# Patient Record
Sex: MALE | Race: WHITE | NOT HISPANIC OR LATINO | Employment: FULL TIME | URBAN - METROPOLITAN AREA
[De-identification: names, ages, dates, MRNs, and addresses within clinical notes are randomized per-mention and may not be internally consistent; named-entity substitution may affect disease eponyms.]

---

## 2017-03-01 ENCOUNTER — OFFICE VISIT (OUTPATIENT)
Dept: OTOLARYNGOLOGY | Facility: CLINIC | Age: 47
End: 2017-03-01

## 2017-03-01 VITALS — WEIGHT: 204 LBS | HEIGHT: 71 IN | BODY MASS INDEX: 28.56 KG/M2

## 2017-03-01 DIAGNOSIS — H65.92 OTITIS MEDIA WITH EFFUSION, LEFT: Primary | ICD-10-CM

## 2017-03-01 RX ORDER — OFLOXACIN 3 MG/ML
5 SOLUTION AURICULAR (OTIC) 2 TIMES DAILY
Qty: 3 ML | Refills: 0 | Status: SHIPPED | OUTPATIENT
Start: 2017-03-01 | End: 2017-03-06

## 2017-03-01 ASSESSMENT — PAIN SCALES - GENERAL: PAINLEVEL: MODERATE PAIN (5)

## 2017-03-01 NOTE — PROGRESS NOTES
Suhail Mustafa is seen for a left ear check.  He says he's been having left face/ear/side of head pain for the last 8+ days.  He's also noticed the tinnitus has worsened in that time.  No obvious changes in hearing.  He still gets good benefit from his hearing aid and does use the alcohol/vinegar drops occasionally when his ear gets smelly from the hearing aid.    Review of systems:  He has had nasal congestion for about a month.  His entire family has been sick.  No fever.    Physical examination:  male in no acute distress.  Alert and answering questions appropriately.  HB 1/6 bilaterally.  Left ear examined under the microscope.  TM intact with the cartilage backing in position and myringosclerosis over the anterior superior portion of the TM.  There likely is an effusion that I can visualize through the anterior inferior TM and the epitympanic retraction pocket appears deeper and I cannot fully see the limits.    Assessment and plan:  Worsened eustachian tube dysfunction likely due to his recent URI.  They are flying to [place] in 2 weeks for spring break.  I recommended a myringotomy and likely PE tube placement secondary to the otitis media with effusion and eustachian tube dysfunction.  The risks and benefits were discussed.  Risks include but are not limited to:  Drainage, tympanic membrane perforation requiring future repair and need for further tube placement.  Consent was obtained.    Procedure:  Time Out was performed with confirmation of the patient, site and procedure.  Left ear examined under the microscope.  Phenol placed on the inferior quadrant.  Myringotomy incision made.  There was noted to be a thick mucoid effusion which was suctioned from the middle ear.  He had immediate improvement in hearing.  There is not much middle ear space and the TM is fairly thin so a Paparella tube was placed.  He tolerated it well with no complications.    I'd like him to use Floxin for 3 days.  He is going to make an  appointment with Audiology for an audiogram and possible hearing aid adjustment depending on the audiogram results.

## 2017-03-01 NOTE — NURSING NOTE
PREPROCEDURE: left myringotomy and PE tube  Yes Patient ID verified with 2 identifiers (name and  or MRN)  Yes: Procedure and site verified with patient/designee  Yes: Accurate consent documentation in medical record  Yes: Marking not required. Reason: Provider is in continuous attendance with the patient from consent through completion of procedure.     TIME OUT:  Yes: Time-Out performed immediately prior to starting procedure, including verbal and active participation of all team members addressing:  * Correct patient identity  * Confirmed that the correct side and site are marked  * An accurate procedure consent form  * Agreement on the procedure to be done  * Correct patient position  * Relevant images and results are properly labeled and appropriately displayed  * The need to administer antibiotics or fluids for irrigation purposes during the procedure as applicable  * Safety precations based on patient history or medication use    DURING PROCEDURE: Verification of correct person, site, and procedure occurs any time the responsibility for care of the patient is transferred to another member of the care team.

## 2017-03-01 NOTE — MR AVS SNAPSHOT
After Visit Summary   3/1/2017    Suhail Mustafa    MRN: [id number]           Patient Information     Date Of Birth          1970        Visit Information        Provider Department      3/1/2017 7:00 AM Yaneth Rogel MD [facility] Ear Nose and Throat        Today's Diagnoses     Otitis media with effusion, left    -  1      Care Instructions    You will need  to schedule a follow up appointment in 2 months for a tube check  Please schedule a hearing test for 1 weeks.  Please  the prescription for ear drops at  Your pharmacy.   Please call our clinic for any questions,concerns,or worsening symptoms.      Clinic #[phone]       Option 3  for the triage nurse          Follow-ups after your visit        Follow-up notes from your care team     Return in about 2 months (around 5/1/2017).      Your next 10 appointments already scheduled     Mar 08, 2017 11:00 AM CST   (Arrive by 10:45 AM)   Hearing Evaluation with Denisse Wang   [facility] ([facility])    [address] [place] [address] [phone]           Please see your medical professional for ear cleaning prior to this appointment if you believe wax buildup may be an issue. All patients are required to have a physician's order stating the medical reason for the hearing test. Your doctor can send an electronic order, use their own form or we have provided a form (called Physician's Order for Audiology Services). It states that there is a medical reason for your exam. Without an order you may need to be rescheduled until the order can be obtained.            May 03, 2017  7:00 AM CDT   (Arrive by 6:45 AM)   Return Visit with Yaneth Rogel MD   [facility] Ear Nose and Throat ([facility])    [address] [place] [address] [phone]              Who to contact     Please call your clinic at [phone]  "to:    Ask questions about your health    Make or cancel appointments    Discuss your medicines    Learn about your test results    Speak to your doctor   If you have compliments or concerns about an experience at your clinic, or if you wish to file a complaint, please contact [facility] Patient Relations at [phone] or email us at [email or web address]         Additional Information About Your Visit        Handmarkhart Information     Get10t gives you secure access to your electronic health record. If you see a primary care provider, you can also send messages to your care team and make appointments. If you have questions, please call your primary care clinic.  If you do not have a primary care provider, please call [phone] and they will assist you.      Fleep is an electronic gateway that provides easy, online access to your medical records. With Fleep, you can request a clinic appointment, read your test results, renew a prescription or communicate with your care team.     To access your existing account, please contact your [facility] or call [phone] for assistance.        Care EveryWhere ID     This is your Care EveryWhere ID. This could be used by other organizations to access your [facility] medical records  [id number]        Your Vitals Were     Height BMI (Body Mass Index)                1.803 m (5' 11\") 28.45 kg/m2           Blood Pressure from Last 3 Encounters:   11/17/16 103/69   11/09/16 116/79   10/31/16 (!) 128/92    Weight from Last 3 Encounters:   03/01/17 92.5 kg (204 lb)   11/17/16 92.5 kg (204 lb)   11/09/16 95.3 kg (210 lb)              We Performed the Following     BINOCULAR MICROSCOPY     TYMPANOSTOMY W LOCAL/TOPICAL ANESTH          Today's Medication Changes          These changes are accurate as of: 3/1/17  9:57 AM.  If you have any questions, ask your nurse or doctor.               Start taking these " medicines.        Dose/Directions    ofloxacin 0.3 % otic solution   Commonly known as:  FLOXIN   Used for:  Otitis media with effusion, left   Started by:  Yaneth Rogel MD        Dose:  5 drop   Place 5 drops Into the left ear 2 times daily for 5 days   Quantity:  3 mL   Refills:  0         These medicines have changed or have updated prescriptions.        Dose/Directions    triamcinolone 0.1 % ointment   Commonly known as:  KENALOG   This may have changed:    - when to take this  - reasons to take this  - additional instructions   Used for:  Other eczema        Apply sparingly to affected area three times a day   Quantity:  80 g   Refills:  2            Where to get your medicines      These medications were sent to [place] [id number] [facility] - [facility] [address] [facility]  [address] [facility] [address]     Phone:  [phone]     ofloxacin 0.3 % otic solution                Primary Care Provider Office Phone # Fax #    Bryan Purvis [phone]       [address] [address]        Thank you!     Thank you for choosing [facility] EAR NOSE AND THROAT  for your care. Our goal is always to provide you with excellent care. Hearing back from our patients is one way we can continue to improve our services. Please take a few minutes to complete the written survey that you may receive in the mail after your visit with us. Thank you!             Your Updated Medication List - Protect others around you: Learn how to safely use, store and throw away your medicines at www.disposemymeds.org.          This list is accurate as of: 3/1/17  9:57 AM.  Always use your most recent med list.                   Brand Name Dispense Instructions for use    acetaminophen 325 MG tablet    TYLENOL    100 tablet    Take 2 tablets (650 mg) by mouth every 6 hours as needed for mild pain       IRON SUPPLEMENT PO      Take 325 mg by mouth daily       ofloxacin 0.3 %  otic solution    FLOXIN    3 mL    Place 5 drops Into the left ear 2 times daily for 5 days       senna-docusate 8.6-50 MG per tablet    SENOKOT-S;PERICOLACE    50 tablet    Take 2 tablets by mouth 2 times daily as needed for constipation       triamcinolone 0.1 % ointment    KENALOG    80 g    Apply sparingly to affected area three times a day       UNABLE TO FIND      MEDICATION NAME: chamois creme       VITAMIN D (CHOLECALCIFEROL) PO      Take 1,000 Units by mouth daily

## 2017-03-01 NOTE — PATIENT INSTRUCTIONS
You will need  to schedule a follow up appointment in 2 months for a tube check  Please schedule a hearing test for 1 weeks.  Please  the prescription for ear drops at  Your pharmacy.   Please call our clinic for any questions,concerns,or worsening symptoms.      Clinic #[phone]       Option 3  for the triage nurse

## 2017-03-01 NOTE — NURSING NOTE
Chief Complaint   Patient presents with     RECHECK     6 month f/u     Nenita Barnes Medical Assistant

## 2017-03-01 NOTE — LETTER
3/1/2017      RE: Suhail Mustafa  [address]   [place] [place] [facility] [place] [address]       Suhail Mustafa is seen for a left ear check.  He says he's been having left face/ear/side of head pain for the last 8+ days.  He's also noticed the tinnitus has worsened in that time.  No obvious changes in hearing.  He still gets good benefit from his hearing aid and does use the alcohol/vinegar drops occasionally when his ear gets smelly from the hearing aid.    Review of systems:  He has had nasal congestion for about a month.  His entire family has been sick.  No fever.    Physical examination:  male in no acute distress.  Alert and answering questions appropriately.  HB 1/6 bilaterally.  Left ear examined under the microscope.  TM intact with the cartilage backing in position and myringosclerosis over the anterior superior portion of the TM.  There likely is an effusion that I can visualize through the anterior inferior TM and the epitympanic retraction pocket appears deeper and I cannot fully see the limits.    Assessment and plan:  Worsened eustachian tube dysfunction likely due to his recent URI.  They are flying to [place] in 2 weeks for spring break.  I recommended a myringotomy and likely PE tube placement secondary to the otitis media with effusion and eustachian tube dysfunction.  The risks and benefits were discussed.  Risks include but are not limited to:  Drainage, tympanic membrane perforation requiring future repair and need for further tube placement.  Consent was obtained.    Procedure:  Time Out was performed with confirmation of the patient, site and procedure.  Left ear examined under the microscope.  Phenol placed on the inferior quadrant.  Myringotomy incision made.  There was noted to be a thick mucoid effusion which was suctioned from the middle ear.  He had immediate improvement in hearing.  There is not much middle ear space and the TM is fairly thin so a Paparella tube was placed.  He tolerated it well with  no complications.    I'd like him to use Floxin for 3 days.  He is going to make an appointment with Audiology for an audiogram and possible hearing aid adjustment depending on the audiogram results.  Yaneth Rogel MD

## 2017-03-08 ENCOUNTER — OFFICE VISIT (OUTPATIENT)
Dept: AUDIOLOGY | Facility: CLINIC | Age: 47
End: 2017-03-08

## 2017-03-08 DIAGNOSIS — H93.12 TINNITUS, LEFT: ICD-10-CM

## 2017-03-08 DIAGNOSIS — H69.92 EUSTACHIAN TUBE DYSFUNCTION, LEFT: ICD-10-CM

## 2017-03-08 DIAGNOSIS — H90.72 MIXED HEARING LOSS OF LEFT EAR: Primary | ICD-10-CM

## 2017-03-08 NOTE — Clinical Note
Kamila Rogel,  This was a patient for which you recently placed a left PE tube.    He reported that things were going well, the left tymp ear canal volume seemed to be smaller than expected (only 0.3 larger than the right ear).  The ear has been draining and there was minor drainage noted in the ear canal.  We would usually refer back to ENT for the potential blocked tube but given that he just saw you, the ear is draining and the previous pain and aural fullness has gone away, I let him know I would send you a message and that your clinic would contact him if he needed to do anything.  Thanks! Amanda Connolly

## 2017-03-08 NOTE — MR AVS SNAPSHOT
After Visit Summary   3/8/2017    Suhail Mustafa    MRN: [id number]           Patient Information     Date Of Birth          1970        Visit Information        Provider Department      3/8/2017 11:00 AM Amanda Connolly AuD [facility] Audiology        Today's Diagnoses     Mixed hearing loss of left ear    -  1    Eustachian tube dysfunction, left        Tinnitus, left           Follow-ups after your visit        Your next 10 appointments already scheduled     May 03, 2017  7:00 AM CDT   (Arrive by 6:45 AM)   Return Visit with Yaneth Rogel MD   [facility] Ear Nose and Throat ([facility] [place] [facility])    [address]  [address] [facility] [address] [phone]              Who to contact     Please call your clinic at [phone] to:    Ask questions about your health    Make or cancel appointments    Discuss your medicines    Learn about your test results    Speak to your doctor   If you have compliments or concerns about an experience at your clinic, or if you wish to file a complaint, please contact [facility] Patient Relations at [phone] or email us at [email or web address]         Additional Information About Your Visit        MyChart Information     Twoodot gives you secure access to your electronic health record. If you see a primary care provider, you can also send messages to your care team and make appointments. If you have questions, please call your primary care clinic.  If you do not have a primary care provider, please call [phone] and they will assist you.      Happlink is an electronic gateway that provides easy, online access to your medical records. With Happlink, you can request a clinic appointment, read your test results, renew a prescription or communicate with your care team.     To access your existing account, please contact your [facility] or call [phone]  for assistance.        Care EveryWhere ID     This is your Care EveryWhere ID. This could be used by other organizations to access your [place] medical records  [id number]         Blood Pressure from Last 3 Encounters:   11/17/16 103/69   11/09/16 116/79   10/31/16 (!) 128/92    Weight from Last 3 Encounters:   03/01/17 92.5 kg (204 lb)   11/17/16 92.5 kg (204 lb)   11/09/16 95.3 kg (210 lb)              We Performed the Following     AUDIOGRAM/TYMPANOGRAM - INTERFACE     [facility] Audiometry Thrshld Eval & Speech Recog ([address])     No Charge, Hearing Aid Clinic Visit     Tympanometry (impedance - testing) ([address])          Today's Medication Changes          These changes are accurate as of: 3/8/17  4:12 PM.  If you have any questions, ask your nurse or doctor.               These medicines have changed or have updated prescriptions.        Dose/Directions    triamcinolone 0.1 % ointment   Commonly known as:  KENALOG   This may have changed:    - when to take this  - reasons to take this  - additional instructions   Used for:  Other eczema        Apply sparingly to affected area three times a day   Quantity:  80 g   Refills:  2                Primary Care Provider Office Phone # Fax #    Bryan Purvis [phone]       [address] [address]        Thank you!     Thank you for choosing [facility] AUDIOLOGY  for your care. Our goal is always to provide you with excellent care. Hearing back from our patients is one way we can continue to improve our services. Please take a few minutes to complete the written survey that you may receive in the mail after your visit with us. Thank you!             Your Updated Medication List - Protect others around you: Learn how to safely use, store and throw away your medicines at www.disposemymeds.org.          This list is accurate as of: 3/8/17  4:12 PM.  Always use your most recent med list.                   Brand  Name Dispense Instructions for use    acetaminophen 325 MG tablet    TYLENOL    100 tablet    Take 2 tablets (650 mg) by mouth every 6 hours as needed for mild pain       IRON SUPPLEMENT PO      Take 325 mg by mouth daily       senna-docusate 8.6-50 MG per tablet    SENOKOT-S;PERICOLACE    50 tablet    Take 2 tablets by mouth 2 times daily as needed for constipation       triamcinolone 0.1 % ointment    KENALOG    80 g    Apply sparingly to affected area three times a day       UNABLE TO FIND      MEDICATION NAME: chamois creme       VITAMIN D (CHOLECALCIFEROL) PO      Take 1,000 Units by mouth daily

## 2017-03-08 NOTE — PROGRESS NOTES
AUDIOLOGY REPORT    SUBJECTIVE:  Suhail Mustafa is a 46 year old male who was seen in the Audiology Clinic at the [facility] [place]  for audiologic evaluation, referred by Yaneth Rogel.  The patient has been seen previously in this clinic on 6/2/2016 for assessment and results indicated normal hearing in the right ear and a severe rising to an essentially moderate mixed hearing loss in the left ear.  He has a history of left ear surgeries including a tympanoplasty and PORP with reconstruction in Oct 2015.  He wears a left Phonak Bolero hearing aid which he finds beneficial. The patient reports that he was recently seen by Dr. Rogel for the placement of a left PE tube and that hearing improved following PE tube placement.  He is here today to see if hearing changed and to have his hearing aid adjusted as he reports it is too loud since PE tube placement.  He reports left ear drainage and tinnitus. The patient denies dizziness, bilateral otalgia, and bilateral aural fullness.      OBJECTIVE:  Otoscopic exam indicates clear ear canal right and PE tube visualized with minor drainage left ear.     Pure Tone Thresholds assessed using conventional audiometry with good reliability from 250-8000 Hz bilaterally using insert earphones and circumaural headphones     RIGHT:  Essentially normal hearing with a mild sensorineural hearing loss at 6 kHz (stable re: 6/2/16)    LEFT:    Moderately-severe rising to essentially moderate mixed hearing loss (improvements noted at 250 Hz and 1 kHz re: 6/2/16).    Tympanogram:    RIGHT: Hypermobile    LEFT:   Flat,  Ear canal volume is larger than right ear but smaller than expected for patent PE tube    Reflexes (reported by stimulus ear):  Did not test due to right hypermobility and left PE tube    Speech Reception Threshold:    RIGHT: 15 dB HL    LEFT:   45 dB HL    Word Recognition Score:     RIGHT: 100% at 60 dB HL using NU-6 recorded word list.    LEFT:   100% at 85  dB HL using NU-6 recorded word list.      The left hearing aid was connected to the computer and real-ear measurements were made off of the new hearing evaluation. Gain for mid-range frequencies was significantly decreased and high frequency gain was slightly increased to better match new targets. Suhail was happy with the sound quality and overall volume of the hearing aid following adjustments.  Cerumen was noted in the tube and tone hook so a new tube and tone-hook were placed. A listening check revealed that the hearing aid was sounding crisp and clear with no distortion or weakness noted.     ASSESSMENT:   Compared to patient's previous audiogram dated 6/2/2016, hearing has remained stable in the right ear and improved at 250 and 1000 Hz in the left ear. Today s results were discussed with the patient in detail.     Adjustments were made to the hearing aid as noted above.    PLAN:  It is recommended that the patient have his hearing evaluated as medically indicated or sooner if concerns arise.  He will return for hearing aid checks and programming as needed, it was discussed that hearing aids should be checked 1-2 times per year to ensure they are working appropriately.  Please call this clinic with questions regarding these results or recommendations.        Amanda Connolly, Denisse  Audiologist  [place] License  #[id number]        CC: Dr. Yaneth Rogel

## 2017-03-09 ENCOUNTER — CARE COORDINATION (OUTPATIENT)
Dept: OTOLARYNGOLOGY | Facility: CLINIC | Age: 47
End: 2017-03-09

## 2017-03-09 DIAGNOSIS — H92.10 OTORRHEA: Primary | ICD-10-CM

## 2017-03-09 RX ORDER — HYDROCORTISONE AND ACETIC ACID 20.75; 10.375 MG/ML; MG/ML
SOLUTION AURICULAR (OTIC)
Qty: 4 ML | Refills: 1 | Status: SHIPPED | OUTPATIENT
Start: 2017-03-09 | End: 2017-04-06

## 2017-03-09 NOTE — PROGRESS NOTES
Rosalia Crowell.  Would you call Suhail and ask him how much his ear seems to be draining?  If it's quite a bit, I should see him back and get a culture.  If it's a little, then start VosolHC for a week.  Thanks.     Yaneth     3-9-17 spoke with pt regarding above message- pt states had a lot of drainage 10 days ago now better.  Rx sent to pharmacy- pt notified and understood.----Rosalia Carlson RN  ENT Care Coordinator   Otology  [phone]

## 2017-03-31 ENCOUNTER — TELEPHONE (OUTPATIENT)
Dept: FAMILY MEDICINE | Facility: CLINIC | Age: 47
End: 2017-03-31

## 2017-03-31 NOTE — TELEPHONE ENCOUNTER
Patient is scheduled for 4/6/17 through [employer] for blood in stool. Please triage.  Gaby Segura CMA

## 2017-03-31 NOTE — TELEPHONE ENCOUNTER
Attempted to call patient at home number, left message on answering service requesting call back to clinic to discuss.  I also tried the mobile number listed as well, no answer, left message requesting call back to RN line on this line as well.  Leila David, RN  [facility]

## 2017-03-31 NOTE — TELEPHONE ENCOUNTER
Suhail Mustafa (Self) [phone] (W)         Patient called back.  Please call at the above number.    Melanie Pena RN [facility] Triage.

## 2017-03-31 NOTE — TELEPHONE ENCOUNTER
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  It seemed to resolve but is noticing blood in his stool when he has BM, no large clots, denies abdominal pain, dizziness, fainting.   Does not fill toilet or bleed without stooling.   States it began happening again a month ago and he wonders what else he can do.  Advised him to keep appointment, seek eval in ER if having larger amounts of blood, abdominal pain, or dizziness.  Patient verbalized understanding of and agreement with plan.  Source:  Telephone Triage Protocols for Nurses, Steven, 5th ed, stools, abnormal  Leila David, CAROLYNN  [facility]

## 2017-04-06 ENCOUNTER — OFFICE VISIT (OUTPATIENT)
Dept: FAMILY MEDICINE | Facility: CLINIC | Age: 47
End: 2017-04-06
Payer: COMMERCIAL

## 2017-04-06 VITALS
HEART RATE: 80 BPM | DIASTOLIC BLOOD PRESSURE: 74 MMHG | SYSTOLIC BLOOD PRESSURE: 120 MMHG | TEMPERATURE: 97.5 F | OXYGEN SATURATION: 95 % | WEIGHT: 210 LBS | BODY MASS INDEX: 29.29 KG/M2

## 2017-04-06 DIAGNOSIS — H90.12 CONDUCTIVE HEARING LOSS IN LEFT EAR: ICD-10-CM

## 2017-04-06 DIAGNOSIS — K62.5 RECTAL BLEEDING: Primary | ICD-10-CM

## 2017-04-06 DIAGNOSIS — K64.8 INTERNAL HEMORRHOIDS: ICD-10-CM

## 2017-04-06 DIAGNOSIS — Z52.4 DONOR OF KIDNEY FOR TRANSPLANT: ICD-10-CM

## 2017-04-06 PROCEDURE — 99213 OFFICE O/P EST LOW 20 MIN: CPT | Mod: 25 | Performed by: FAMILY MEDICINE

## 2017-04-06 PROCEDURE — 46600 DIAGNOSTIC ANOSCOPY SPX: CPT | Performed by: FAMILY MEDICINE

## 2017-04-06 NOTE — MR AVS SNAPSHOT
After Visit Summary   4/6/2017    Suhail Mustafa    MRN: [id number]           Patient Information     Date Of Birth          1970        Visit Information        Provider Department      4/6/2017 3:40 PM To Whitt MD [facility]        Today's Diagnoses     Rectal bleeding    -  1    Internal hemorrhoids        Donor of kidney for transplant        Conductive hearing loss in left ear           Follow-ups after your visit        Follow-up notes from your care team     Return if symptoms worsen or fail to improve.      Your next 10 appointments already scheduled     May 03, 2017  7:00 AM CDT   (Arrive by 6:45 AM)   Return Visit with Yaneth Rogel MD   [facility] ([facility])    [address]  [address] [facility] [address] [phone]              Who to contact     If you have questions or need follow up information about today's clinic visit or your schedule please contact [facility] directly at [phone].  Normal or non-critical lab and imaging results will be communicated to you by Medivancehart, letter or phone within 4 business days after the clinic has received the results. If you do not hear from us within 7 days, please contact the clinic through Medivancehart or phone. If you have a critical or abnormal lab result, we will notify you by phone as soon as possible.  Submit refill requests through Yolto or call your pharmacy and they will forward the refill request to us. Please allow 3 business days for your refill to be completed.          Additional Information About Your Visit        MyChart Information     Yolto gives you secure access to your electronic health record. If you see a primary care provider, you can also send messages to your care team and make appointments. If you have questions, please call your primary care clinic.  If you do not have a primary care provider, please  call [phone] and they will assist you.        Care EveryWhere ID     This is your Care EveryWhere ID. This could be used by other organizations to access your [place] medical records  [id number]        Your Vitals Were     Pulse Temperature Pulse Oximetry BMI (Body Mass Index)          80 97.5  F (36.4  C) (Oral) 95% 29.29 kg/m2         Blood Pressure from Last 3 Encounters:   04/06/17 120/74   11/17/16 103/69   11/09/16 116/79    Weight from Last 3 Encounters:   04/06/17 210 lb (95.3 kg)   03/01/17 204 lb (92.5 kg)   11/17/16 204 lb (92.5 kg)              Today, you had the following     No orders found for display         Today's Medication Changes          These changes are accurate as of: 4/6/17  4:09 PM.  If you have any questions, ask your nurse or doctor.               These medicines have changed or have updated prescriptions.        Dose/Directions    triamcinolone 0.1 % ointment   Commonly known as:  KENALOG   This may have changed:    - when to take this  - reasons to take this  - additional instructions   Used for:  Other eczema        Apply sparingly to affected area three times a day   Quantity:  80 g   Refills:  2                Primary Care Provider Office Phone # Fax #    Bryan Purvis [phone]       [address] [address]        Thank you!     Thank you for choosing [facility]  for your care. Our goal is always to provide you with excellent care. Hearing back from our patients is one way we can continue to improve our services. Please take a few minutes to complete the written survey that you may receive in the mail after your visit with us. Thank you!             Your Updated Medication List - Protect others around you: Learn how to safely use, store and throw away your medicines at www.disposemymeds.org.          This list is accurate as of: 4/6/17  4:09 PM.  Always use your most recent med  list.                   Brand Name Dispense Instructions for use    acetaminophen 325 MG tablet    TYLENOL    100 tablet    Take 2 tablets (650 mg) by mouth every 6 hours as needed for mild pain       IRON SUPPLEMENT PO      Take 325 mg by mouth daily       triamcinolone 0.1 % ointment    KENALOG    80 g    Apply sparingly to affected area three times a day       UNABLE TO FIND      MEDICATION NAME: harriett creme       VITAMIN D (CHOLECALCIFEROL) PO      Take 1,000 Units by mouth daily

## 2017-04-06 NOTE — PROGRESS NOTES
SUBJECTIVE:                                                    Suhail Mustafa is a 46 year old male who presents to clinic today for the following health issues:      New Patient/Transfer of Care. He had been seeing Dr. Purvis, but Dr. Purvis is retired now.    Concern - Blood in the stool     Onset: 4-5 months    Description:   Blood in the stool, has been using cream for hemorrhoids but none on the outside. Possibly still some on the inside.    Intensity: moderate, severe    Progression of Symptoms:  improving and worsening    Accompanying Signs & Symptoms:  Blood in stool, discomfort       Previous history of similar problem:   A year ago    Precipitating factors:   Worsened by: None    Alleviating factors:  Improved by: better diet, cream on the outside       Therapies Tried and outcome: None      He previously has had external hemorrhoids, but he hasn't noticed any lumps or growths on the outside of his rectum lately. The bleeding is on the outside of his stool and on the tissue. No significant pain with the passage of bowel movements.  He does have a tendency towards constipation. He sits around a lot during his workday. He also tends to sit for a prolonged period when having a bowel movement.  No family history of colon cancer.    He did donate a kidney about 6 months ago to his brother. He's had some follow-up lab work related to that and then dissipates having lab work done soon for that. His creatinine was 1.55 when checked this last fall. His brother's creatinine came down to about 2.1 after the transplant.    Problem list and histories reviewed & adjusted, as indicated.  Additional history: as documented    Patient Active Problem List   Diagnosis     CARDIOVASCULAR SCREENING; LDL GOAL LESS THAN 160     FH: kidney cancer     Conductive hearing loss in left ear     Donor of kidney for transplant     Lichen simplex chronicus     Other eczema     ACP (advance care planning)     Past Surgical History:    Procedure Laterality Date     ENT SURGERY  multiple times    pe tubes     ENT SURGERY      tonsils     ENT SURGERY  11/2011    Tubes 5 times since last time 10/21/14     GENITOURINARY SURGERY  Oct. 2013    Vasectomy     LAPAROSCOPIC DONOR HAND ASSISTED KIDNEY LIVING RELATED N/A 10/13/2016    Procedure: LAPAROSCOPIC DONOR HAND ASSISTED KIDNEY LIVING RELATED;  Surgeon: Tigre Quarles MD;  Location: [facility] OR     LASER DIODE TYMPANOMASTOIDECTOMY Left 3/31/2015    Procedure: LASER DIODE TYMPANOMASTOIDECTOMY;  Surgeon: Yaneth Rogel MD;  Location: [facility] OR     ORTHOPEDIC SURGERY  child    dislocated hip     OSSICULAR CHAIN RECONSTRUCTION N/A 10/9/2015    Procedure: OSSICULAR CHAIN RECONSTRUCTION;  Surgeon: Yaneth Rogel MD;  Location: [facility] OR     TRANSPLANT       TYMPANOPLASTY Left 10/9/2015    Procedure: TYMPANOPLASTY;  Surgeon: Yaneth Rogel MD;  Location:  OR       Social History   Substance Use Topics     Smoking status: Never Smoker     Smokeless tobacco: Never Used     Alcohol use 0.6 oz/week     1 Standard drinks or equivalent per week      Comment: once per week with a meal     Family History   Problem Relation Age of Onset     GASTROINTESTINAL DISEASE Mother      colostomy for diverticulitis     CEREBROVASCULAR DISEASE Mother      after giving birth     KIDNEY DISEASE Brother      dysplasia     Genitourinary Problems Brother      Cardiovascular Brother      ablation     HEART DISEASE Father      Genitourinary Problems Father      Type 2 Diabetes Father      obese     CANCER Paternal Grandfather      bone cancer     Aneurysm Paternal Uncle      brain aneurysm on coumadin     Hypertension Brother          Current Outpatient Prescriptions   Medication Sig Dispense Refill     UNABLE TO FIND MEDICATION NAME: chamois creme       acetaminophen (TYLENOL) 325 MG tablet Take 2 tablets (650 mg) by mouth every 6 hours as needed for mild pain 100 tablet 0     triamcinolone (KENALOG) 0.1 % ointment Apply sparingly to  affected area three times a day (Patient taking differently: 3 times daily as needed Apply sparingly to affected area three times a day) 80 g 2     VITAMIN D, CHOLECALCIFEROL, PO Take 1,000 Units by mouth daily        Ferrous Sulfate (IRON SUPPLEMENT PO) Take 325 mg by mouth daily       No Known Allergies    Reviewed and updated as needed this visit by clinical staff  Tobacco  Allergies  Meds  Med Hx  Surg Hx  Fam Hx  Soc Hx      Reviewed and updated as needed this visit by Provider         ROS:  He's had some left shoulder discomfort, but he is doing exercises for that now and it seems to be getting better.  He also describes a history of ongoing left ear problems. He has a tube in his left eardrum now and does use a hearing aid.    OBJECTIVE:                                                    /74 (BP Location: Right arm, Patient Position: Chair, Cuff Size: Adult Regular)  Pulse 80  Temp 97.5  F (36.4  C) (Oral)  Wt 210 lb (95.3 kg)  SpO2 95%  BMI 29.29 kg/m2  Body mass index is 29.29 kg/(m^2).  GENERAL: healthy, alert and no distress  RECTAL (male): No external hemorrhoids or fissures are seen. Anoscopy was performed and he does have internal hemorrhoids present, especially at the 9:00 position. No active bleeding seen.    Diagnostic Test Results:  Previous lab results and office notes were reviewed in his chart      ASSESSMENT/PLAN:                                                        ICD-10-CM    1. Rectal bleeding K62.5 ANOSCOPY W/WO BRUSH/WASH   2. Internal hemorrhoids K64.8 ANOSCOPY W/WO BRUSH/WASH   3. Donor of kidney for transplant Z52.4    4. Conductive hearing loss in left ear H90.12      His rectal bleeding appears secondary to internal hemorrhoids  I reviewed conservative treatment for that including increased fiber and fluids to keep the bowel movements soft and regular and easily passed  I advised him to avoid prolonged sitting, especially when having bowel movements  We will follow  this expectantly  If symptoms persist over the next month or 2, then I discussed possibly proceeding with a colonoscopy as he will be due for one next year in August after turning 48 anyway  He'll continue with routine follow-up for his history of kidney donor status and ear problems    If new, worsening or persistent symptoms, the patient is to call or return for a recheck.      To Whitt MD  [facility]

## 2017-04-06 NOTE — NURSING NOTE
"Chief Complaint   Patient presents with     Establish Care     Rectal Problem     blood in stool       Initial /74 (BP Location: Right arm, Patient Position: Chair, Cuff Size: Adult Regular)  Pulse 80  Temp 97.5  F (36.4  C) (Oral)  Wt 210 lb (95.3 kg)  SpO2 95%  BMI 29.29 kg/m2 Estimated body mass index is 29.29 kg/(m^2) as calculated from the following:    Height as of 3/1/17: 5' 11\" (1.803 m).    Weight as of this encounter: 210 lb (95.3 kg).  Medication Reconciliation: complete   Shira See DENICE Vazquez      "

## 2017-04-19 ENCOUNTER — OFFICE VISIT (OUTPATIENT)
Dept: OTOLARYNGOLOGY | Facility: CLINIC | Age: 47
End: 2017-04-19

## 2017-04-19 VITALS — HEIGHT: 71 IN | WEIGHT: 210 LBS | BODY MASS INDEX: 29.4 KG/M2

## 2017-04-19 DIAGNOSIS — H69.92 EUSTACHIAN TUBE DISORDER, LEFT: Primary | ICD-10-CM

## 2017-04-19 RX ORDER — ACETIC ACID 20.65 MG/ML
SOLUTION AURICULAR (OTIC)
Qty: 10 ML | Refills: 11 | Status: SHIPPED | OUTPATIENT
Start: 2017-04-19 | End: 2023-07-17

## 2017-04-19 ASSESSMENT — PAIN SCALES - GENERAL: PAINLEVEL: NO PAIN (0)

## 2017-04-19 NOTE — MR AVS SNAPSHOT
After Visit Summary   4/19/2017    Suhail Mustafa    MRN: [id number]           Patient Information     Date Of Birth          1970        Visit Information        Provider Department      4/19/2017 7:00 AM Yaneth Rogel MD [facility]        Today's Diagnoses     Eustachian tube disorder, left    -  1      Care Instructions    You will need  to schedule a follow up appointment in 6 months.  Please  the prescription for Vosol ear drops, use every day to every other day.   Please call our clinic for any questions,concerns,or worsening symptoms.      Clinic #[phone]       Option 3  for the triage nurse.        Follow-ups after your visit        Follow-up notes from your care team     Return in about 6 months (around 10/19/2017).      Your next 10 appointments already scheduled     Oct 18, 2017  7:00 AM CDT   (Arrive by 6:45 AM)   Return Visit with MD DHIRAJ Larry [facility] ([facility])    [address] [place] [address] [phone]              Who to contact     Please call your clinic at [phone] to:    Ask questions about your health    Make or cancel appointments    Discuss your medicines    Learn about your test results    Speak to your doctor   If you have compliments or concerns about an experience at your clinic, or if you wish to file a complaint, please contact [facility] Patient Relations at [phone] or email us at [email or web address]         Additional Information About Your Visit        MyChart Information     OndaViat gives you secure access to your electronic health record. If you see a primary care provider, you can also send messages to your care team and make appointments. If you have questions, please call your primary care clinic.  If you do not have a primary care provider, please call [phone] and they will assist you.       "ATG Access is an electronic gateway that provides easy, online access to your medical records. With ATG Access, you can request a clinic appointment, read your test results, renew a prescription or communicate with your care team.     To access your existing account, please contact your [facility] or call [phone] for assistance.        Care EveryWhere ID     This is your Care EveryWhere ID. This could be used by other organizations to access your [place] medical records  [id number]        Your Vitals Were     Height BMI (Body Mass Index)                1.803 m (5' 10.98\") 29.3 kg/m2           Blood Pressure from Last 3 Encounters:   04/06/17 120/74   11/17/16 103/69   11/09/16 116/79    Weight from Last 3 Encounters:   04/19/17 95.3 kg (210 lb)   04/06/17 95.3 kg (210 lb)   03/01/17 92.5 kg (204 lb)              We Performed the Following     BINOCULAR MICROSCOPY          Today's Medication Changes          These changes are accurate as of: 4/19/17  7:22 AM.  If you have any questions, ask your nurse or doctor.               Start taking these medicines.        Dose/Directions    acetic acid 2 % otic solution   Commonly known as:  VOSOL   Used for:  Eustachian tube disorder, left   Started by:  Yaneth Rogel MD        4 drops to the left ear- daily or every other day   Quantity:  10 mL   Refills:  11         These medicines have changed or have updated prescriptions.        Dose/Directions    triamcinolone 0.1 % ointment   Commonly known as:  KENALOG   This may have changed:    - when to take this  - reasons to take this  - additional instructions   Used for:  Other eczema        Apply sparingly to affected area three times a day   Quantity:  80 g   Refills:  2            Where to get your medicines      These medications were sent to [place] [address] [facility] - [place], [place] - [address] [facility]  [address] [address]     Phone:  [phone]     acetic acid 2 % otic " solution                Primary Care Provider Office Phone # Fax #    To Whitt -[phone]       [facility] [address] [facility] [place] [address]        Thank you!     Thank you for choosing [facility]  for your care. Our goal is always to provide you with excellent care. Hearing back from our patients is one way we can continue to improve our services. Please take a few minutes to complete the written survey that you may receive in the mail after your visit with us. Thank you!             Your Updated Medication List - Protect others around you: Learn how to safely use, store and throw away your medicines at www.disposemymeds.org.          This list is accurate as of: 4/19/17  7:22 AM.  Always use your most recent med list.                   Brand Name Dispense Instructions for use    acetaminophen 325 MG tablet    TYLENOL    100 tablet    Take 2 tablets (650 mg) by mouth every 6 hours as needed for mild pain       acetic acid 2 % otic solution    VOSOL    10 mL    4 drops to the left ear- daily or every other day       IRON SUPPLEMENT PO      Take 325 mg by mouth daily       triamcinolone 0.1 % ointment    KENALOG    80 g    Apply sparingly to affected area three times a day       UNABLE TO FIND      MEDICATION NAME: chamois creme       VITAMIN D (CHOLECALCIFEROL) PO      Take 1,000 Units by mouth daily

## 2017-04-19 NOTE — PATIENT INSTRUCTIONS
You will need  to schedule a follow up appointment in 6 months.  Please  the prescription for Vosol ear drops, use every day to every other day.   Please call our clinic for any questions,concerns,or worsening symptoms.      Clinic #[phone]       Option 3  for the triage nurse.

## 2017-04-19 NOTE — LETTER
4/19/2017      RE: Suhail Hookerstrom  [address] [place] [address]       Suhail Mustafa is seen for a left PE tube check.  He says his ear is better and the PE tube made flying easy.  The ear was dry for a while but he thinks it was blocked as his ear was full again.  He started the Floxin again and now the ear is moist but not actively draining.  The tube is definitely open as he can taste the drops.  No pain.  Wearing his hearing aid with no difficulty.  He says that he actually had a little trouble equalizing the right ear on the plane.  No hearing changes or fullness on the right.    Physical examination:  male in no acute distress.  Alert and answering questions appropriately.  HB 1/6 bilaterally.  Both ears examined under the microscope.  Right ear canal clear, TM intact and somewhat atrophic and slightly retracted throughout but no pockets noted, no effusion.  Left ear canal moist without otorrhea, PE tube in position in the inferior quadrant and open, TM thick which is unchanged.  The ear was suctioned and no fluid came out of the tube.    Assessment and plan:  Open PE tube.  We discussed that his ear may be slightly moist at baseline as he does wear a hearing aid on the left and he may be draining a little bit of effusion out of the PE tube.  I'd like him to change to Vosol drops once or even every other day to try to prevent infections and to help keep the tube open.  We'll see him again in 6 months.      Yaneth Rogel MD

## 2017-04-19 NOTE — NURSING NOTE
Chief Complaint   Patient presents with     RECHECK     ear check     Nenita Barnes Medical Assistant

## 2017-04-19 NOTE — PROGRESS NOTES
Suhail VIVIANA HookerRamsey is seen for a left PE tube check.  He says his ear is better and the PE tube made flying easy.  The ear was dry for a while but he thinks it was blocked as his ear was full again.  He started the Floxin again and now the ear is moist but not actively draining.  The tube is definitely open as he can taste the drops.  No pain.  Wearing his hearing aid with no difficulty.  He says that he actually had a little trouble equalizing the right ear on the plane.  No hearing changes or fullness on the right.    Physical examination:  male in no acute distress.  Alert and answering questions appropriately.  HB 1/6 bilaterally.  Both ears examined under the microscope.  Right ear canal clear, TM intact and somewhat atrophic and slightly retracted throughout but no pockets noted, no effusion.  Left ear canal moist without otorrhea, PE tube in position in the inferior quadrant and open, TM thick which is unchanged.  The ear was suctioned and no fluid came out of the tube.    Assessment and plan:  Open PE tube.  We discussed that his ear may be slightly moist at baseline as he does wear a hearing aid on the left and he may be draining a little bit of effusion out of the PE tube.  I'd like him to change to Vosol drops once or even every other day to try to prevent infections and to help keep the tube open.  We'll see him again in 6 months.

## 2017-05-11 ENCOUNTER — OFFICE VISIT (OUTPATIENT)
Dept: AUDIOLOGY | Facility: CLINIC | Age: 47
End: 2017-05-11

## 2017-05-11 DIAGNOSIS — Z52.4 KIDNEY DONOR: ICD-10-CM

## 2017-05-11 DIAGNOSIS — H90.12 CONDUCTIVE HEARING LOSS IN LEFT EAR: Primary | ICD-10-CM

## 2017-05-11 DIAGNOSIS — Z52.4 KIDNEY DONOR: Primary | ICD-10-CM

## 2017-05-11 LAB
CREAT SERPL-MCNC: 1.4 MG/DL (ref 0.66–1.25)
CREAT UR-MCNC: 49 MG/DL
GFR SERPL CREATININE-BSD FRML MDRD: 54 ML/MIN/1.7M2
MICROALBUMIN UR-MCNC: <5 MG/L
MICROALBUMIN/CREAT UR: NORMAL MG/G CR (ref 0–17)
PROT UR-MCNC: <0.05 G/L
PROT/CREAT 24H UR: NORMAL G/G CR (ref 0–0.2)

## 2017-05-11 NOTE — PROGRESS NOTES
AUDIOLOGY REPORT    BACKGROUND INFORMATION: Suhail Mustafa was seen in Audiology at the [facility] on 5/11/2017 for follow-up.  The patient has been seen previously in this clinic on 01/7/2016 for fitting of Phonak Bolero V50-SP behind-the-ear hearing aid. Previous results have revealed normal hearing sensitivity at the right ear and a severe rising to mild mixed hearing loss at the left ear.The patient reports that following his left PE tube his hearing has improved and the hearing aid therefore is too loud.    TEST RESULTS AND PROCEDURES: Overall gain was decreased by 4 dB. I also extended the volume control range so he is able to decrease more if needed.    SUMMARY AND RECOMMENDATIONS: A hearing aid check was performed today and the patient reports he will try the new settings.  Adjustments were made as noted above and the patient will return for follow-up in4-6 months, sooner if needed.  Call this clinic with questions regarding today s visit.      Osiel De Jesus  Licensed Audiologist  [place] License #[id number]

## 2017-05-11 NOTE — MR AVS SNAPSHOT
After Visit Summary   5/11/2017    Suhail Mustafa    MRN: [id number]           Patient Information     Date Of Birth          1970        Visit Information        Provider Department      5/11/2017 8:00 AM Amanda Wright AuD M [facility]        Today's Diagnoses     Conductive hearing loss in left ear    -  1       Follow-ups after your visit        Your next 10 appointments already scheduled     Oct 18, 2017  7:00 AM CDT   (Arrive by 6:45 AM)   Return Visit with MD DHIRAJ Larry [facility] Nose and Throat [facility])    [address] [place] [address] [phone]              Who to contact     Please call your clinic at [phone] to:    Ask questions about your health    Make or cancel appointments    Discuss your medicines    Learn about your test results    Speak to your doctor   If you have compliments or concerns about an experience at your clinic, or if you wish to file a complaint, please contact [facility] Patient Relations at [phone] or email us at [email or web address]         Additional Information About Your Visit        MyChart Information     SaltStackt gives you secure access to your electronic health record. If you see a primary care provider, you can also send messages to your care team and make appointments. If you have questions, please call your primary care clinic.  If you do not have a primary care provider, please call [phone] and they will assist you.      SaltStackt is an electronic gateway that provides easy, online access to your medical records. With Flythegap, you can request a clinic appointment, read your test results, renew a prescription or communicate with your care team.     To access your existing account, please contact your [facility] or call [phone] for assistance.        Care EveryWhere ID     This is your Care  EveryWhere ID. This could be used by other organizations to access your [place] medical records  [id number]         Blood Pressure from Last 3 Encounters:   04/06/17 120/74   11/17/16 103/69   11/09/16 116/79    Weight from Last 3 Encounters:   04/19/17 95.3 kg (210 lb)   04/06/17 95.3 kg (210 lb)   03/01/17 92.5 kg (204 lb)              We Performed the Following     No Charge, Hearing Aid Clinic Visit          Today's Medication Changes          These changes are accurate as of: 5/11/17 10:38 AM.  If you have any questions, ask your nurse or doctor.               These medicines have changed or have updated prescriptions.        Dose/Directions    triamcinolone 0.1 % ointment   Commonly known as:  KENALOG   This may have changed:    - when to take this  - reasons to take this  - additional instructions   Used for:  Other eczema        Apply sparingly to affected area three times a day   Quantity:  80 g   Refills:  2                Primary Care Provider Office Phone # Fax #    To Whitt [phone]       [address] [address]        Thank you!     Thank you for choosing [facility] AUDIOLOGY  for your care. Our goal is always to provide you with excellent care. Hearing back from our patients is one way we can continue to improve our services. Please take a few minutes to complete the written survey that you may receive in the mail after your visit with us. Thank you!             Your Updated Medication List - Protect others around you: Learn how to safely use, store and throw away your medicines at www.disposemymeds.org.          This list is accurate as of: 5/11/17 10:38 AM.  Always use your most recent med list.                   Brand Name Dispense Instructions for use    acetaminophen 325 MG tablet    TYLENOL    100 tablet    Take 2 tablets (650 mg) by mouth every 6 hours as needed for mild pain       IRON SUPPLEMENT PO      Take 325 mg  by mouth daily       triamcinolone 0.1 % ointment    KENALOG    80 g    Apply sparingly to affected area three times a day       UNABLE TO FIND      MEDICATION NAME: chamois creme       VITAMIN D (CHOLECALCIFEROL) PO      Take 1,000 Units by mouth daily

## 2017-05-12 DIAGNOSIS — Z52.4 KIDNEY DONOR: ICD-10-CM

## 2017-05-12 LAB
ALBUMIN UR-MCNC: NEGATIVE MG/DL
APPEARANCE UR: CLEAR
BILIRUB UR QL STRIP: NEGATIVE
COLOR UR AUTO: ABNORMAL
GLUCOSE UR STRIP-MCNC: NEGATIVE MG/DL
HGB UR QL STRIP: NEGATIVE
KETONES UR STRIP-MCNC: NEGATIVE MG/DL
LEUKOCYTE ESTERASE UR QL STRIP: NEGATIVE
MUCOUS THREADS #/AREA URNS LPF: PRESENT /LPF
NITRATE UR QL: NEGATIVE
PH UR STRIP: 6 PH (ref 5–7)
RBC #/AREA URNS AUTO: 0 /HPF (ref 0–2)
SP GR UR STRIP: 1.01 (ref 1–1.03)
SQUAMOUS #/AREA URNS AUTO: <1 /HPF (ref 0–1)
URN SPEC COLLECT METH UR: ABNORMAL
UROBILINOGEN UR STRIP-MCNC: 0 MG/DL (ref 0–2)
WBC #/AREA URNS AUTO: 0 /HPF (ref 0–2)

## 2017-05-15 ENCOUNTER — MYC MEDICAL ADVICE (OUTPATIENT)
Dept: FAMILY MEDICINE | Facility: CLINIC | Age: 47
End: 2017-05-15

## 2017-05-15 NOTE — TELEPHONE ENCOUNTER
Please see MyChart message below.  Cr and Albumin both show too low to calculate.    Routed to PCP to advise.    Suzette Rice RN  [facility]

## 2017-06-07 ENCOUNTER — OFFICE VISIT (OUTPATIENT)
Dept: FAMILY MEDICINE | Facility: CLINIC | Age: 47
End: 2017-06-07
Payer: COMMERCIAL

## 2017-06-07 VITALS
HEART RATE: 74 BPM | BODY MASS INDEX: 28.6 KG/M2 | TEMPERATURE: 99 F | DIASTOLIC BLOOD PRESSURE: 70 MMHG | SYSTOLIC BLOOD PRESSURE: 107 MMHG | OXYGEN SATURATION: 96 % | WEIGHT: 205 LBS

## 2017-06-07 DIAGNOSIS — M75.42 IMPINGEMENT SYNDROME OF LEFT SHOULDER: Primary | ICD-10-CM

## 2017-06-07 PROCEDURE — 99213 OFFICE O/P EST LOW 20 MIN: CPT | Performed by: FAMILY MEDICINE

## 2017-06-07 ASSESSMENT — PAIN SCALES - GENERAL: PAINLEVEL: MODERATE PAIN (5)

## 2017-06-07 NOTE — MR AVS SNAPSHOT
After Visit Summary   6/7/2017    Suhail Mustafa    MRN: [id number]           Patient Information     Date Of Birth          1970        Visit Information        Provider Department      6/7/2017 6:00 PM To Whitt MD [facility]        Today's Diagnoses     Impingement syndrome of left shoulder    -  1       Follow-ups after your visit        Additional Services     JENNIFER PT, HAND, AND CHIROPRACTIC REFERRAL       **This order will print in the [facility] Scheduling Office**    Physical Therapy, Hand Therapy and Chiropractic Care are available through:    *[facility]  *[facility]  *[facility]    Call one number to schedule at any of the above locations: [phone].    Your provider has referred you to: Physical Therapy at [facility] or [facility]    Indication/Reason for Referral: Shoulder Pain  Onset of Illness: 2 months ago  Therapy Orders: Evaluate and Treat  Special Programs: None  Special Request: None    Veronika Banda      Additional Comments for the Therapist or Chiropractor:     Please be aware that coverage of these services is subject to the terms and limitations of your health insurance plan.  Call member services at your health plan with any benefit or coverage questions.      Please bring the following to your appointment:    *Your personal calendar for scheduling future appointments  *Comfortable clothing                  Follow-up notes from your care team     Return if symptoms worsen or fail to improve.      Your next 10 appointments already scheduled     Oct 18, 2017  7:00 AM CDT   (Arrive by 6:45 AM)   Return Visit with Yaneth Rogel MD   [facility] ([facility])    [address] [place] [address] [phone]              Who to contact     If you have questions or need follow up information about today's clinic visit or your schedule please  contact [facility] directly at [phone].  Normal or non-critical lab and imaging results will be communicated to you by MyChart, letter or phone within 4 business days after the clinic has received the results. If you do not hear from us within 7 days, please contact the clinic through SpongeFishhart or phone. If you have a critical or abnormal lab result, we will notify you by phone as soon as possible.  Submit refill requests through [email or web address] or call your pharmacy and they will forward the refill request to us. Please allow 3 business days for your refill to be completed.          Additional Information About Your Visit        SpongeFishharPhysioSonics Information     [email or web address] gives you secure access to your electronic health record. If you see a primary care provider, you can also send messages to your care team and make appointments. If you have questions, please call your primary care clinic.  If you do not have a primary care provider, please call [phone] and they will assist you.        Care EveryWhere ID     This is your Care EveryWhere ID. This could be used by other organizations to access your [place] medical records  [id number]        Your Vitals Were     Pulse Temperature Pulse Oximetry BMI (Body Mass Index)          74 99  F (37.2  C) (Oral) 96% 28.6 kg/m2         Blood Pressure from Last 3 Encounters:   06/07/17 107/70   04/06/17 120/74   11/17/16 103/69    Weight from Last 3 Encounters:   06/07/17 205 lb (93 kg)   04/19/17 210 lb (95.3 kg)   04/06/17 210 lb (95.3 kg)              We Performed the Following     EJNNIFER PT, HAND, AND CHIROPRACTIC REFERRAL          Today's Medication Changes          These changes are accurate as of: 6/7/17  6:21 PM.  If you have any questions, ask your nurse or doctor.               These medicines have changed or have updated prescriptions.        Dose/Directions    triamcinolone 0.1 % ointment   Commonly known as:  KENALOG   This may have changed:    - when to  take this  - reasons to take this  - additional instructions   Used for:  Other eczema        Apply sparingly to affected area three times a day   Quantity:  80 g   Refills:  2                Primary Care Provider Office Phone # Fax #    To Whitt [phone]       [facility] [address]  [place]. \[facility].\"" [address]        Thank you!     Thank you for choosing [facility]  for your care. Our goal is always to provide you with excellent care. Hearing back from our patients is one way we can continue to improve our services. Please take a few minutes to complete the written survey that you may receive in the mail after your visit with us. Thank you!             Your Updated Medication List - Protect others around you: Learn how to safely use, store and throw away your medicines at www.disposemymeds.org.          This list is accurate as of: 6/7/17  6:21 PM.  Always use your most recent med list.                   Brand Name Dispense Instructions for use    acetaminophen 325 MG tablet    TYLENOL    100 tablet    Take 2 tablets (650 mg) by mouth every 6 hours as needed for mild pain       IRON SUPPLEMENT PO      Take 325 mg by mouth daily       triamcinolone 0.1 % ointment    KENALOG    80 g    Apply sparingly to affected area three times a day       VITAMIN D (CHOLECALCIFEROL) PO      Take 1,000 Units by mouth daily

## 2017-06-07 NOTE — NURSING NOTE
"Chief Complaint   Patient presents with     Shoulder Pain     Left shoulder, Pain has not decreased in the past month. Working out is aggravating the shoulder       Initial /70 (BP Location: Right arm, Patient Position: Chair, Cuff Size: Adult Large)  Pulse 74  Temp 99  F (37.2  C) (Oral)  Wt 205 lb (93 kg)  SpO2 96%  BMI 28.6 kg/m2 Estimated body mass index is 28.6 kg/(m^2) as calculated from the following:    Height as of 4/19/17: 5' 10.98\" (1.803 m).    Weight as of this encounter: 205 lb (93 kg).  Medication Reconciliation: complete   Gaby Segura CMA       "

## 2017-06-07 NOTE — PROGRESS NOTES
SUBJECTIVE:                                                    Suhail Mustafa is a 46 year old male who presents to clinic today for the following health issues:      Joint Pain     Onset: 2 months    Description:   Location: left shoulder  Character: Sharp    Intensity: 5/10    Progression of Symptoms: worse    Accompanying Signs & Symptoms:  Other symptoms: none   History:     Previous similar pain: YES  2 years ago    Precipitating factors:   Trauma or overuse: YES    Alleviating factors:  Improved by: heat and acetaminophen       Therapies Tried and outcome: Tylenol, Heat      He's had no specific injury to the left shoulder. He does a lot of general weight lifting and other exercising, some of which seems to bother his left shoulder. He has pain when raising the left arm up overhead.  He had something similar to this a couple of years ago when he was kickboxing and doing other activities. He rested it for a while and it got better.    Problem list and histories reviewed & adjusted, as indicated.  Additional history: as documented    Patient Active Problem List   Diagnosis     CARDIOVASCULAR SCREENING; LDL GOAL LESS THAN 160     FH: kidney cancer     Conductive hearing loss in left ear     Donor of kidney for transplant     Lichen simplex chronicus     Other eczema     ACP (advance care planning)     Past Surgical History:   Procedure Laterality Date     ENT SURGERY  multiple times    pe tubes     ENT SURGERY      tonsils     ENT SURGERY  11/2011    Tubes 5 times since last time 10/21/14     GENITOURINARY SURGERY  Oct. 2013    Vasectomy     LAPAROSCOPIC DONOR HAND ASSISTED KIDNEY LIVING RELATED N/A 10/13/2016    Procedure: LAPAROSCOPIC DONOR HAND ASSISTED KIDNEY LIVING RELATED;  Surgeon: Tigre Quarles MD;  [facility]     LASER DIODE TYMPANOMASTOIDECTOMY Left 3/31/2015    Procedure: LASER DIODE TYMPANOMASTOIDECTOMY;  Surgeon: Yaneth Rogel MD;  [facility]     ORTHOPEDIC SURGERY  child     dislocated hip     OSSICULAR CHAIN RECONSTRUCTION N/A 10/9/2015    Procedure: OSSICULAR CHAIN RECONSTRUCTION;  Surgeon: Yaneth Rogel MD;  [facility]     TRANSPLANT       TYMPANOPLASTY Left 10/9/2015    Procedure: TYMPANOPLASTY;  Surgeon: Yaneth Rogel MD;  [facility]       Social History   Substance Use Topics     Smoking status: Never Smoker     Smokeless tobacco: Never Used     Alcohol use 0.6 oz/week     1 Standard drinks or equivalent per week      Comment: once per week with a meal     Family History   Problem Relation Age of Onset     GASTROINTESTINAL DISEASE Mother      colostomy for diverticulitis     CEREBROVASCULAR DISEASE Mother      after giving birth     KIDNEY DISEASE Brother      dysplasia     Genitourinary Problems Brother      Cardiovascular Brother      ablation     HEART DISEASE Father      Genitourinary Problems Father      Type 2 Diabetes Father      obese     CANCER Paternal Grandfather      bone cancer     Aneurysm Paternal Uncle      brain aneurysm on coumadin     Hypertension Brother          Current Outpatient Prescriptions   Medication Sig Dispense Refill     acetaminophen (TYLENOL) 325 MG tablet Take 2 tablets (650 mg) by mouth every 6 hours as needed for mild pain 100 tablet 0     triamcinolone (KENALOG) 0.1 % ointment Apply sparingly to affected area three times a day (Patient taking differently: 3 times daily as needed Apply sparingly to affected area three times a day) 80 g 2     VITAMIN D, CHOLECALCIFEROL, PO Take 1,000 Units by mouth daily        Ferrous Sulfate (IRON SUPPLEMENT PO) Take 325 mg by mouth daily       No Known Allergies    Reviewed and updated as needed this visit by clinical staff  Tobacco  Allergies  Med Hx  Surg Hx  Fam Hx  Soc Hx      Reviewed and updated as needed this visit by Provider         ROS:  Otherwise noncontributory    OBJECTIVE:                                                    /70 (BP Location: Right arm, Patient Position:  Chair, Cuff Size: Adult Large)  Pulse 74  Temp 99  F (37.2  C) (Oral)  Wt 205 lb (93 kg)  SpO2 96%  BMI 28.6 kg/m2  Body mass index is 28.6 kg/(m^2).  GENERAL: healthy, alert and no distress  MS: He has discomfort in the left shoulder when he abducts his left arm to 90  or more. He has some discomfort with decreased range of motion when reaching his left arm behind his back. He has a positive supraspinatus empty can test. He has some discomfort with resistance to external rotation, but not internal rotation.      Diagnostic Test Results:  None      ASSESSMENT/PLAN:                                                        ICD-10-CM    1. Impingement syndrome of left shoulder M75.42 [facility] PT, HAND, AND CHIROPRACTIC REFERRAL     I recommended relative rest, ice, and acetaminophen  We will avoid oral NSAIDs because of his single kidney   I will refer him to physical therapy for instruction in home exercise program  If symptoms persist, then return for a cortisone injection    To Whitt MD  [facility]

## 2017-06-16 ENCOUNTER — THERAPY VISIT (OUTPATIENT)
Dept: PHYSICAL THERAPY | Facility: CLINIC | Age: 47
End: 2017-06-16
Payer: COMMERCIAL

## 2017-06-16 DIAGNOSIS — M25.512 ACUTE PAIN OF LEFT SHOULDER: Primary | ICD-10-CM

## 2017-06-16 PROCEDURE — 97161 PT EVAL LOW COMPLEX 20 MIN: CPT | Mod: GP | Performed by: PHYSICAL THERAPIST

## 2017-06-16 PROCEDURE — 97110 THERAPEUTIC EXERCISES: CPT | Mod: GP | Performed by: PHYSICAL THERAPIST

## 2017-06-16 PROCEDURE — 97530 THERAPEUTIC ACTIVITIES: CPT | Mod: GP | Performed by: PHYSICAL THERAPIST

## 2017-06-16 NOTE — MR AVS SNAPSHOT
After Visit Summary   6/16/2017    Suhail Mustafa    MRN: [id number]           Patient Information     Date Of Birth          1970        Visit Information        Provider Department      6/16/2017 4:40 PM Gaby Mcintyre PT [facility]        Today's Diagnoses     Acute pain of left shoulder    -  1       Follow-ups after your visit        Your next 10 appointments already scheduled     Jun 23, 2017  4:40 PM CDT   JENNIFER Extremity with Gaby Mcintyre PT   [facility] Physical Therapy ([facility]  )    [address] [place] [address]   [phone]            Jun 30, 2017  4:40 PM CDT   JENNIFER Extremity with Gaby Mcintyre PT   [facility] ([facility]  )    [address] [place] [id number] [phone]            Oct 18, 2017  7:00 AM CDT   (Arrive by 6:45 AM)   Return Visit with Yaneth Rogel MD   [facility] Nose and Throat ([facility])    [address] [place] [address] [phone]              Who to contact     If you have questions or need follow up information about today's clinic visit or your schedule please contact [facility] PHYSICAL THERAPY directly at [phone].  Normal or non-critical lab and imaging results will be communicated to you by MyChart, letter or phone within 4 business days after the clinic has received the results. If you do not hear from us within 7 days, please contact the clinic through MyChart or phone. If you have a critical or abnormal lab result, we will notify you by phone as soon as possible.  Submit refill requests through Backchat or call your pharmacy and they will forward the refill request to us. Please allow 3 business days for your refill to be completed.          Additional Information About  Your Visit        EMBIhart Information     [employer] gives you secure access to your electronic health record. If you see a primary care provider, you can also send messages to your care team and make appointments. If you have questions, please call your primary care clinic.  If you do not have a primary care provider, please call [phone] and they will assist you.        Care EveryWhere ID     This is your Care EveryWhere ID. This could be used by other organizations to access your [place] medical records  [id number]         Blood Pressure from Last 3 Encounters:   06/07/17 107/70   04/06/17 120/74   11/17/16 103/69    Weight from Last 3 Encounters:   06/07/17 93 kg (205 lb)   04/19/17 95.3 kg (210 lb)   04/06/17 95.3 kg (210 lb)              We Performed the Following     JENNIFER Inital Eval Report     PT Eval, Low Complexity (80326)     Therapeutic Activities     Therapeutic Exercises          Today's Medication Changes          These changes are accurate as of: 6/16/17 11:59 PM.  If you have any questions, ask your nurse or doctor.               These medicines have changed or have updated prescriptions.        Dose/Directions    triamcinolone 0.1 % ointment   Commonly known as:  KENALOG   This may have changed:    - when to take this  - reasons to take this  - additional instructions   Used for:  Other eczema        Apply sparingly to affected area three times a day   Quantity:  80 g   Refills:  2                Primary Care Provider Office Phone # Fax #    To Whitt [phone]       [address] [address]        Thank you!     Thank you for choosing [facility] PHYSICAL THERAP[facility]  for your care. Our goal is always to provide you with excellent care. Hearing back from our patients is one way we can continue to improve our services. Please take a few minutes to complete the written survey that you may receive  in the mail after your visit with us. Thank you!             Your Updated Medication List - Protect others around you: Learn how to safely use, store and throw away your medicines at www.disposemymeds.org.          This list is accurate as of: 6/16/17 11:59 PM.  Always use your most recent med list.                   Brand Name Dispense Instructions for use    acetaminophen 325 MG tablet    TYLENOL    100 tablet    Take 2 tablets (650 mg) by mouth every 6 hours as needed for mild pain       IRON SUPPLEMENT PO      Take 325 mg by mouth daily       triamcinolone 0.1 % ointment    KENALOG    80 g    Apply sparingly to affected area three times a day       VITAMIN D (CHOLECALCIFEROL) PO      Take 1,000 Units by mouth daily

## 2017-06-17 PROBLEM — M25.512 ACUTE PAIN OF LEFT SHOULDER: Status: ACTIVE | Noted: 2017-06-17

## 2017-06-17 NOTE — PROGRESS NOTES
[facility] Initial Evaluation          Subjective:    Patient is a 46 year old male presenting with rehab left shoulder hpi.   Suhail Mustafa is a 46 year old male with a left shoulder condition.  Condition occurred with:  Repetition/overuse (Pt. has had a gradual onset of L sh pain with returning to gym workouts after a year off. Shortly after resuming the weight training the pain started. Pt. has had to cut way back on his workouts. He has a hx of L sh dislocation 20 (+) years ago. ).  Condition occurred: during recreation/sport.  This is a new condition  4-16-17.    Patient reports pain:  Anterior and in the joint.  Radiates to:  Upper arm.  Pain is described as sharp and is intermittent and reported as 6/10.  Associated symptoms:  Loss of motion/stiffness and loss of strength. Pain is worse in the P.M..  Symptoms are exacerbated by using arm overhead, using arm behind back, lifting and lying on extremity and relieved by rest.  Since onset symptoms are unchanged.  Special testing: none.  Previous treatment: none.    General health as reported by patient is good.                                              Objective:    Standing Alignment:      Shoulder/UE:  Elevated scapula L                  Flexibility/Screens:   Positive screens:  Shoulder                             Shoulder Evaluation:  ROM:  AROM:    Flexion:  Left:  135    Right:  170    Abduction:  Left: 133   Right:  165    Internal Rotation:  Left:  51    Right:  80  External Rotation:  Left:  66    Right:  90                      Strength:    Flexion: Left:4/5   Pain:      Extension:  Left: 4+/5    Pain:      Abduction:  Left: 4-/5  Pain:        Internal Rotation:  Left:4+/5     Pain:      External Rotation:   Left:4-/5     Pain:     Horizontal Abduction:  Left:4-/5     Pain:            Stability Testing:  normal      Special Tests:  Special tests assessed shoulder: (+) Hawkin's; (+) Page's.  Left shoulder positive for the  following special tests:  Labral and Impingement  Left shoulder negative for the following special tests:  Rotator cuff tear    Palpation:  normal      Mobility Tests:      Glenohumeral posterior left:  Hypomobile                                               General     ROS    Assessment/Plan:      Patient is a 46 year old male with left side shoulder complaints.    Patient has the following significant findings with corresponding treatment plan.                Diagnosis 1:  L shoulder pain  Pain -  self management and education  Decreased ROM/flexibility - manual therapy and therapeutic exercise  Decreased strength - therapeutic exercise and therapeutic activities  Impaired muscle performance - neuro re-education  Decreased function - therapeutic activities    Therapy Evaluation Codes:   1) History comprised of:   Personal factors that impact the plan of care:      None.    Comorbidity factors that impact the plan of care are:      None.     Medications impacting care: None.  2) Examination of Body Systems comprised of:   Body structures and functions that impact the plan of care:      Shoulder.   Activity limitations that impact the plan of care are:      Lifting.  3) Clinical presentation characteristics are:   Stable/Uncomplicated.  4) Decision-Making    Low complexity using standardized patient assessment instrument and/or measureable assessment of functional outcome.  Cumulative Therapy Evaluation is: Low complexity.    Previous and current functional limitations:  (See Goal Flow Sheet for this information)    Short term and Long term goals: (See Goal Flow Sheet for this information)     Communication ability:  Patient appears to be able to clearly communicate and understand verbal and written communication and follow directions correctly.  Treatment Explanation - The following has been discussed with the patient:   RX ordered/plan of care  Anticipated outcomes  Possible risks and side effects  This patient  would benefit from PT intervention to resume normal activities.   Rehab potential is good.    Frequency:  1 X week, once daily  Duration:  for 2 weeks tapering to 2 X a month over 6 weeks  Discharge Plan:  Achieve all LTG.  Independent in home treatment program.  Reach maximal therapeutic benefit.    Please refer to the daily flowsheet for treatment today, total treatment time and time spent performing 1:1 timed codes.

## 2017-06-19 NOTE — PROGRESS NOTES
Subjective:    Patient is a 46 year old male presenting with rehab left ankle/foot hpi.                                      Pertinent medical history includes:  None.  Medical allergies: no.  Other surgeries include:  Other (Donated a Kidney).  Current medications:  None as reported by patient.  Current occupation is Developer.    Primary job tasks include:  Other (Computer Work).                                Objective:    System    Physical Exam    General     ROS    Assessment/Plan:

## 2017-06-23 ENCOUNTER — THERAPY VISIT (OUTPATIENT)
Dept: PHYSICAL THERAPY | Facility: CLINIC | Age: 47
End: 2017-06-23
Payer: COMMERCIAL

## 2017-06-23 DIAGNOSIS — M25.512 ACUTE PAIN OF LEFT SHOULDER: ICD-10-CM

## 2017-06-23 PROCEDURE — 97110 THERAPEUTIC EXERCISES: CPT | Mod: GP | Performed by: PHYSICAL THERAPIST

## 2017-06-23 PROCEDURE — 97530 THERAPEUTIC ACTIVITIES: CPT | Mod: GP | Performed by: PHYSICAL THERAPIST

## 2017-06-23 NOTE — MR AVS SNAPSHOT
After Visit Summary   6/23/2017    Suhail Mustafa    MRN: [id number]           Patient Information     Date Of Birth          1970        Visit Information        Provider Department      6/23/2017 4:40 PM Gaby Mcintyre, PT [facility] Physical Therapy        Today's Diagnoses     Acute pain of left shoulder           Follow-ups after your visit        Your next 10 appointments already scheduled     Jul 07, 2017  8:10 AM CDT   JENNIFER Extremity with Gaby Mcintyre PT   [facility]    [address] [place] [id number] [phone]            Oct 18, 2017  7:00 AM CDT   (Arrive by 6:45 AM)   Return Visit with Yaneth Rogel MD   [facility] Ear Nose and Throat ([facility] [place])    [address] [place] [id number] [phone]              Who to contact     If you have questions or need follow up information about today's clinic visit or your schedule please contact [facility] PHYSICAL THERAPY directly at [phone].  Normal or non-critical lab and imaging results will be communicated to you by MyChart, letter or phone within 4 business days after the clinic has received the results. If you do not hear from us within 7 days, please contact the clinic through [employer] or phone. If you have a critical or abnormal lab result, we will notify you by phone as soon as possible.  Submit refill requests through [employer] or call your pharmacy and they will forward the refill request to us. Please allow 3 business days for your refill to be completed.          Additional Information About Your Visit        MyChart Information     [employer] gives you secure access to your electronic health record. If you see a primary care provider, you can also send messages to your care team and make appointments. If you have  questions, please call your primary care clinic.  If you do not have a primary care provider, please call [phone] and they will assist you.        Care EveryWhere ID     This is your Care EveryWhere ID. This could be used by other organizations to access your [place] medical records  [id number]         Blood Pressure from Last 3 Encounters:   06/07/17 107/70   04/06/17 120/74   11/17/16 103/69    Weight from Last 3 Encounters:   06/07/17 93 kg (205 lb)   04/19/17 95.3 kg (210 lb)   04/06/17 95.3 kg (210 lb)              We Performed the Following     Therapeutic Activities     Therapeutic Exercises          Today's Medication Changes          These changes are accurate as of: 6/23/17  5:40 PM.  If you have any questions, ask your nurse or doctor.               These medicines have changed or have updated prescriptions.        Dose/Directions    triamcinolone 0.1 % ointment   Commonly known as:  KENALOG   This may have changed:    - when to take this  - reasons to take this  - additional instructions   Used for:  Other eczema        Apply sparingly to affected area three times a day   Quantity:  80 g   Refills:  2                Primary Care Provider Office Phone # Fax #    To Whitt [phone]       [address] [facility] [place] [address]        Equal Access to Services     ALEXANDER RDZ AH: Hadii aad ku hadasho Soomaali, waaxda luqadaha, qaybta kaalmada adeegyada, dixie cobb hayananda pimentel. So [facility] [phone].    ATENCIÓN: Si habla español, tiene a martin disposición servicios gratuitos de asistencia lingüística. Llame al [phone].    We comply with applicable federal civil rights laws and [place] laws. We do not discriminate on the basis of race, color, national origin, age, disability sex, sexual orientation or gender identity.            Thank you!     Thank you for choosing [facility]  THERAPY  for your care. Our goal is always to provide you with excellent care. Hearing back from our patients is one way we can continue to improve our services. Please take a few minutes to complete the written survey that you may receive in the mail after your visit with us. Thank you!             Your Updated Medication List - Protect others around you: Learn how to safely use, store and throw away your medicines at www.disposemymeds.org.          This list is accurate as of: 6/23/17  5:40 PM.  Always use your most recent med list.                   Brand Name Dispense Instructions for use Diagnosis    acetaminophen 325 MG tablet    TYLENOL    100 tablet    Take 2 tablets (650 mg) by mouth every 6 hours as needed for mild pain    Donor of kidney for transplant       IRON SUPPLEMENT PO      Take 325 mg by mouth daily        triamcinolone 0.1 % ointment    KENALOG    80 g    Apply sparingly to affected area three times a day    Other eczema       VITAMIN D (CHOLECALCIFEROL) PO      Take 1,000 Units by mouth daily

## 2017-06-23 NOTE — PROGRESS NOTES
Subjective:    HPI                    Objective:    System    Physical Exam    General     ROS    Assessment/Plan:      SUBJECTIVE  Subjective changes as noted by pt:   Pt. reports some aching soreness with the ex's but not bad.   Current pain level:  5/10   Changes in function:  Yes (See Goal flowsheet attached for changes in current functional level)     Adverse reaction to treatment or activity:  None    OBJECTIVE  Changes in objective findings:   AROM L sh flex 160; abd 145; IR 59; ER 69; post cap 40. Strength L sh flex 4/5; abd 4-/5; ER 4-/5; hor abd 4-/5; ext 4/5     ASSESSMENT  Suhail continues to require intervention to meet STG and LTG's: PT  Patient is progressing as expected.  Response to therapy has shown an improvement in  pain level and ROM   Progress made towards STG/LTG?  Yes (See Goal flowsheet attached for updates on achievement of STG and LTG)    PLAN  Current treatment program is being advanced to more complex exercises.    PTA/ATC plan:  N/A    Please refer to the daily flowsheet for treatment today, total treatment time and time spent performing 1:1 timed codes.

## 2017-07-07 ENCOUNTER — THERAPY VISIT (OUTPATIENT)
Dept: PHYSICAL THERAPY | Facility: CLINIC | Age: 47
End: 2017-07-07
Payer: COMMERCIAL

## 2017-07-07 DIAGNOSIS — M25.512 ACUTE PAIN OF LEFT SHOULDER: ICD-10-CM

## 2017-07-07 PROCEDURE — 97112 NEUROMUSCULAR REEDUCATION: CPT | Mod: GP | Performed by: PHYSICAL THERAPIST

## 2017-07-07 PROCEDURE — 97110 THERAPEUTIC EXERCISES: CPT | Mod: GP | Performed by: PHYSICAL THERAPIST

## 2017-07-07 NOTE — PROGRESS NOTES
Subjective:    HPI                    Objective:    System    Physical Exam    General     ROS    Assessment/Plan:      SUBJECTIVE  Subjective changes as noted by pt:   Pt. cont to note slow gradual progress with his L shoulder. He has a low grade aching all the time and sharper pain with certain overhead lifting.   Current pain level:  4/10   Changes in function:  Yes (See Goal flowsheet attached for changes in current functional level)     Adverse reaction to treatment or activity:  None    OBJECTIVE  Changes in objective findings:   AROM L sh flex 162; abd 155; IR 62; ER 75; psot cap 57. Strength L sh flex  4+/5; abd 4/5; ER 4/5; ext 4/5; hor abd 4-/5     ASSESSMENT  Suhail continues to require intervention to meet STG and LTG's: PT  Patient's symptoms are resolving.  Response to therapy has shown an improvement in  ROM  and strength  Progress made towards STG/LTG?  Yes (See Goal flowsheet attached for updates on achievement of STG and LTG)    PLAN  Current treatment program is being advanced to more complex exercises.    PTA/ATC plan:  N/A    Please refer to the daily flowsheet for treatment today, total treatment time and time spent performing 1:1 timed codes.

## 2017-07-07 NOTE — MR AVS SNAPSHOT
After Visit Summary   7/7/2017    Suhail Mustafa    MRN: [id number]           Patient Information     Date Of Birth          1970        Visit Information        Provider Department      7/7/2017 8:10 AM Gaby Mcinytre, PT [facility] Physical Therapy        Today's Diagnoses     Acute pain of left shoulder           Follow-ups after your visit        Your next 10 appointments already scheduled     Aug 14, 2017  5:20 PM CDT   JENNIFER Extremity with Gaby Mcintyre PT   [facility]    [address] [place] [id number] [phone]            Oct 18, 2017  7:00 AM CDT   (Arrive by 6:45 AM)   Return Visit with Yaneth Rogel MD   [facility] [place] [facility] Ear Nose and Throat ([facility] [place])    [address] [place] [id number] [phone]              Who to contact     If you have questions or need follow up information about today's clinic visit or your schedule please contact [facility] PHYSICAL THERAPY directly at [phone].  Normal or non-critical lab and imaging results will be communicated to you by MyChart, letter or phone within 4 business days after the clinic has received the results. If you do not hear from us within 7 days, please contact the clinic through My [employer] or phone. If you have a critical or abnormal lab result, we will notify you by phone as soon as possible.  Submit refill requests through "[employer] or call your pharmacy and they will forward the refill request to us. Please allow 3 business days for your refill to be completed.          Additional Information About Your Visit        MyChart Information     "[employer] gives you secure access to your electronic health record. If you see a primary care provider, you can also send messages to your care team and make appointments. If you have questions,  please call your primary care clinic.  If you do not have a primary care provider, please call [phone] and they will assist you.        Care EveryWhere ID     This is your Care EveryWhere ID. This could be used by other organizations to access your [place] medical records  [id number]         Blood Pressure from Last 3 Encounters:   06/07/17 107/70   04/06/17 120/74   11/17/16 103/69    Weight from Last 3 Encounters:   06/07/17 93 kg (205 lb)   04/19/17 95.3 kg (210 lb)   04/06/17 95.3 kg (210 lb)              We Performed the Following     Neuromuscular Re-Education     Therapeutic Exercises          Today's Medication Changes          These changes are accurate as of: 7/7/17  1:36 PM.  If you have any questions, ask your nurse or doctor.               These medicines have changed or have updated prescriptions.        Dose/Directions    triamcinolone 0.1 % ointment   Commonly known as:  KENALOG   This may have changed:    - when to take this  - reasons to take this  - additional instructions   Used for:  Other eczema        Apply sparingly to affected area three times a day   Quantity:  80 g   Refills:  2                Primary Care Provider Office Phone # Fax #    To Whitt [phone]       [address] [facility] [address]        Equal Access to Services     ALEXANDER RDZ AH: Hadii aad ku hadasho Soomaali, waaxda luqadaha, qaybta kaalmada adeegyada, dixie pimentel. So [facility] [phone].    ATENCIÓN: Si habla español, tiene a martin disposición servicios gratuitos de asistencia lingüística. Llame al [phone].    We comply with applicable federal civil rights laws and [place] laws. We do not discriminate on the basis of race, color, national origin, age, disability sex, sexual orientation or gender identity.            Thank you!     Thank you for choosing [facility]ICAL THERAPY  for  your care. Our goal is always to provide you with excellent care. Hearing back from our patients is one way we can continue to improve our services. Please take a few minutes to complete the written survey that you may receive in the mail after your visit with us. Thank you!             Your Updated Medication List - Protect others around you: Learn how to safely use, store and throw away your medicines at www.disposemymeds.org.          This list is accurate as of: 7/7/17  1:36 PM.  Always use your most recent med list.                   Brand Name Dispense Instructions for use Diagnosis    acetaminophen 325 MG tablet    TYLENOL    100 tablet    Take 2 tablets (650 mg) by mouth every 6 hours as needed for mild pain    Donor of kidney for transplant       IRON SUPPLEMENT PO      Take 325 mg by mouth daily        triamcinolone 0.1 % ointment    KENALOG    80 g    Apply sparingly to affected area three times a day    Other eczema       VITAMIN D (CHOLECALCIFEROL) PO      Take 1,000 Units by mouth daily

## 2017-08-07 ENCOUNTER — TELEPHONE (OUTPATIENT)
Dept: TRANSPLANT | Facility: CLINIC | Age: 47
End: 2017-08-07

## 2017-08-07 NOTE — TELEPHONE ENCOUNTER
Suhail sent a My chart message asking if he could donate blood after kidney donation and also if he received blood during donation.  I left message and answered both questions.

## 2017-08-14 ENCOUNTER — THERAPY VISIT (OUTPATIENT)
Dept: PHYSICAL THERAPY | Facility: CLINIC | Age: 47
End: 2017-08-14
Payer: COMMERCIAL

## 2017-08-14 DIAGNOSIS — M25.512 ACUTE PAIN OF LEFT SHOULDER: ICD-10-CM

## 2017-08-14 PROCEDURE — 97110 THERAPEUTIC EXERCISES: CPT | Mod: GP | Performed by: PHYSICAL THERAPIST

## 2017-08-14 PROCEDURE — 97530 THERAPEUTIC ACTIVITIES: CPT | Mod: GP | Performed by: PHYSICAL THERAPIST

## 2017-08-14 NOTE — PROGRESS NOTES
Subjective:    HPI                    Objective:    System    Physical Exam    General     ROS    Assessment/Plan:      PROGRESS  REPORT    Progress reporting period is from 6-16-17 to 8-14-17.       SUBJECTIVE  Subjective changes noted by patient:   Pt. has made slow but steady progress in PT for L shoulder pain the past 2 months. Pt. displays a good increase in ROM and strength and he has less frequency of shoulder pain. he still has pain however with overhead lifting and reaching. He has not resumed any gym workouts or exercise as of yet but plans to try in the next few weeks. Pt. was educated today on how to safely resume and progress his gym workouts without aggravating his shoulder.      Current pain level is 4/10.     Previous pain level was  6/10.   Changes in function:  Yes (See Goal flowsheet attached for changes in current functional level)  Adverse reaction to treatment or activity: None    OBJECTIVE  Changes noted in objective findings:  AROM L sh flex 165; abd 160; IR 60; ER 80; post cap 60. Strength l sh flex 4+/5; abd 4+/5; ER 4+/5; ext 4+/5; hor abd 4/5     ASSESSMENT/PLAN  Updated problem list and treatment plan: Diagnosis 1:  L shoulder pain  Pain -  self management and education  Decreased ROM/flexibility - manual therapy and therapeutic exercise  Decreased strength - therapeutic exercise and therapeutic activities  Impaired muscle performance - neuro re-education  Decreased function - therapeutic activities  STG/LTGs have been met or progress has been made towards goals:  Yes (See Goal flow sheet completed today.)  Assessment of Progress: The patient's condition is improving.  Self Management Plans:  Patient has been instructed in a home treatment program.  Patient  has been instructed in self management of symptoms.  I have re-evaluated this patient and find that the nature, scope, duration and intensity of the therapy is appropriate for the medical condition of the patient.  Suhail continues to  require the following intervention to meet STG and LTG's:  PT    Recommendations:  This patient would benefit from continued therapy.     Frequency:  1 X a month, once daily  Duration:  for 1 month          Please refer to the daily flowsheet for treatment today, total treatment time and time spent performing 1:1 timed codes.

## 2017-08-14 NOTE — LETTER
[facility] ATHLETIC [facility] PHYSICAL THERAPY   [address] [address]  [phone]    August 15, 2017    Re: Suhail Mustafa   :   1970  MRN:  [id number]   REFERRING PHYSICIAN:   To Whitt    [facility] ATHLETIC [facility] PHYSICAL [facility]    Date of Initial Evaluation:  2017  Visits:  Rxs Used: 4  Reason for Referral:  Acute pain of left shoulder    EVALUATION SUMMARY      PROGRESS  REPORT    Progress reporting period is from 17 to 17.       SUBJECTIVE  Subjective changes noted by patient:   Pt. has made slow but steady progress in PT for L shoulder pain the past 2 months. Pt. displays a good increase in ROM and strength and he has less frequency of shoulder pain. he still has pain however with overhead lifting and reaching. He has not resumed any gym workouts or exercise as of yet but plans to try in the next few weeks. Pt. was educated today on how to safely resume and progress his gym workouts without aggravating his shoulder. Current pain level is 4/10.     Previous pain level was  6/10. Changes in function:  Yes (See Goal flowsheet attached for changes in current functional level) Adverse reaction to treatment or activity: None    OBJECTIVE  Changes noted in objective findings:  AROM L sh flex 165; abd 160; IR 60; ER 80; post cap 60. Strength l sh flex 4+/5; abd 4+/5; ER 4+/5; ext 4+/5; hor abd 4/5     ASSESSMENT/PLAN  Updated problem list and treatment plan: Diagnosis 1:  L shoulder pain  Pain -  self management and education  Decreased ROM/flexibility - manual therapy and therapeutic exercise  Decreased strength - therapeutic exercise and therapeutic activities  Impaired muscle performance - neuro re-education  Decreased function - therapeutic activities  STG/LTGs have been met or progress has been made towards goals:  Yes (See Goal flow sheet completed today.)  Assessment of Progress: The patient's condition is  improving.  Self Management Plans:  Patient has been instructed in a home treatment program.  Patient  has been instructed in self management of symptoms.  I have re-evaluated this patient and find that the nature, scope, duration and intensity of the therapy is appropriate for the medical condition of the patient.  Suhail continues to require the following intervention to meet STG and LTG's:  PT  Re: Suhail MICHELLE Ramsey   :   1970  Page:   2        Recommendations:  This patient would benefit from continued therapy.     Frequency:  1 X a month, once daily  Duration:  for 1 month          Thank you for your referral.    INQUIRIES  Therapist: Gaby Mcintyre, ELISA   [facility] PH[facility]ICAL TH[facility]AP[facility]  [address] [place] [id number]  Phone: [phone]  Fax: [phone]

## 2017-08-14 NOTE — MR AVS SNAPSHOT
After Visit Summary   8/14/2017    Suhail Mustafa    MRN: [id number]           Patient Information     Date Of Birth          1970        Visit Information        Provider Department      8/14/2017 5:20 PM Gaby Mcintyre, PT [facility] Physical Therapy        Today's Diagnoses     Acute pain of left shoulder           Follow-ups after your visit        Your next 10 appointments already scheduled     Sep 15, 2017  5:20 PM CDT   JENNIFER Extremity with Gaby Mcintyre PT   [facility] Physical Therapy ([place]  )    [address] [place] [id number] [phone]            Oct 18, 2017  7:00 AM CDT   (Arrive by 6:45 AM)   Return Visit with Yaneth Rogel MD   [facility] Ear Nose and Throat ([facility])    [address] [place] [address] [phone]              Who to contact     If you have questions or need follow up information about today's clinic visit or your schedule please contact [facility] PHYSICAL THERAPY directly at [phone].  Normal or non-critical lab and imaging results will be communicated to you by MyChart, letter or phone within 4 business days after the clinic has received the results. If you do not hear from us within 7 days, please contact the clinic through Postdeckhart or phone. If you have a critical or abnormal lab result, we will notify you by phone as soon as possible.  Submit refill requests through [employer] or call your pharmacy and they will forward the refill request to us. Please allow 3 business days for your refill to be completed.          Additional Information About Your Visit        MyChart Information     [employer] gives you secure access to your electronic health record. If you see a primary care provider, you can also send messages to your care team and make appointments. If you have  questions, please call your primary care clinic.  If you do not have a primary care provider, please call [phone] and they will assist you.        Care EveryWhere ID     This is your Care EveryWhere ID. This could be used by other organizations to access your [place] medical records  [id number]         Blood Pressure from Last 3 Encounters:   06/07/17 107/70   04/06/17 120/74   11/17/16 103/69    Weight from Last 3 Encounters:   06/07/17 93 kg (205 lb)   04/19/17 95.3 kg (210 lb)   04/06/17 95.3 kg (210 lb)              We Performed the Following     JENNIFER Progress Notes Report     Therapeutic Activities     Therapeutic Exercises          Today's Medication Changes          These changes are accurate as of: 8/14/17  6:15 PM.  If you have any questions, ask your nurse or doctor.               These medicines have changed or have updated prescriptions.        Dose/Directions    triamcinolone 0.1 % ointment   Commonly known as:  KENALOG   This may have changed:    - when to take this  - reasons to take this  - additional instructions   Used for:  Other eczema        Apply sparingly to affected area three times a day   Quantity:  80 g   Refills:  2                Primary Care Provider Office Phone # Fax #    To Whitt [phone]       [address] [address]        Equal Access to Services     ALEXANDER RDZ AH: Hadsharad mckeono Soomaali, waaxda luqadaha, qaybta kaalmada adeegyada, dixie pimentel. So [facility] [phone].    ATENCIÓN: Si habla español, tiene a martin disposición servicios gratuitos de asistencia lingüística. Damon al [phone].    We comply with applicable federal civil rights laws and [place] laws. We do not discriminate on the basis of race, color, national origin, age, disability sex, sexual orientation or gender identity.            Thank you!     Thank you for choosing [facility]  THERAPY  for your care. Our goal is always to provide you with excellent care. Hearing back from our patients is one way we can continue to improve our services. Please take a few minutes to complete the written survey that you may receive in the mail after your visit with us. Thank you!             Your Updated Medication List - Protect others around you: Learn how to safely use, store and throw away your medicines at www.disposemymeds.org.          This list is accurate as of: 8/14/17  6:15 PM.  Always use your most recent med list.                   Brand Name Dispense Instructions for use Diagnosis    acetaminophen 325 MG tablet    TYLENOL    100 tablet    Take 2 tablets (650 mg) by mouth every 6 hours as needed for mild pain    Donor of kidney for transplant       IRON SUPPLEMENT PO      Take 325 mg by mouth daily        triamcinolone 0.1 % ointment    KENALOG    80 g    Apply sparingly to affected area three times a day    Other eczema       VITAMIN D (CHOLECALCIFEROL) PO      Take 1,000 Units by mouth daily

## 2017-09-15 ENCOUNTER — THERAPY VISIT (OUTPATIENT)
Dept: PHYSICAL THERAPY | Facility: CLINIC | Age: 47
End: 2017-09-15
Payer: COMMERCIAL

## 2017-09-15 DIAGNOSIS — M25.512 ACUTE PAIN OF LEFT SHOULDER: ICD-10-CM

## 2017-09-15 PROCEDURE — 97530 THERAPEUTIC ACTIVITIES: CPT | Mod: GP | Performed by: PHYSICAL THERAPIST

## 2017-09-15 PROCEDURE — 97110 THERAPEUTIC EXERCISES: CPT | Mod: GP | Performed by: PHYSICAL THERAPIST

## 2017-09-15 PROCEDURE — 97112 NEUROMUSCULAR REEDUCATION: CPT | Mod: GP | Performed by: PHYSICAL THERAPIST

## 2017-09-15 NOTE — PROGRESS NOTES
Subjective:    HPI                    Objective:    System    Physical Exam    General     ROS    Assessment/Plan:      PROGRESS  REPORT    Progress reporting period is from 8-14-17 to 9-15-17.       SUBJECTIVE  Subjective changes noted by patient:   Pt. cont to make slow steady progress with his L shoulder. He continues to note decreasing pain and increasing strength. He was able to golf twice this past week but the first time was 18 holes and he had pain for a few days after. yesterday he golfed 9 holes and has no pain today. He has not yet resumed his gym workouts.      Current pain level is  3/10.     Previous pain level was  6/10.   Changes in function:  Yes (See Goal flowsheet attached for changes in current functional level)  Adverse reaction to treatment or activity: None    OBJECTIVE  Changes noted in objective findings:  AROM L sh IR 70; post capsule 62. Strength l sh flex 4+/5; abd 4+/5; ER 4+/5; hor abd 4/5; ext 5/5     ASSESSMENT/PLAN  Updated problem list and treatment plan: Diagnosis 1:  L shoulder pain  Pain -  self management and education  Decreased ROM/flexibility - manual therapy and therapeutic exercise  Decreased strength - therapeutic exercise and therapeutic activities  Impaired muscle performance - neuro re-education  Decreased function - therapeutic activities  STG/LTGs have been met or progress has been made towards goals:  Yes (See Goal flow sheet completed today.)  Assessment of Progress: The patient's condition is improving.  Self Management Plans:  Patient has been instructed in a home treatment program.  Patient  has been instructed in self management of symptoms.  I have re-evaluated this patient and find that the nature, scope, duration and intensity of the therapy is appropriate for the medical condition of the patient.  Suhail continues to require the following intervention to meet STG and LTG's:  PT    Recommendations:  This patient would benefit from continued therapy.      Frequency:  1 X a month, once daily  Duration:  for 1 month          Please refer to the daily flowsheet for treatment today, total treatment time and time spent performing 1:1 timed codes.

## 2017-09-15 NOTE — MR AVS SNAPSHOT
After Visit Summary   9/15/2017    Suhail Mustafa    MRN: [id number]           Patient Information     Date Of Birth          1970        Visit Information        Provider Department      9/15/2017 5:20 PM Gaby Mcintyre, PT [facility] Physical Therapy        Today's Diagnoses     Acute pain of left shoulder           Follow-ups after your visit        Your next 10 appointments already scheduled     Oct 18, 2017  7:00 AM CDT   (Arrive by 6:45 AM)   Return Visit with Yaneth Rogel MD   [facility] Ear Nose and Throat ([facility])    [address] [place] [id number]   [phone]            Oct 20, 2017  5:20 PM CDT   JENNIFER Extremity with Gaby Mcintyre PT   [facility] Physical Therapy ([facility]  )    [address] [place] [id number] [phone]              Who to contact     If you have questions or need follow up information about today's clinic visit or your schedule please contact [facility] PHYSICAL THERAPY directly at [phone].  Normal or non-critical lab and imaging results will be communicated to you by MyChart, letter or phone within 4 business days after the clinic has received the results. If you do not hear from us within 7 days, please contact the clinic through LendFriendhart or phone. If you have a critical or abnormal lab result, we will notify you by phone as soon as possible.  Submit refill requests through Influ[employer] or call your pharmacy and they will forward the refill request to us. Please allow 3 business days for your refill to be completed.          Additional Information About Your Visit        MyChart Information     [employer] gives you secure access to your electronic health record. If you see a primary care provider, you can also send messages to your care team and make appointments. If you have  questions, please call your primary care clinic.  If you do not have a primary care provider, please call [phone] and they will assist you.        Care EveryWhere ID     This is your Care EveryWhere ID. This could be used by other organizations to access your [place] medical records  [id number]         Blood Pressure from Last 3 Encounters:   06/07/17 107/70   04/06/17 120/74   11/17/16 103/69    Weight from Last 3 Encounters:   06/07/17 93 kg (205 lb)   04/19/17 95.3 kg (210 lb)   04/06/17 95.3 kg (210 lb)              We Performed the Following     JENNIFER Progress Notes Report     Neuromuscular Re-Education     Therapeutic Activities     Therapeutic Exercises          Today's Medication Changes          These changes are accurate as of: 9/15/17  6:34 PM.  If you have any questions, ask your nurse or doctor.               These medicines have changed or have updated prescriptions.        Dose/Directions    triamcinolone 0.1 % ointment   Commonly known as:  KENALOG   This may have changed:    - when to take this  - reasons to take this  - additional instructions   Used for:  Other eczema        Apply sparingly to affected area three times a day   Quantity:  80 g   Refills:  2                Primary Care Provider Office Phone # Fax #    To Whitt [phone]       [address] [facility] [address]        Equal Access to Services     CRUZ RDZ AH: Hadii aad ku hadasho Soacacia, waaxda luqadaha, qaybta kaalmada adeegyada, dixie hurtado . So [facility] [phone].    ATENCIÓN: Si habla español, tiene a martin disposición servicios gratuitos de asistencia lingüística. Llame al [phone].    We comply with applicable federal civil rights laws and [place] laws. We do not discriminate on the basis of race, color, national origin, age, disability sex, sexual orientation or gender identity.            Thank you!     Thank you for choosing [facility] PHYSICAL THER[facility]  for your care. Our goal is always to provide you with excellent care. Hearing back from our patients is one way we can continue to improve our services. Please take a few minutes to complete the written survey that you may receive in the mail after your visit with us. Thank you!             Your Updated Medication List - Protect others around you: Learn how to safely use, store and throw away your medicines at www.disposemymeds.org.          This list is accurate as of: 9/15/17  6:34 PM.  Always use your most recent med list.                   Brand Name Dispense Instructions for use Diagnosis    acetaminophen 325 MG tablet    TYLENOL    100 tablet    Take 2 tablets (650 mg) by mouth every 6 hours as needed for mild pain    Donor of kidney for transplant       IRON SUPPLEMENT PO      Take 325 mg by mouth daily        triamcinolone 0.1 % ointment    KENALOG    80 g    Apply sparingly to affected area three times a day    Other eczema       VITAMIN D (CHOLECALCIFEROL) PO      Take 1,000 Units by mouth daily

## 2017-10-12 DIAGNOSIS — Z52.4 KIDNEY DONOR: Primary | ICD-10-CM

## 2017-10-18 ENCOUNTER — OFFICE VISIT (OUTPATIENT)
Dept: OTOLARYNGOLOGY | Facility: CLINIC | Age: 47
End: 2017-10-18

## 2017-10-18 DIAGNOSIS — H90.2 CONDUCTIVE HEARING LOSS, UNILATERAL: ICD-10-CM

## 2017-10-18 DIAGNOSIS — Z52.4 KIDNEY DONOR: ICD-10-CM

## 2017-10-18 DIAGNOSIS — H69.92 EUSTACHIAN TUBE DISORDER, LEFT: Primary | ICD-10-CM

## 2017-10-18 LAB
ALBUMIN UR-MCNC: NEGATIVE MG/DL
APPEARANCE UR: CLEAR
BILIRUB UR QL STRIP: NEGATIVE
COLOR UR AUTO: ABNORMAL
CREAT SERPL-MCNC: 1.44 MG/DL (ref 0.66–1.25)
CREAT UR-MCNC: 43 MG/DL
GFR SERPL CREATININE-BSD FRML MDRD: 52 ML/MIN/1.7M2
GLUCOSE UR STRIP-MCNC: NEGATIVE MG/DL
HGB UR QL STRIP: NEGATIVE
KETONES UR STRIP-MCNC: NEGATIVE MG/DL
LEUKOCYTE ESTERASE UR QL STRIP: NEGATIVE
MICROALBUMIN UR-MCNC: <5 MG/L
MICROALBUMIN/CREAT UR: NORMAL MG/G CR (ref 0–17)
MUCOUS THREADS #/AREA URNS LPF: PRESENT /LPF
NITRATE UR QL: NEGATIVE
PH UR STRIP: 6 PH (ref 5–7)
PROT UR-MCNC: <0.05 G/L
PROT/CREAT 24H UR: NORMAL G/G CR (ref 0–0.2)
RBC #/AREA URNS AUTO: 0 /HPF (ref 0–2)
SOURCE: ABNORMAL
SP GR UR STRIP: 1 (ref 1–1.03)
UROBILINOGEN UR STRIP-MCNC: 0 MG/DL (ref 0–2)
WBC #/AREA URNS AUTO: 0 /HPF (ref 0–2)

## 2017-10-18 ASSESSMENT — PAIN SCALES - GENERAL: PAINLEVEL: MILD PAIN (3)

## 2017-10-18 NOTE — LETTER
10/18/2017      RE: Suhail Mustafa  [address] [place] [address]       Suhail Mustafa is seen for a left ear check.  He said he got a flu shot 2 weeks ago and after that his ear started to bother him a little bit so he's been using the Vosol drops every 3-4 days.  He says he can feel it go into his ear but can't taste it.  He uses the drops periodically when his hearing drops a little too as he suspects that his tube might have plugged up during those times--he knows when he has to adjust his hearing aid up.    Physical examination:  male in no acute distress.  Alert and answering questions appropriately.  HB 1/6 bilaterally.  Left ear examined under the microscope.  Ear canal moist but no otorrhea, cartilage backing in position, slight retraction posterior superior between the ear canal and cartilage with no debris, PE tube in the inferior quadrant wedged against the cartilage, some crusting around the tube which was suctioned.    Assessment and plan:  Stable appearance of the ear.  He was having a little trouble with his ear the last time he was seen as well but the exam looked stable as it does today.  He was pleased that nothing abnormal was going on and we'll continue seeing him on a 6 month basis.    Yaneth Rogel MD

## 2017-10-18 NOTE — PROGRESS NOTES
Suhail Mustafa is seen for a left ear check.  He said he got a flu shot 2 weeks ago and after that his ear started to bother him a little bit so he's been using the Vosol drops every 3-4 days.  He says he can feel it go into his ear but can't taste it.  He uses the drops periodically when his hearing drops a little too as he suspects that his tube might have plugged up during those times--he knows when he has to adjust his hearing aid up.    Physical examination:  male in no acute distress.  Alert and answering questions appropriately.  HB 1/6 bilaterally.  Left ear examined under the microscope.  Ear canal moist but no otorrhea, cartilage backing in position, slight retraction posterior superior between the ear canal and cartilage with no debris, PE tube in the inferior quadrant wedged against the cartilage, some crusting around the tube which was suctioned.    Assessment and plan:  Stable appearance of the ear.  He was having a little trouble with his ear the last time he was seen as well but the exam looked stable as it does today.  He was pleased that nothing abnormal was going on and we'll continue seeing him on a 6 month basis.

## 2017-10-18 NOTE — PATIENT INSTRUCTIONS
You will need  to schedule a follow up appointment in 6 months for an ear check.   Please call our clinic for any questions,concerns,or worsening symptoms.      Clinic #[phone]       Option 3  for the triage nurse.

## 2017-10-18 NOTE — MR AVS SNAPSHOT
After Visit Summary   10/18/2017    Suhail Mustafa    MRN: [id number]           Patient Information     Date Of Birth          1970        Visit Information        Provider Department      10/18/2017 7:00 AM Yaneth Rogel MD [facility] Ear Nose and Throat        Today's Diagnoses     Eustachian tube disorder, left    -  1    Conductive hearing loss, unilateral          Care Instructions    You will need  to schedule a follow up appointment in 6 months for an ear check.   Please call our clinic for any questions,concerns,or worsening symptoms.      Clinic #[phone]       Option 3  for the triage nurse.          Follow-ups after your visit        Follow-up notes from your care team     Return in about 6 months (around 4/18/2018).      Your next 10 appointments already scheduled     Oct 18, 2017  7:30 AM CDT   LAB with [facility])    [address] [place] [address] [phone]           Patient must bring picture ID. Patient should be prepared to give a urine specimen  Please do not eat 10-12 hours before your appointment if you are coming in fasting for labs on lipids, cholesterol, or glucose (sugar). Pregnant women should follow their Care Team instructions. Water with medications is okay. Do not drink coffee or other fluids. If you have concerns about taking  your medications, please ask at office or if scheduling via MediaPasshart, send a message by clicking on Secure Messaging, Message Your Care Team.            Oct 20, 2017  4:40 PM CDT   JENNIFER Extremity with Gaby Mcintyre, PT   [facility] Physical Therapy ([facility]  )    [address] [place] [address] [phone]            Apr 18, 2018  7:00 AM CDT   (Arrive by 6:45 AM)   Return Visit with MD DHIRAJ [facility] Ear Nose and Throat ([facility])    [address] [place] [address]  [facility] [address] [phone]              Who to contact     Please call your clinic at [phone] to:    Ask questions about your health    Make or cancel appointments    Discuss your medicines    Learn about your test results    Speak to your doctor   If you have compliments or concerns about an experience at your clinic, or if you wish to file a complaint, please contact [facility] Patient Relations at [phone] or email us at [email or web address]         Additional Information About Your Visit        Compass-EOShart Information     [employer] gives you secure access to your electronic health record. If you see a primary care provider, you can also send messages to your care team and make appointments. If you have questions, please call your primary care clinic.  If you do not have a primary care provider, please call [phone] and they will assist you.      [employer] is an electronic gateway that provides easy, online access to your medical records. With [employer], you can request a clinic appointment, read your test results, renew a prescription or communicate with your care team.     To access your existing account, please contact your [facility] Clinic or call [phone] for assistance.        Care EveryWhere ID     This is your Care EveryWhere ID. This could be used by other organizations to access your [place] medical records  [id number]         Blood Pressure from Last 3 Encounters:   06/07/17 107/70   04/06/17 120/74   11/17/16 103/69    Weight from Last 3 Encounters:   06/07/17 93 kg (205 lb)   04/19/17 95.3 kg (210 lb)   04/06/17 95.3 kg (210 lb)              We Performed the Following     BINOCULAR MICROSCOPY        Primary Care Provider Office Phone # Fax #    To Whitt [phone]       [address] [facility] [address]        Equal Access to Services     ALEXANDER MENSAH: Ofe hayes  bisi Ford, rina farfankyaraha, qaguanakitota kajalil juarez, dixie leonardin hayaan claudiaelisa abramjazz lasunshinegissel margarito. So [facility] [phone].    ATENCIÓN: Si habla yuliana, tiene a martin disposición servicios gratuitos de asistencia lingüística. Damon al [phone].    We comply with applicable federal civil rights laws and [place] laws. We do not discriminate on the basis of race, color, national origin, age, disability, sex, sexual orientation, or gender identity.            Thank you!     Thank you for choosing [facility] EAR NOSE AND THROAT  for your care. Our goal is always to provide you with excellent care. Hearing back from our patients is one way we can continue to improve our services. Please take a few minutes to complete the written survey that you may receive in the mail after your visit with us. Thank you!             Your Updated Medication List - Protect others around you: Learn how to safely use, store and throw away your medicines at www.disposemymeds.org.          This list is accurate as of: 10/18/17  7:20 AM.  Always use your most recent med list.                   Brand Name Dispense Instructions for use Diagnosis    acetaminophen 325 MG tablet    TYLENOL    100 tablet    Take 2 tablets (650 mg) by mouth every 6 hours as needed for mild pain    Donor of kidney for transplant       IRON SUPPLEMENT PO      Take 325 mg by mouth daily        triamcinolone 0.1 % ointment    KENALOG    80 g    Apply sparingly to affected area three times a day    Other eczema       VITAMIN D (CHOLECALCIFEROL) PO      Take 1,000 Units by mouth daily

## 2017-10-20 ENCOUNTER — THERAPY VISIT (OUTPATIENT)
Dept: PHYSICAL THERAPY | Facility: CLINIC | Age: 47
End: 2017-10-20
Payer: COMMERCIAL

## 2017-10-20 DIAGNOSIS — M25.512 ACUTE PAIN OF LEFT SHOULDER: ICD-10-CM

## 2017-10-20 PROCEDURE — 97112 NEUROMUSCULAR REEDUCATION: CPT | Mod: GP | Performed by: PHYSICAL THERAPIST

## 2017-10-20 PROCEDURE — 97110 THERAPEUTIC EXERCISES: CPT | Mod: GP | Performed by: PHYSICAL THERAPIST

## 2017-10-20 NOTE — MR AVS SNAPSHOT
After Visit Summary   10/20/2017    Suhail Mustafa    MRN: [id number]           Patient Information     Date Of Birth          1970        Visit Information        Provider Department      10/20/2017 4:40 PM Gaby Mcintyre, PT [facility] Physical [facility]        Today's Diagnoses     Acute pain of left shoulder           Follow-ups after your visit        Your next 10 appointments already scheduled     Dec 15, 2017  5:20 PM CST   JENNIFER Extremity with Gaby Mcintyre PT   [facility] ([facility]  )    [address] [place] [address] [id number] [phone]            Apr 18, 2018  7:00 AM CDT   (Arrive by 6:45 AM)   Return Visit with Yaneth Rogel MD   [facility] ([facility])    [address] [place] [id number] [phone]              Who to contact     If you have questions or need follow up information about today's clinic visit or your schedule please contact [facility] PHYSICAL THERAPY directly at [phone].  Normal or non-critical lab and imaging results will be communicated to you by MyChart, letter or phone within 4 business days after the clinic has received the results. If you do not hear from us within 7 days, please contact the clinic through Epic Scienceshart or phone. If you have a critical or abnormal lab result, we will notify you by phone as soon as possible.  Submit refill requests through Reelation or call your pharmacy and they will forward the refill request to us. Please allow 3 business days for your refill to be completed.          Additional Information About Your Visit        MyChart Information     Reelation gives you secure access to your electronic health record. If you see a primary care provider, you can also send messages to your care team and make appointments. If you have  questions, please call your primary care clinic.  If you do not have a primary care provider, please call [phone] and they will assist you.        Care EveryWhere ID     This is your Care EveryWhere ID. This could be used by other organizations to access your [place] medical records  [id number]         Blood Pressure from Last 3 Encounters:   06/07/17 107/70   04/06/17 120/74   11/17/16 103/69    Weight from Last 3 Encounters:   06/07/17 93 kg (205 lb)   04/19/17 95.3 kg (210 lb)   04/06/17 95.3 kg (210 lb)              We Performed the Following     JENNIFER Progress Notes Report     Neuromuscular Re-Education     Therapeutic Exercises        Primary Care Provider Office Phone # Fax #    To Whitt [phone]       [address] [facility] [address]        Equal Access to Services     [facility]: Hadii aad ku hadasho Soomaali, waaxda luqadaha, qaybta kaalmada adeegyada, waxay leonardin hayaan adeelisa hurtado . So [facility] [phone].    ATENCIÓN: Si habla español, tiene a martin disposición servicios gratuitos de asistencia lingüística. Damon al [phone].    We comply with applicable federal civil rights laws and [place] laws. We do not discriminate on the basis of race, color, national origin, age, disability, sex, sexual orientation, or gender identity.            Thank you!     Thank you for choosing [facility] PH[facility] THER[facility]  for your care. Our goal is always to provide you with excellent care. Hearing back from our patients is one way we can continue to improve our services. Please take a few minutes to complete the written survey that you may receive in the mail after your visit with us. Thank you!             Your Updated Medication List - Protect others around you: Learn how to safely use, store and throw away your medicines at www.disposemymeds.org.          This list is accurate as of: 10/20/17  5:13 PM.  Always use your most  recent med list.                   Brand Name Dispense Instructions for use Diagnosis    acetaminophen 325 MG tablet    TYLENOL    100 tablet    Take 2 tablets (650 mg) by mouth every 6 hours as needed for mild pain    Donor of kidney for transplant       IRON SUPPLEMENT PO      Take 325 mg by mouth daily        triamcinolone 0.1 % ointment    KENALOG    80 g    Apply sparingly to affected area three times a day    Other eczema       VITAMIN D (CHOLECALCIFEROL) PO      Take 1,000 Units by mouth daily

## 2017-10-20 NOTE — PROGRESS NOTES
Subjective:    HPI                    Objective:    System    Physical Exam    General     ROS    Assessment/Plan:      PROGRESS  REPORT    Progress reporting period is from 9-15-17 to 10-20-17.       SUBJECTIVE  Subjective changes noted by patient:   Pt. has returned to the gym this past week without difficulty. He had no pain with the exercises and weights he did. He still is holding off on pushing exercises such as the bench press. Pt. has not golfed since the last visit but had golfed twice prior to last visit without much problem. Pt. has made alot of progress since onset but the progress has slowed in the past 2 months. he will continue his HEP and gym workouts for 2 months then recheck in PT.     Current pain level is  2/10.     Previous pain level was  6/10.   Changes in function:  Yes (See Goal flowsheet attached for changes in current functional level)  Adverse reaction to treatment or activity: None    OBJECTIVE  Changes noted in objective findings:  AROM L sh IR 70; post capsule 64; ER 79. Strength L sh flex 4+/5; abd 4+/5; ER 5/5; hor abd 4/5; ext 5/5. Tests - (+) Labrum     ASSESSMENT/PLAN  Updated problem list and treatment plan: Diagnosis 1:  L shoulder pain  Pain -  self management and education  Decreased ROM/flexibility - manual therapy and therapeutic exercise  Decreased strength - therapeutic exercise and therapeutic activities  Impaired muscle performance - neuro re-education  Decreased function - therapeutic activities  STG/LTGs have been met or progress has been made towards goals:  Yes (See Goal flow sheet completed today.)  Assessment of Progress: The patient's condition is improving.  Self Management Plans:  Patient has been instructed in a home treatment program.  Patient  has been instructed in self management of symptoms.  I have re-evaluated this patient and find that the nature, scope, duration and intensity of the therapy is appropriate for the medical condition of the patient.  Suhail  continues to require the following intervention to meet STG and LTG's:  PT    Recommendations:  This patient would benefit from continued therapy.     Frequency:  1 X a month, once daily  Duration:  for 1 month          Please refer to the daily flowsheet for treatment today, total treatment time and time spent performing 1:1 timed codes.

## 2018-04-24 ENCOUNTER — OFFICE VISIT (OUTPATIENT)
Dept: FAMILY MEDICINE | Facility: CLINIC | Age: 48
End: 2018-04-24
Payer: COMMERCIAL

## 2018-04-24 VITALS
OXYGEN SATURATION: 100 % | WEIGHT: 209 LBS | HEART RATE: 68 BPM | DIASTOLIC BLOOD PRESSURE: 71 MMHG | HEIGHT: 71 IN | BODY MASS INDEX: 29.26 KG/M2 | SYSTOLIC BLOOD PRESSURE: 109 MMHG | TEMPERATURE: 97.6 F

## 2018-04-24 DIAGNOSIS — Z00.00 ROUTINE GENERAL MEDICAL EXAMINATION AT A HEALTH CARE FACILITY: Primary | ICD-10-CM

## 2018-04-24 DIAGNOSIS — Z12.11 COLON CANCER SCREENING: ICD-10-CM

## 2018-04-24 DIAGNOSIS — Z52.4 DONOR OF KIDNEY FOR TRANSPLANT: ICD-10-CM

## 2018-04-24 DIAGNOSIS — H90.12 CONDUCTIVE HEARING LOSS OF LEFT EAR WITH UNRESTRICTED HEARING OF RIGHT EAR: ICD-10-CM

## 2018-04-24 DIAGNOSIS — E66.3 OVERWEIGHT (BMI 25.0-29.9): ICD-10-CM

## 2018-04-24 DIAGNOSIS — E78.5 HYPERLIPIDEMIA LDL GOAL <100: ICD-10-CM

## 2018-04-24 DIAGNOSIS — R79.89 ELEVATED SERUM CREATININE: ICD-10-CM

## 2018-04-24 LAB
ALT SERPL W P-5'-P-CCNC: 25 U/L (ref 0–70)
ANION GAP SERPL CALCULATED.3IONS-SCNC: 5 MMOL/L (ref 3–14)
AST SERPL W P-5'-P-CCNC: 24 U/L (ref 0–45)
BUN SERPL-MCNC: 16 MG/DL (ref 7–30)
CALCIUM SERPL-MCNC: 8.5 MG/DL (ref 8.5–10.1)
CHLORIDE SERPL-SCNC: 107 MMOL/L (ref 94–109)
CHOLEST SERPL-MCNC: 174 MG/DL
CO2 SERPL-SCNC: 28 MMOL/L (ref 20–32)
CREAT SERPL-MCNC: 1.51 MG/DL (ref 0.66–1.25)
ERYTHROCYTE [DISTWIDTH] IN BLOOD BY AUTOMATED COUNT: 11.9 % (ref 10–15)
GFR SERPL CREATININE-BSD FRML MDRD: 49 ML/MIN/1.7M2
GLUCOSE SERPL-MCNC: 99 MG/DL (ref 70–99)
HCT VFR BLD AUTO: 41.2 % (ref 40–53)
HDLC SERPL-MCNC: 39 MG/DL
HGB BLD-MCNC: 14.2 G/DL (ref 13.3–17.7)
LDLC SERPL CALC-MCNC: 110 MG/DL
MCH RBC QN AUTO: 31.8 PG (ref 26.5–33)
MCHC RBC AUTO-ENTMCNC: 34.5 G/DL (ref 31.5–36.5)
MCV RBC AUTO: 92 FL (ref 78–100)
NONHDLC SERPL-MCNC: 135 MG/DL
PLATELET # BLD AUTO: 191 10E9/L (ref 150–450)
POTASSIUM SERPL-SCNC: 4.1 MMOL/L (ref 3.4–5.3)
PSA SERPL-ACNC: 1.76 UG/L (ref 0–4)
RBC # BLD AUTO: 4.47 10E12/L (ref 4.4–5.9)
SODIUM SERPL-SCNC: 140 MMOL/L (ref 133–144)
TRIGL SERPL-MCNC: 123 MG/DL
WBC # BLD AUTO: 4.1 10E9/L (ref 4–11)

## 2018-04-24 PROCEDURE — 84450 TRANSFERASE (AST) (SGOT): CPT | Performed by: FAMILY MEDICINE

## 2018-04-24 PROCEDURE — 80061 LIPID PANEL: CPT | Performed by: FAMILY MEDICINE

## 2018-04-24 PROCEDURE — 85027 COMPLETE CBC AUTOMATED: CPT | Performed by: FAMILY MEDICINE

## 2018-04-24 PROCEDURE — G0103 PSA SCREENING: HCPCS | Performed by: FAMILY MEDICINE

## 2018-04-24 PROCEDURE — 80048 BASIC METABOLIC PNL TOTAL CA: CPT | Performed by: FAMILY MEDICINE

## 2018-04-24 PROCEDURE — 99396 PREV VISIT EST AGE 40-64: CPT | Performed by: FAMILY MEDICINE

## 2018-04-24 PROCEDURE — 84460 ALANINE AMINO (ALT) (SGPT): CPT | Performed by: FAMILY MEDICINE

## 2018-04-24 PROCEDURE — 36415 COLL VENOUS BLD VENIPUNCTURE: CPT | Performed by: FAMILY MEDICINE

## 2018-04-24 RX ORDER — ACETIC ACID 20.65 MG/ML
SOLUTION AURICULAR (OTIC)
Refills: 11 | COMMUNITY
Start: 2018-03-31 | End: 2021-01-21

## 2018-04-24 NOTE — PROGRESS NOTES
SUBJECTIVE:   CC: Suhail Mustafa is an 47 year old male who presents for a preventative health visit.     Physical   Annual:     Getting at least 3 servings of Calcium per day::  Yes    Bi-annual eye exam::  Yes    Dental care twice a year::  Yes    Sleep apnea or symptoms of sleep apnea::  None    Diet::  Gluten-free/reduced    Frequency of exercise::  2-3 days/week    Duration of exercise::  30-45 minutes    Taking medications regularly::  Yes    Medication side effects::  Not applicable    Additional concerns today::  YES            Other concerns:     Check left ear to see if the tube is still there.  Kidney function test.    He has had multiple surgeries on his left ear including PE tube placements multiple times.  He has hearing loss in the left ear and wears a hearing aid.    He donated his left kidney to his brother a year and a half ago.  His creatinine then was around 1.5.  He like to have that checked again.    Today's PHQ-2 Score:   PHQ-2 ( 1999 [employer]) 4/24/2018   Q1: Little interest or pleasure in doing things 0   Q2: Feeling down, depressed or hopeless 0   PHQ-2 Score 0   Q1: Little interest or pleasure in doing things Not at all   Q2: Feeling down, depressed or hopeless Not at all   PHQ-2 Score 0       Abuse: Current or Past(Physical, Sexual or Emotional)- No  Do you feel safe in your environment - Yes    Social History   Substance Use Topics     Smoking status: Never Smoker     Smokeless tobacco: Never Used     Alcohol use 0.6 oz/week     1 Standard drinks or equivalent per week      Comment: once per week with a meal     Alcohol Use 4/24/2018   If you drink alcohol do you typically have greater than 3 drinks per day OR greater than 7 drinks per week? No       Last PSA: No results found for: PSA    Reviewed orders with patient. Reviewed health maintenance and updated orders accordingly - Yes  Patient Active Problem List   Diagnosis     FH: kidney cancer     Conductive hearing loss in left ear      Donor of kidney for transplant     Lichen simplex chronicus     ACP (advance care planning)     Acute pain of left shoulder     Hyperlipidemia LDL goal <100     Overweight (BMI 25.0-29.9)     Past Surgical History:   Procedure Laterality Date     ENT SURGERY  multiple times    pe tubes     ENT SURGERY      tonsils     ENT SURGERY  11/2011    Tubes 5 times since last time 10/21/14     GENITOURINARY SURGERY  Oct. 2013    Vasectomy     LAPAROSCOPIC DONOR HAND ASSISTED KIDNEY LIVING RELATED N/A 10/13/2016    Procedure: LAPAROSCOPIC DONOR HAND ASSISTED KIDNEY LIVING RELATED;  Surgeon: Tigre Quarles MD;  Location: [facility] OR     LASER DIODE TYMPANOMASTOIDECTOMY Left 3/31/2015    Procedure: LASER DIODE TYMPANOMASTOIDECTOMY;  Surgeon: Yaneth Rogel MD;  Location: [facility] OR     ORTHOPEDIC SURGERY  child    dislocated hip     OSSICULAR CHAIN RECONSTRUCTION N/A 10/9/2015    Procedure: OSSICULAR CHAIN RECONSTRUCTION;  Surgeon: Yaneth Rogel MD;  Location: [facility] OR     TYMPANOPLASTY Left 10/9/2015    Procedure: TYMPANOPLASTY;  Surgeon: Yaneth Rogel MD;  Location: [facility] OR       Social History   Substance Use Topics     Smoking status: Never Smoker     Smokeless tobacco: Never Used     Alcohol use 0.6 oz/week     1 Standard drinks or equivalent per week      Comment: once per week with a meal     Family History   Problem Relation Age of Onset     GASTROINTESTINAL DISEASE Mother      colostomy for diverticulitis     CEREBROVASCULAR DISEASE Mother      after giving birth     KIDNEY DISEASE Brother      dysplasia     Genitourinary Problems Brother      Cardiovascular Brother      ablation     Hypertension Brother      HEART DISEASE Father      Genitourinary Problems Father      Type 2 Diabetes Father      obese     CANCER Paternal Grandfather      bone cancer     Aneurysm Paternal Uncle      brain aneurysm on coumadin     Hypertension Brother          Current Outpatient Prescriptions   Medication Sig Dispense Refill     acetaminophen  "(TYLENOL) 325 MG tablet Take 2 tablets (650 mg) by mouth every 6 hours as needed for mild pain 100 tablet 0     acetic acid (VOSOL) 2 % otic solution INSTILL 4 DROPS TO THE LEFT EAR- DAILY OR EVERY OTHER DAY  11     triamcinolone (KENALOG) 0.1 % ointment Apply sparingly to affected area three times a day 80 g 2     VITAMIN D, CHOLECALCIFEROL, PO Take 1,000 Units by mouth daily        No Known Allergies    Reviewed and updated as needed this visit by clinical staff  Tobacco  Allergies  Meds  Med Hx  Surg Hx  Fam Hx  Soc Hx        Reviewed and updated as needed this visit by Provider            Review of Systems  CONSTITUTIONAL: Some mild weight gain in the last year or 2  I: NEGATIVE for worrisome rashes, moles or lesions  E: NEGATIVE for vision changes or irritation  ENT: See above  R: NEGATIVE for significant cough or SOB  CV: NEGATIVE for chest pain, palpitations or peripheral edema  GI: NEGATIVE for nausea, abdominal pain, heartburn, or change in bowel habits   male: negative for dysuria, hematuria, decreased urinary stream, erectile dysfunction, urethral discharge  M: NEGATIVE for significant arthralgias or myalgia; he was having some left shoulder pain last year, but that is better now  N: NEGATIVE for weakness, dizziness or paresthesias  P: NEGATIVE for changes in mood or affect    OBJECTIVE:   /71 (BP Location: Right arm, Patient Position: Chair, Cuff Size: Adult Regular)  Pulse 68  Temp 97.6  F (36.4  C) (Oral)  Ht 5' 10.75\" (1.797 m)  Wt 209 lb (94.8 kg)  SpO2 100%  BMI 29.36 kg/m2    Physical Exam  GENERAL: alert, no distress and over weight  EYES: Eyes grossly normal to inspection, PERRL and conjunctivae and sclerae normal  HENT: right ear canal and TM normal, left ear canal is clear with a PE tube in the inferior portion of the TM, nose and mouth without ulcers or lesions  NECK: no adenopathy, no asymmetry, masses, or scars and thyroid normal to palpation  RESP: lungs clear to " "auscultation - no rales, rhonchi or wheezes  CV: regular rate and rhythm, normal S1 S2, no S3 or S4, no murmur, click or rub, no peripheral edema and peripheral pulses strong  ABDOMEN: soft, nontender, no hepatosplenomegaly, no masses; he has a well-healed vertical scar in the upper abdomen from his kidney donation procedure a year and a half ago  MS: no gross musculoskeletal defects noted, no edema  SKIN: no suspicious lesions or rashes  NEURO: Normal strength and tone, mentation intact and speech normal  PSYCH: mentation appears normal, affect normal/bright    ASSESSMENT/PLAN:       ICD-10-CM    1. Routine general medical examination at a health care facility Z00.00 Basic metabolic panel     Lipid panel reflex to direct LDL Fasting     ALT     AST     CBC with platelets     Prostate spec antigen screen   2. Conductive hearing loss of left ear with unrestricted hearing of right ear H90.12    3. Donor of kidney for transplant Z52.4    4. Elevated serum creatinine R79.89    5. Hyperlipidemia LDL goal <100 E78.5    6. Overweight (BMI 25.0-29.9) E66.3    7. Colon cancer screening Z12.11 GASTROENTEROLOGY ADULT REF PROCEDURE ONLY Other; [facility] [phone]     Blood pressure and other vital signs look good  We will check fasting labs today as above  If his creatinine is still elevated and lipids are abnormal, then we may want to start him on a statin  He will be turning 48 years of age in a few months we will refer him for a screening colonoscopy  He has a biometric health screening form that we will complete for him  Plan a tentative recheck in 1 year, or sooner prn    COUNSELING:   Reviewed preventive health counseling, as reflected in patient instructions       Regular exercise       Healthy diet/nutrition         reports that he has never smoked. He has never used smokeless tobacco.    Estimated body mass index is 29.36 kg/(m^2) as calculated from the following:    Height as of this encounter: 5' 10.75\" (1.797 m).   "  Weight as of this encounter: 209 lb (94.8 kg).   Weight management plan: Discussed healthy diet and exercise guidelines and patient will follow up in 12 months in clinic to re-evaluate.    Counseling Resources:  ATP IV Guidelines  Pooled Cohorts Equation Calculator  FRAX Risk Assessment  ICSI Preventive Guidelines  Dietary Guidelines for Americans, 2010  USDA's MyPlate  ASA Prophylaxis  Lung CA Screening    To Whitt MD  [facility]      Answers for HPI/ROS submitted by the patient on 4/24/2018   PHQ-2 Score: 0

## 2018-04-24 NOTE — NURSING NOTE
"Chief Complaint   Patient presents with     Physical       Initial /71 (BP Location: Right arm, Patient Position: Chair, Cuff Size: Adult Regular)  Pulse 68  Temp 97.6  F (36.4  C) (Oral)  Ht 5' 10.75\" (1.797 m)  Wt 209 lb (94.8 kg)  SpO2 100%  BMI 29.36 kg/m2 Estimated body mass index is 29.36 kg/(m^2) as calculated from the following:    Height as of this encounter: 5' 10.75\" (1.797 m).    Weight as of this encounter: 209 lb (94.8 kg).  Medication Reconciliation: complete   Gaby Segura CMA       "

## 2018-04-24 NOTE — MR AVS SNAPSHOT
After Visit Summary   4/24/2018    Suhail Mustafa    MRN: [id number]           Patient Information     Date Of Birth          1970        Visit Information        Provider Department      4/24/2018 7:00 AM To Whitt MD [facility]        Today's Diagnoses     Routine general medical examination at a health care facility    -  1    Conductive hearing loss of left ear with unrestricted hearing of right ear        Donor of kidney for transplant        Colon cancer screening          Care Instructions      Preventive Health Recommendations  Male Ages 40 to 49    Yearly exam:             See your health care provider every year in order to  o   Review health changes.   o   Discuss preventive care.    o   Review your medicines if your doctor has prescribed any.    You should be tested each year for STDs (sexually transmitted diseases) if you re at risk.     Have a cholesterol test every 5 years.     Have a colonoscopy (test for colon cancer) if someone in your family has had colon cancer or polyps before age 50.     After age 45, have a diabetes test (fasting glucose). If you are at risk for diabetes, you should have this test every 3 years.      Talk with your health care provider about whether or not a prostate cancer screening test (PSA) is right for you.    Shots: Get a flu shot each year. Get a tetanus shot every 10 years.     Nutrition:    Eat at least 5 servings of fruits and vegetables daily.     Eat whole-grain bread, whole-wheat pasta and brown rice instead of white grains and rice.     Talk to your provider about Calcium and Vitamin D.     Lifestyle    Exercise for at least 150 minutes a week (30 minutes a day, 5 days a week). This will help you control your weight and prevent disease.     Limit alcohol to one drink per day.     No smoking.     Wear sunscreen to prevent skin cancer.     See your dentist every six months for an exam and cleaning.               Follow-ups after your visit        Additional Services     GASTROENTEROLOGY ADULT REF PROCEDURE ONLY Other; [facility] [phone]       Last Lab Result: Creatinine (mg/dL)       Date                     Value                 10/18/2017               1.44 (H)         ----------  Body mass index is 29.36 kg/(m^2).     Needed:  No  Language:  English    Patient will be contacted to schedule procedure.     Please be aware that coverage of these services is subject to the terms and limitations of your health insurance plan.  Call member services at your health plan with any benefit or coverage questions.  Any procedures must be performed at a [place] facility OR coordinated by your clinic's referral office.    Please bring the following with you to your appointment:    (1) Any X-Rays, CTs or MRIs which have been performed.  Contact the facility where they were done to arrange for  prior to your scheduled appointment.    (2) List of current medications   (3) This referral request   (4) Any documents/labs given to you for this referral                  Follow-up notes from your care team     Return in about 1 year (around 4/24/2019).      Your next 10 appointments already scheduled     May 16, 2018 10:30 AM CDT   (Arrive by 10:15 AM)   Return Visit with Yaneth Rogel MD   [facility] ([facility] [place])    [address] [place] [id number] [phone]              Who to contact     If you have questions or need follow up information about today's clinic visit or your schedule please contact [facility] directly at [phone].  Normal or non-critical lab and imaging results will be communicated to you by MyChart, letter or phone within 4 business days after the clinic has received the results. If you do not hear from us within 7 days, please contact the clinic through MyChart or phone. If you have a critical or abnormal  "lab result, we will notify you by phone as soon as possible.  Submit refill requests through [email or web address] or call your pharmacy and they will forward the refill request to us. Please allow 3 business days for your refill to be completed.          Additional Information About Your Visit        EVERYWAREhart Information     [email or web address] gives you secure access to your electronic health record. If you see a primary care provider, you can also send messages to your care team and make appointments. If you have questions, please call your primary care clinic.  If you do not have a primary care provider, please call [phone] and they will assist you.        Care EveryWhere ID     This is your Care EveryWhere ID. This could be used by other organizations to access your [place] medical records  [id number]        Your Vitals Were     Pulse Temperature Height Pulse Oximetry BMI (Body Mass Index)       68 97.6  F (36.4  C) (Oral) 5' 10.75\" (1.797 m) 100% 29.36 kg/m2        Blood Pressure from Last 3 Encounters:   04/24/18 109/71   06/07/17 107/70   04/06/17 120/74    Weight from Last 3 Encounters:   04/24/18 209 lb (94.8 kg)   06/07/17 205 lb (93 kg)   04/19/17 210 lb (95.3 kg)              We Performed the Following     ALT     AST     Basic metabolic panel     CBC with platelets     GASTROENTEROLOGY ADULT REF PROCEDURE ONLY Other; [facility] [phone]     Lipid panel reflex to direct LDL Fasting     Prostate spec antigen screen        Primary Care Provider Office Phone # Fax #    To Whitt [phone]       [address] [facility] [address]        Equal Access to Services     [facility]: Hadii aad ku hadasho Soomaali, waaxda luqadaha, qaybta kaalmada larry, dixie pimentel. So [facility] [phone].    ATENCIÓN: Si habla español, tiene a martin disposición servicios gratuitos de asistencia lingüística. Llame al [phone].    We comply with applicable federal civil rights " laws and [place] laws. We do not discriminate on the basis of race, color, national origin, age, disability, sex, sexual orientation, or gender identity.            Thank you!     Thank you for choosing [facility]  for your care. Our goal is always to provide you with excellent care. Hearing back from our patients is one way we can continue to improve our services. Please take a few minutes to complete the written survey that you may receive in the mail after your visit with us. Thank you!             Your Updated Medication List - Protect others around you: Learn how to safely use, store and throw away your medicines at www.disposemymeds.org.          This list is accurate as of 4/24/18  7:28 AM.  Always use your most recent med list.                   Brand Name Dispense Instructions for use Diagnosis    acetaminophen 325 MG tablet    TYLENOL    100 tablet    Take 2 tablets (650 mg) by mouth every 6 hours as needed for mild pain    Donor of kidney for transplant       acetic acid 2 % otic solution    VOSOL     INSTILL 4 DROPS TO THE LEFT EAR- DAILY OR EVERY OTHER DAY        triamcinolone 0.1 % ointment    KENALOG    80 g    Apply sparingly to affected area three times a day    Other eczema       VITAMIN D (CHOLECALCIFEROL) PO      Take 1,000 Units by mouth daily

## 2018-04-25 ENCOUNTER — TELEPHONE (OUTPATIENT)
Dept: FAMILY MEDICINE | Facility: CLINIC | Age: 48
End: 2018-04-25

## 2018-04-25 DIAGNOSIS — E78.5 HYPERLIPIDEMIA LDL GOAL <100: Primary | ICD-10-CM

## 2018-04-25 DIAGNOSIS — N18.30 CKD (CHRONIC KIDNEY DISEASE) STAGE 3, GFR 30-59 ML/MIN (H): ICD-10-CM

## 2018-04-25 PROBLEM — R79.89 ELEVATED SERUM CREATININE: Status: RESOLVED | Noted: 2018-04-24 | Resolved: 2018-04-25

## 2018-04-25 RX ORDER — SIMVASTATIN 20 MG
20 TABLET ORAL AT BEDTIME
Qty: 90 TABLET | Refills: 3 | Status: SHIPPED | OUTPATIENT
Start: 2018-04-25 | End: 2019-05-14

## 2018-04-25 NOTE — PROGRESS NOTES
"Suhail,  Your PSA prostate cancer screening test is normal.  Blood counts are normal.  Liver tests are normal.  Creatinine remains mildly elevated.  Your LDL \"bad\" cholesterol remains slightly elevated and your HDL \"good\" cholesterol slightly low.  Given your kidney situation, I think it would be wise to take a medicine that would lower your bad cholesterol and raise your good cholesterol.  Simvastatin 20 mg each evening would do this in a very effective and predictable fashion.  I sent a prescription for this medication to your [facility] pharmacy within [facility] in [place] for you to take.   I would encourage you to take this medicine daily, preferably each evening before bed.  We will plan to recheck your lab tests in a year from now at your next physical, although we could always check cholesterol values again sooner if you are into the office sometime in the upcoming months for any reason.  Please let me know if you have questions.     To Whitt MD  "

## 2018-05-03 ENCOUNTER — OFFICE VISIT (OUTPATIENT)
Dept: OTOLARYNGOLOGY | Facility: CLINIC | Age: 48
End: 2018-05-03
Payer: COMMERCIAL

## 2018-05-03 VITALS — BODY MASS INDEX: 29.68 KG/M2 | HEIGHT: 71 IN | WEIGHT: 212 LBS

## 2018-05-03 DIAGNOSIS — H65.92 OTITIS MEDIA WITH EFFUSION, LEFT: Primary | ICD-10-CM

## 2018-05-03 ASSESSMENT — PAIN SCALES - GENERAL: PAINLEVEL: MODERATE PAIN (5)

## 2018-05-03 NOTE — PATIENT INSTRUCTIONS
You will need  to schedule a follow up appointment in 6 month.   Please call our clinic for any questions,concerns,or worsening symptoms.      Clinic #[phone]       Option 3  for the triage nurse.

## 2018-05-03 NOTE — Clinical Note
5/3/2018       RE: Suhail Mustafa  [address] [place] [address]     Dear Colleague,    Thank you for referring your patient, Suhail Mustafa, to the [facility] EAR NOSE AND THROAT at [facility]. Please see a copy of my visit note below.    No notes on file    Again, thank you for allowing me to participate in the care of your patient.      Sincerely,    Yaneth Rogel MD

## 2018-05-03 NOTE — LETTER
5/3/2018      RE: Suhail Mustafa  [address]   [facility] [address]       Suhail Mustafa is seen because of left aural fullness and worsening hearing loss with increased tinnitus.  He has noticed these symptoms for a few weeks now.  No otalgia or otorrhea although the ear is uncomfortable due to the increasing fullness.  No URI symptoms and the right ear is fine.    Physical examination:  male in no acute distress.  Alert and answering questions appropriately.  HB 1/6 bilaterally.  Left ear examined under the microscope.  His old Paparella tube has indeed extruded and is sitting in the ear canal.  This was removed and the TM is intact.  It is thick so it is difficult to tell if an effusion is present.  Stable epitympanic retraction.    Procedure:  We discussed placement of a new PE tube as he has demonstrated a need for external aeration.  We placed a Paparella tube at the last visit due to the narrowness of his ear canal medially due to the anterior bony overhang as well as the thickness of the TM.  Risks and benefits were discussed and consent obtained.  Time Out was performed with confirmation of the patient, site and procedure.  Microscope was used to examine the left ear.  TM was anesthetized with phenol anteriorly.  Myringotomy incision made and no effusion was noted but he noted immediate improvement in the fullness.  Paparella tube was placed.  He tolerated the procedure well.      Assessment and plan:  Continued eustachian tube dysfunction.  We'll see him again in 6 months.      Yaneth Rogel MD

## 2018-05-03 NOTE — NURSING NOTE
"Chief Complaint   Patient presents with     RECHECK     PE tube      Height 1.8 m (5' 10.87\"), weight 96.2 kg (212 lb).    Magdi Timmons LPN    "

## 2018-05-03 NOTE — MR AVS SNAPSHOT
After Visit Summary   5/3/2018    Suhail Mustafa    MRN: [id number]           Patient Information     Date Of Birth          1970        Visit Information        Provider Department      5/3/2018 2:45 PM Yaneth Rogel MD M [facility] Ear Nose and Throat        Care Instructions    You will need  to schedule a follow up appointment in 6 month.   Please call our clinic for any questions,concerns,or worsening symptoms.      Clinic #[phone]       Option 3  for the triage nurse.          Follow-ups after your visit        Your next 10 appointments already scheduled     Nov 07, 2018  7:00 AM CST   (Arrive by 6:45 AM)   Return Visit with MD DHIRAJ Larry [facility] Ear Nose and Throat ([facility])    [address] [facility]  [address] [facility] [address] [phone]              Who to contact     Please call your clinic at [phone] to:    Ask questions about your health    Make or cancel appointments    Discuss your medicines    Learn about your test results    Speak to your doctor            Additional Information About Your Visit        [employer] Information     [employer] gives you secure access to your electronic health record. If you see a primary care provider, you can also send messages to your care team and make appointments. If you have questions, please call your primary care clinic.  If you do not have a primary care provider, please call [phone] and they will assist you.      [employer] is an electronic gateway that provides easy, online access to your medical records. With [employer], you can request a clinic appointment, read your test results, renew a prescription or communicate with your care team.     To access your existing account, please contact your [facility] or call [phone] for assistance.        Care EveryWhere ID     This is your Care EveryWhere ID. This could be used by other organizations to access your  "[place] medical records  [id number]        Your Vitals Were     Height BMI (Body Mass Index)                1.8 m (5' 10.87\") 29.68 kg/m2           Blood Pressure from Last 3 Encounters:   04/24/18 109/71   06/07/17 107/70   04/06/17 120/74    Weight from Last 3 Encounters:   05/03/18 96.2 kg (212 lb)   04/24/18 94.8 kg (209 lb)   06/07/17 93 kg (205 lb)              Today, you had the following     No orders found for display       Primary Care Provider Office Phone # Fax #    To Whitt [phone]       [address] [facility] [address]        Equal Access to Services     ALEXANDER RDZ : Hadii alo mckeono Somattali, waaxda luqadaha, qaybta kaalmada adeegyada, dixie hurtado . So [facility] [phone].    ATENCIÓN: Si habla español, tiene a martin disposición servicios gratuitos de asistencia lingüística. [facility] [phone].    We comply with applicable federal civil rights laws and [place] laws. We do not discriminate on the basis of race, color, national origin, age, disability, sex, sexual orientation, or gender identity.            Thank you!     Thank you for choosing [facility] EAR NOSE AND THROAT  for your care. Our goal is always to provide you with excellent care. Hearing back from our patients is one way we can continue to improve our services. Please take a few minutes to complete the written survey that you may receive in the mail after your visit with us. Thank you!             Your Updated Medication List - Protect others around you: Learn how to safely use, store and throw away your medicines at www.disposemymeds.org.          This list is accurate as of 5/3/18  3:40 PM.  Always use your most recent med list.                   Brand Name Dispense Instructions for use Diagnosis    acetaminophen 325 MG tablet    TYLENOL    100 tablet    Take 2 tablets (650 mg) by mouth every 6 hours as needed for mild pain    Donor of kidney for transplant    "    acetic acid 2 % otic solution    VOSOL     INSTILL 4 DROPS TO THE LEFT EAR- DAILY OR EVERY OTHER DAY        simvastatin 20 MG tablet    ZOCOR    90 tablet    Take 1 tablet (20 mg) by mouth At Bedtime    Hyperlipidemia LDL goal <100       triamcinolone 0.1 % ointment    KENALOG    80 g    Apply sparingly to affected area three times a day    Other eczema       VITAMIN D (CHOLECALCIFEROL) PO      Take 1,000 Units by mouth daily

## 2018-05-08 ENCOUNTER — MYC MEDICAL ADVICE (OUTPATIENT)
Dept: FAMILY MEDICINE | Facility: CLINIC | Age: 48
End: 2018-05-08

## 2018-05-08 NOTE — TELEPHONE ENCOUNTER
Patient was seen 4/24/18 by Dr. Whitt for physical.   I don't see weight gain as common side effect of simvastatin.    Patient does have CKD stage 3 on problem list, history of kidney cancer.    Routed message to patient to advise if he is having any swelling or shortness of breath before routing to Dr. Peri David, RN  [facility]

## 2018-05-10 NOTE — PROGRESS NOTES
Suhail Mustafa is seen because of left aural fullness and worsening hearing loss with increased tinnitus.  He has noticed these symptoms for a few weeks now.  No otalgia or otorrhea although the ear is uncomfortable due to the increasing fullness.  No URI symptoms and the right ear is fine.    Physical examination:  male in no acute distress.  Alert and answering questions appropriately.  HB 1/6 bilaterally.  Left ear examined under the microscope.  His old Paparella tube has indeed extruded and is sitting in the ear canal.  This was removed and the TM is intact.  It is thick so it is difficult to tell if an effusion is present.  Stable epitympanic retraction.    Procedure:  We discussed placement of a new PE tube as he has demonstrated a need for external aeration.  We placed a Paparella tube at the last visit due to the narrowness of his ear canal medially due to the anterior bony overhang as well as the thickness of the TM.  Risks and benefits were discussed and consent obtained.  Time Out was performed with confirmation of the patient, site and procedure.  Microscope was used to examine the left ear.  TM was anesthetized with phenol anteriorly.  Myringotomy incision made and no effusion was noted but he noted immediate improvement in the fullness.  Paparella tube was placed.  He tolerated the procedure well.      Assessment and plan:  Continued eustachian tube dysfunction.  We'll see him again in 6 months.

## 2018-07-12 ENCOUNTER — MYC MEDICAL ADVICE (OUTPATIENT)
Dept: FAMILY MEDICINE | Facility: CLINIC | Age: 48
End: 2018-07-12

## 2018-07-12 DIAGNOSIS — M25.519 SHOULDER PAIN, UNSPECIFIED CHRONICITY, UNSPECIFIED LATERALITY: Primary | ICD-10-CM

## 2018-07-24 ENCOUNTER — OFFICE VISIT (OUTPATIENT)
Dept: ORTHOPEDICS | Facility: CLINIC | Age: 48
End: 2018-07-24
Payer: COMMERCIAL

## 2018-07-24 VITALS
SYSTOLIC BLOOD PRESSURE: 131 MMHG | BODY MASS INDEX: 29.4 KG/M2 | RESPIRATION RATE: 13 BRPM | WEIGHT: 210 LBS | DIASTOLIC BLOOD PRESSURE: 82 MMHG | HEIGHT: 71 IN | HEART RATE: 80 BPM

## 2018-07-24 DIAGNOSIS — S43.002A ACQUIRED SUBLUXATION OF LEFT SHOULDER, INITIAL ENCOUNTER: ICD-10-CM

## 2018-07-24 DIAGNOSIS — M25.512 LEFT SHOULDER PAIN, UNSPECIFIED CHRONICITY: Primary | ICD-10-CM

## 2018-07-24 PROCEDURE — 99203 OFFICE O/P NEW LOW 30 MIN: CPT | Performed by: ORTHOPAEDIC SURGERY

## 2018-07-24 NOTE — PATIENT INSTRUCTIONS
- Please call [facility] at [phone] to schedule your MRI.     The [facility] is located at:   [address] [place] [address]    -  Once your MRI is scheduled, make an appointment to discuss the results with Dr. To Callaway.  You can call [phone] to make this appointment, anytime 2-3 days after your MRI scan is performed. Dr. Callaway prefers patients to come in for a follow-up appointment to discuss next steps after the MRI.

## 2018-07-24 NOTE — LETTER
7/24/2018         RE: Suhail Mustafa  [address] [place] [address]        Dear Colleague,    Thank you for referring your patient, Suhail Mustafa, to the [facility]. Please see a copy of my visit note below.    Suhail Mustafa is a 47 year old male who is seen in consultation at the request of Dr. To Whitt  for left shoulder pain.     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 2013    Vasectomy     LAPAROSCOPIC DONOR HAND ASSISTED KIDNEY LIVING RELATED N/A 10/13/2016    Procedure: LAPAROSCOPIC DONOR HAND ASSISTED KIDNEY LIVING RELATED;  Surgeon: Tigre Quarles MD;  Location: [facility] OR     LASER DIODE TYMPANOMASTOIDECTOMY Left 3/31/2015    Procedure: LASER DIODE TYMPANOMASTOIDECTOMY;  Surgeon: Yaneth Rogel MD;  Location: [facility] OR     ORTHOPEDIC SURGERY  child    dislocated hip     OSSICULAR CHAIN RECONSTRUCTION N/A 10/9/2015    Procedure: OSSICULAR CHAIN RECONSTRUCTION;  Surgeon: Yaneth Rogel MD;  Location: [facility] OR     TYMPANOPLASTY Left 10/9/2015    Procedure: TYMPANOPLASTY;  Surgeon: Yaneth Rogel MD;  Location: [facility] OR       Family History   Problem Relation Age of Onset     GASTROINTESTINAL DISEASE Mother      colostomy for diverticulitis     Cerebrovascular Disease Mother      after giving birth     KIDNEY DISEASE Brother      dysplasia     Genitourinary Problems Brother      Cardiovascular Brother      ablation     Hypertension Brother      HEART  DISEASE Father      Genitourinary Problems Father      Type 2 Diabetes Father      obese     Cancer Paternal Grandfather      bone cancer     Aneurysm Paternal Uncle      brain aneurysm on coumadin     Hypertension Brother        Social History     Social History     Marital status:      Spouse name: Kenna     Number of children: 2     Years of education: 16     Occupational History           Social History Main Topics     Smoking status: Never Smoker     Smokeless tobacco: Never Used     Alcohol use 0.6 oz/week     1 Standard drinks or equivalent per week      Comment: once per week with a meal     Drug use: No     Sexual activity: Yes     Partners: Female     Birth control/ protection: None      Comment: Vaectomy 2013     Other Topics Concern     Parent/Sibling W/ Cabg, Mi Or Angioplasty Before 65f 55m? Yes     Brother had procedure to remove an extra lead in the heart     Blood Transfusions No     Seat Belt Yes     Social History Narrative       Current Outpatient Prescriptions   Medication Sig Dispense Refill     acetaminophen (TYLENOL) 325 MG tablet Take 2 tablets (650 mg) by mouth every 6 hours as needed for mild pain 100 tablet 0     acetic acid (VOSOL) 2 % otic solution INSTILL 4 DROPS TO THE LEFT EAR- DAILY OR EVERY OTHER DAY  11     simvastatin (ZOCOR) 20 MG tablet Take 1 tablet (20 mg) by mouth At Bedtime 90 tablet 3     triamcinolone (KENALOG) 0.1 % ointment Apply sparingly to affected area three times a day 80 g 2     VITAMIN D, CHOLECALCIFEROL, PO Take 1,000 Units by mouth daily          No Known Allergies    REVIEW OF SYSTEMS:  CONSTITUTIONAL:  NEGATIVE for fever, chills, change in weight, not feeling tired  SKIN:  NEGATIVE for worrisome rashes, no skin lumps, no skin ulcers and no non-healing wounds  EYES:  NEGATIVE for vision changes or irritation.  ENT/MOUTH:  NEGATIVE.  No hearing loss, no hoarseness, no difficulty swallowing.  RESP:  NEGATIVE. No cough or shortness of  "breath.  CV:  NEGATIVE for chest pain, palpitations or peripheral edema  GI:  NEGATIVE for nausea, abdominal pain, heartburn, or change in bowel habits  :  Negative. No dysuria, no hematuria  MUSCULOSKELETAL:  See HPI above  NEURO:  NEGATIVE . No headaches, no dizziness,  no numbness  ENDOCRINE:  NEGATIVE for temperature intolerance, skin/hair changes  HEME/ALLERGY/IMMUNE:  NEGATIVE for bleeding problems  PSYCHIATRIC:  NEGATIVE. no anxiety, no depression.     Exam:  Vitals: /82  Pulse 80  Resp 13  Ht 1.797 m (5' 10.75\")  Wt 95.3 kg (210 lb)  BMI 29.5 kg/m2  BMI= Body mass index is 29.5 kg/(m^2).  Constitutional:  healthy, alert and no distress  Neuro: Alert and Oriented x 3, Sensation grossly WNL.  Psych: Affect normal   Respiratory: Breathing not labored.  Cardiovascular: normal peripheral pulses  Lymph: no adenopathy  Skin: No rashes,worrisome lesions or skin problems      Again, thank you for allowing me to participate in the care of your patient.        Sincerely,        To Callaway MD    "

## 2018-07-25 NOTE — PROGRESS NOTES
Visit Date:   07/24/2018      DATE OF VISIT: 07/24/2018      SUBJECTIVE:  Mr. Nunez is a 47-year-old male seen in consultation at the request of Dr. To Whitt for evaluation of left shoulder pain.  His shoulder pain started in about 1996 when he dislocated the shoulder playing [employer].  He has had some problems with the shoulder since.  More significant problems now started in 09/2017.  He feels pain at the back of the shoulder with use.  It hurts with golfing, working out and biking.  He has constant pain rated 7/10.  He has had physical therapy which did seem to help strength, but he does not think it helped the pain.  He is not currently doing the strengthening exercises.  He has not had x-ray of the shoulder.  We do see the shoulder on views of the chest and shows no gross abnormalities.      OBJECTIVE:  Examination shows full range of motion of both shoulders.  He does have pain reaching overhead, starting at about 140-150 degrees.  He has posterior pain with anterior apprehension and also has some posterior pain with reduction maneuver on posterior apprehension.  He has no significant pain with resisted internal rotation, external rotation or abduction down at his side.  He does have some pains with [place] test on the left, negative on the right.  No significant pain with resisted abduction at 90 degrees.  Sensation and circulation are intact.  He had negative lift off test.      IMPRESSION:  Left shoulder pain, possible subluxation, possible labrum tear.  He has not responded to strengthening exercises.  We extensively discussed options and have elected to proceed with MRI arthrogram of the shoulder.  If he has a surgically repairable lesion, he is likely to want this arthroscopically repaired and we would refer him for this.         TO GALICIA MD             D: 07/24/2018   T: 07/25/2018   MT:       Name:     SHAR NUNEZ   MRN:      [id number]        Account:      [id number]    :      1970           Visit Date:   2018      Document: [id number]

## 2018-07-25 NOTE — PROGRESS NOTES
Suhail Mustafa is a 47 year old male who is seen in consultation at the request of Dr. To Whitt  for left shoulder pain.     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 2013    Vasectomy     LAPAROSCOPIC DONOR HAND ASSISTED KIDNEY LIVING RELATED N/A 10/13/2016    Procedure: LAPAROSCOPIC DONOR HAND ASSISTED KIDNEY LIVING RELATED;  Surgeon: Tigre Quarles MD;  Location: [facility] OR     LASER DIODE TYMPANOMASTOIDECTOMY Left 3/31/2015    Procedure: LASER DIODE TYMPANOMASTOIDECTOMY;  Surgeon: Yaneth Rogel MD;  Location: [facility] OR     ORTHOPEDIC SURGERY  child    dislocated hip     OSSICULAR CHAIN RECONSTRUCTION N/A 10/9/2015    Procedure: OSSICULAR CHAIN RECONSTRUCTION;  Surgeon: Yaneth Rogel MD;  Location: [facility] OR     TYMPANOPLASTY Left 10/9/2015    Procedure: TYMPANOPLASTY;  Surgeon: Yaneth Rogel MD;  Location: [facility] OR       Family History   Problem Relation Age of Onset     GASTROINTESTINAL DISEASE Mother      colostomy for diverticulitis     Cerebrovascular Disease Mother      after giving birth     KIDNEY DISEASE Brother      dysplasia     Genitourinary Problems Brother      Cardiovascular Brother      ablation     Hypertension Brother      HEART DISEASE Father      Genitourinary Problems Father      Type 2 Diabetes Father      obese     Cancer Paternal Grandfather      bone cancer     Aneurysm Paternal Uncle      brain aneurysm on coumadin     Hypertension Brother        Social History     Social  History     Marital status:      Spouse name: Kenna     Number of children: 2     Years of education: 16     Occupational History           Social History Main Topics     Smoking status: Never Smoker     Smokeless tobacco: Never Used     Alcohol use 0.6 oz/week     1 Standard drinks or equivalent per week      Comment: once per week with a meal     Drug use: No     Sexual activity: Yes     Partners: Female     Birth control/ protection: None      Comment: Vaectomy 2013     Other Topics Concern     Parent/Sibling W/ Cabg, Mi Or Angioplasty Before 65f 55m? Yes     Brother had procedure to remove an extra lead in the heart     Blood Transfusions No     Seat Belt Yes     Social History Narrative       Current Outpatient Prescriptions   Medication Sig Dispense Refill     acetaminophen (TYLENOL) 325 MG tablet Take 2 tablets (650 mg) by mouth every 6 hours as needed for mild pain 100 tablet 0     acetic acid (VOSOL) 2 % otic solution INSTILL 4 DROPS TO THE LEFT EAR- DAILY OR EVERY OTHER DAY  11     simvastatin (ZOCOR) 20 MG tablet Take 1 tablet (20 mg) by mouth At Bedtime 90 tablet 3     triamcinolone (KENALOG) 0.1 % ointment Apply sparingly to affected area three times a day 80 g 2     VITAMIN D, CHOLECALCIFEROL, PO Take 1,000 Units by mouth daily          No Known Allergies    REVIEW OF SYSTEMS:  CONSTITUTIONAL:  NEGATIVE for fever, chills, change in weight, not feeling tired  SKIN:  NEGATIVE for worrisome rashes, no skin lumps, no skin ulcers and no non-healing wounds  EYES:  NEGATIVE for vision changes or irritation.  ENT/MOUTH:  NEGATIVE.  No hearing loss, no hoarseness, no difficulty swallowing.  RESP:  NEGATIVE. No cough or shortness of breath.  CV:  NEGATIVE for chest pain, palpitations or peripheral edema  GI:  NEGATIVE for nausea, abdominal pain, heartburn, or change in bowel habits  :  Negative. No dysuria, no hematuria  MUSCULOSKELETAL:  See HPI above  NEURO:  NEGATIVE . No headaches,  "no dizziness,  no numbness  ENDOCRINE:  NEGATIVE for temperature intolerance, skin/hair changes  HEME/ALLERGY/IMMUNE:  NEGATIVE for bleeding problems  PSYCHIATRIC:  NEGATIVE. no anxiety, no depression.     Exam:  Vitals: /82  Pulse 80  Resp 13  Ht 1.797 m (5' 10.75\")  Wt 95.3 kg (210 lb)  BMI 29.5 kg/m2  BMI= Body mass index is 29.5 kg/(m^2).  Constitutional:  healthy, alert and no distress  Neuro: Alert and Oriented x 3, Sensation grossly WNL.  Psych: Affect normal   Respiratory: Breathing not labored.  Cardiovascular: normal peripheral pulses  Lymph: no adenopathy  Skin: No rashes,worrisome lesions or skin problems    "

## 2018-08-06 ENCOUNTER — RADIANT APPOINTMENT (OUTPATIENT)
Dept: MRI IMAGING | Facility: CLINIC | Age: 48
End: 2018-08-06
Attending: PHYSICIAN ASSISTANT
Payer: COMMERCIAL

## 2018-08-06 ENCOUNTER — RADIANT APPOINTMENT (OUTPATIENT)
Dept: GENERAL RADIOLOGY | Facility: CLINIC | Age: 48
End: 2018-08-06
Attending: PHYSICIAN ASSISTANT
Payer: COMMERCIAL

## 2018-08-06 DIAGNOSIS — M25.512 LEFT SHOULDER PAIN, UNSPECIFIED CHRONICITY: ICD-10-CM

## 2018-08-06 RX ORDER — LIDOCAINE HYDROCHLORIDE 10 MG/ML
30 INJECTION, SOLUTION EPIDURAL; INFILTRATION; INTRACAUDAL; PERINEURAL ONCE
Status: COMPLETED | OUTPATIENT
Start: 2018-08-06 | End: 2018-08-06

## 2018-08-06 RX ORDER — IOPAMIDOL 408 MG/ML
20 INJECTION, SOLUTION INTRATHECAL ONCE
Status: COMPLETED | OUTPATIENT
Start: 2018-08-06 | End: 2018-08-06

## 2018-08-06 RX ADMIN — LIDOCAINE HYDROCHLORIDE 10 ML: 10 INJECTION, SOLUTION EPIDURAL; INFILTRATION; INTRACAUDAL; PERINEURAL at 14:24

## 2018-08-06 RX ADMIN — IOPAMIDOL 7 ML: 408 INJECTION, SOLUTION INTRATHECAL at 14:24

## 2018-08-09 ENCOUNTER — OFFICE VISIT (OUTPATIENT)
Dept: ORTHOPEDICS | Facility: CLINIC | Age: 48
End: 2018-08-09
Payer: COMMERCIAL

## 2018-08-09 VITALS
WEIGHT: 210 LBS | HEART RATE: 84 BPM | RESPIRATION RATE: 13 BRPM | SYSTOLIC BLOOD PRESSURE: 118 MMHG | HEIGHT: 71 IN | DIASTOLIC BLOOD PRESSURE: 79 MMHG | BODY MASS INDEX: 29.4 KG/M2

## 2018-08-09 DIAGNOSIS — S43.432D LABRAL TEAR OF SHOULDER, LEFT, SUBSEQUENT ENCOUNTER: ICD-10-CM

## 2018-08-09 DIAGNOSIS — M25.312 SHOULDER INSTABILITY, LEFT: Primary | ICD-10-CM

## 2018-08-09 PROCEDURE — 99213 OFFICE O/P EST LOW 20 MIN: CPT | Performed by: ORTHOPAEDIC SURGERY

## 2018-08-09 NOTE — MR AVS SNAPSHOT
After Visit Summary   8/9/2018    Suhail Mustafa    MRN: [id number]           Patient Information     Date Of Birth          1970        Visit Information        Provider Department      8/9/2018 3:00 PM Aimee Galicia MD [facility]        Today's Diagnoses     Shoulder instability, left    -  1    Labral tear of shoulder, left, subsequent encounter           Follow-ups after your visit        Additional Services     ORTHO  REFERRAL       [facility] is referring you to the Orthopedic  Services at [facility].       The  Representative will assist you in the coordination of your Orthopedic and Musculoskeletal Care as prescribed by your physician.    The  Representative will call you within 1 business day to help schedule your appointment, or you may contact the  Representative at:    All areas ~ [phone]     Type of Referral : Surgical / Specialist - [facility] Orthopedic Surgery - Dr. Jorge L Bardales or Dr. Anai Mata - Left shoulder posterior instability and posterior labrum tear -REFERRED BY AIMEE GALICIA MD      Timeframe requested: Routine    Coverage of these services is subject to the terms and limitations of your health insurance plan.  Please call member services at your health plan with any benefit or coverage questions.      If X-rays, CT or MRI's have been performed, please contact the facility where they were done to arrange for , prior to your scheduled appointment.  Please bring this referral request to your appointment and present it to your specialist.                  Your next 10 appointments already scheduled     Nov 07, 2018  7:00 AM CST   (Arrive by 6:45 AM)   Return Visit with Yaneth Rogel MD   [facility] ([facility])    [address] [place] [id number] [phone]  "             Who to contact     If you have questions or need follow up information about today's clinic visit or your schedule please contact [facility] directly at [phone].  Normal or non-critical lab and imaging results will be communicated to you by MyChart, letter or phone within 4 business days after the clinic has received the results. If you do not hear from us within 7 days, please contact the clinic through MyChart or phone. If you have a critical or abnormal lab result, we will notify you by phone as soon as possible.  Submit refill requests through Kelan or call your pharmacy and they will forward the refill request to us. Please allow 3 business days for your refill to be completed.          Additional Information About Your Visit        [facility] Medical Information     Kelan gives you secure access to your electronic health record. If you see a primary care provider, you can also send messages to your care team and make appointments. If you have questions, please call your primary care clinic.  If you do not have a primary care provider, please call [phone] and they will assist you.        Care EveryWhere ID     This is your Care EveryWhere ID. This could be used by other organizations to access your [place] medical records  [id number]        Your Vitals Were     Pulse Respirations Height BMI (Body Mass Index)          84 13 1.797 m (5' 10.75\") 29.5 kg/m2         Blood Pressure from Last 3 Encounters:   08/09/18 118/79   07/24/18 131/82   04/24/18 109/71    Weight from Last 3 Encounters:   08/09/18 95.3 kg (210 lb)   07/24/18 95.3 kg (210 lb)   05/03/18 96.2 kg (212 lb)              We Performed the Following     ORTHO  REFERRAL        Primary Care Provider Office Phone # Fax #    To Whitt [phone]       [address] [facility] [address]        Equal Access to Services     ALEXANDER RDZ AH: Hadii rina Oden " nathanael robandrea pangjalil claudiadixie crow ah. So [facility] [phone].    ATENCIÓN: Si [place], tiene a martin disposición servicios gratuitos de asistencia lingüística. Damon al [phone].    We comply with applicable federal civil rights laws and [place] laws. We do not discriminate on the basis of race, color, national origin, age, disability, sex, sexual orientation, or gender identity.            Thank you!     Thank you for choosing [facility]  for your care. Our goal is always to provide you with excellent care. Hearing back from our patients is one way we can continue to improve our services. Please take a few minutes to complete the written survey that you may receive in the mail after your visit with us. Thank you!             Your Updated Medication List - Protect others around you: Learn how to safely use, store and throw away your medicines at www.disposemymeds.org.          This list is accurate as of 8/9/18  3:30 PM.  Always use your most recent med list.                   Brand Name Dispense Instructions for use Diagnosis    acetaminophen 325 MG tablet    TYLENOL    100 tablet    Take 2 tablets (650 mg) by mouth every 6 hours as needed for mild pain    Donor of kidney for transplant       acetic acid 2 % otic solution    VOSOL     INSTILL 4 DROPS TO THE LEFT EAR- DAILY OR EVERY OTHER DAY        simvastatin 20 MG tablet    ZOCOR    90 tablet    Take 1 tablet (20 mg) by mouth At Bedtime    Hyperlipidemia LDL goal <100       triamcinolone 0.1 % ointment    KENALOG    80 g    Apply sparingly to affected area three times a day    Other eczema       VITAMIN D (CHOLECALCIFEROL) PO      Take 1,000 Units by mouth daily

## 2018-08-09 NOTE — LETTER
8/9/2018         RE: Suhail Mustafa  [address] [place] [address]        Dear Colleague,    Thank you for referring your patient, Suhail Mustafa, to the [facility]. Please see a copy of my visit note below.    Suhail Mustafa returns for his left shoulder MRI results.  MRI images were independently visualized with the patient.  These show no rotator cuff tear, moderate acromio-clavicular hypertrophy, a type 2 acromion, no biceps tendonosis, significant posterior glenoid labrum tear, moderate posterior glenohumeral osteoarthritis.      Impression:  Left shoulder posterior labrum tear and posterior osteoarthritis consistent with posterior subluxation.    We discussed options, risks, benefits of conservative and surgical treatment.    I recommend referral to [facility] shoulder for likely stabilization.  Work on rotator cuff strengthening in the meantime.    Total time spent was 15 minutes; with 15 minutes spent face-to-face with patient discussing test results, treatment options, and estimated recovery time.            Again, thank you for allowing me to participate in the care of your patient.        Sincerely,        To Callaway MD

## 2018-08-10 PROBLEM — S43.432D LABRAL TEAR OF SHOULDER, LEFT, SUBSEQUENT ENCOUNTER: Status: ACTIVE | Noted: 2018-08-10

## 2018-08-10 PROBLEM — M25.312 SHOULDER INSTABILITY, LEFT: Status: ACTIVE | Noted: 2018-08-10

## 2018-08-10 NOTE — PROGRESS NOTES
Suhail Mustafa returns for his left shoulder MRI results.  MRI images were independently visualized with the patient.  These show no rotator cuff tear, moderate acromio-clavicular hypertrophy, a type 2 acromion, no biceps tendonosis, significant posterior glenoid labrum tear, moderate posterior glenohumeral osteoarthritis.      Impression:  Left shoulder posterior labrum tear and posterior osteoarthritis consistent with posterior subluxation.    We discussed options, risks, benefits of conservative and surgical treatment.    I recommend referral to [facility] shoulder for likely stabilization.  Work on rotator cuff strengthening in the meantime.    Total time spent was 15 minutes; with 15 minutes spent face-to-face with patient discussing test results, treatment options, and estimated recovery time.

## 2018-08-16 ENCOUNTER — PRE VISIT (OUTPATIENT)
Dept: ORTHOPEDICS | Facility: CLINIC | Age: 48
End: 2018-08-16

## 2018-08-16 NOTE — TELEPHONE ENCOUNTER
FUTURE VISIT INFORMATION      FUTURE VISIT INFORMATION:    Date: 8/22    Time: 7:00    Location: [facility]  REFERRAL INFORMATION:    Referring provider:  To Callaway    Referring providers clinic:  [facility]    Reason for visit/diagnosis  L shoulder    RECORDS REQUESTED FROM:       Clinic name Comments Records Status Imaging Status                                         RECORDS STATUS      All records internal

## 2018-08-20 DIAGNOSIS — G89.29 CHRONIC LEFT SHOULDER PAIN: Primary | ICD-10-CM

## 2018-08-20 DIAGNOSIS — M25.512 CHRONIC LEFT SHOULDER PAIN: Primary | ICD-10-CM

## 2018-08-22 ENCOUNTER — OFFICE VISIT (OUTPATIENT)
Dept: ORTHOPEDICS | Facility: CLINIC | Age: 48
End: 2018-08-22
Payer: COMMERCIAL

## 2018-08-22 ENCOUNTER — RADIANT APPOINTMENT (OUTPATIENT)
Dept: GENERAL RADIOLOGY | Facility: CLINIC | Age: 48
End: 2018-08-22
Attending: ORTHOPAEDIC SURGERY
Payer: COMMERCIAL

## 2018-08-22 VITALS — WEIGHT: 210.1 LBS | HEIGHT: 71 IN | BODY MASS INDEX: 29.41 KG/M2

## 2018-08-22 DIAGNOSIS — G89.29 CHRONIC LEFT SHOULDER PAIN: ICD-10-CM

## 2018-08-22 DIAGNOSIS — M25.512 CHRONIC LEFT SHOULDER PAIN: ICD-10-CM

## 2018-08-22 DIAGNOSIS — M19.012 ARTHRITIS OF SHOULDER REGION, LEFT: Primary | ICD-10-CM

## 2018-08-22 ASSESSMENT — ENCOUNTER SYMPTOMS
SINUS PAIN: 0
SORE THROAT: 0
SINUS CONGESTION: 1
NECK PAIN: 0
SMELL DISTURBANCE: 0
BACK PAIN: 0
JOINT SWELLING: 0
NECK MASS: 0
MUSCLE CRAMPS: 1
STIFFNESS: 1
TASTE DISTURBANCE: 0
MUSCLE WEAKNESS: 1
HOARSE VOICE: 0
MYALGIAS: 1
TROUBLE SWALLOWING: 0
ARTHRALGIAS: 1

## 2018-08-22 NOTE — MR AVS SNAPSHOT
After Visit Summary   8/22/2018    Suhail Mustafa    MRN: [id number]           Patient Information     Date Of Birth          1970        Visit Information        Provider Department      8/22/2018 7:00 AM Anai Mata MD [facility]        Today's Diagnoses     Arthritis of shoulder region, left    -  1       Follow-ups after your visit        Your next 10 appointments already scheduled     Nov 07, 2018  7:00 AM CST   (Arrive by 6:45 AM)   Return Visit with Yaneth Rogel MD   [facility] ([facility])    [address] [place] [id number] [phone]            Nov 16, 2018   Procedure with Anai Mata MD   [facility] ([facility])    [address] [place] [id number] [phone]           Located in the Clinics and Surgery Center at [address] [place] [address].   parking is very convenient and highly recommended.  is a $6 flat rate fee.  Both  and self parkers should enter the main arrival plaza from [facility]; parking attendants will direct you based on your parking preference.            Nov 28, 2018  2:30 PM CST   (Arrive by 2:15 PM)   RETURN SHOULDER with Anai Mata MD   [facility] ([facility])    [address] [place] [id number] [phone]            Dec 28, 2018  2:30 PM CST   (Arrive by 2:15 PM)   RETURN SHOULDER with Anai Mata MD   [facility] ([facility])    [address] [place] [phone] [phone]              Who to contact     Please call your clinic at [phone] to:    Ask questions about your health    Make or cancel appointments    Discuss your medicines    Learn about your test results    Speak to your  "doctor            Additional Information About Your Visit        MyChart Information     Rowl gives you secure access to your electronic health record. If you see a primary care provider, you can also send messages to your care team and make appointments. If you have questions, please call your primary care clinic.  If you do not have a primary care provider, please call [phone] and they will assist you.      Rowl is an electronic gateway that provides easy, online access to your medical records. With Rowl, you can request a clinic appointment, read your test results, renew a prescription or communicate with your care team.     To access your existing account, please contact your [facility] or call [phone] for assistance.        Care EveryWhere ID     This is your Care EveryWhere ID. This could be used by other organizations to access your [place] medical records  [id number]        Your Vitals Were     Height BMI (Body Mass Index)                1.797 m (5' 10.75\") 29.51 kg/m2           Blood Pressure from Last 3 Encounters:   08/09/18 118/79   07/24/18 131/82   04/24/18 109/71    Weight from Last 3 Encounters:   08/22/18 95.3 kg (210 lb 1.6 oz)   08/09/18 95.3 kg (210 lb)   07/24/18 95.3 kg (210 lb)              We Performed the Following     Yanelis-Operative Worksheet        Primary Care Provider Office Phone # Fax #    To Whitt [phone]       [address] [facility] [address]        Equal Access to Services     ALEXANDER RDZ : Hadii aad ku hadasho Soomaali, waaxda luqadaha, qaybta kaalmada adeegyada, dixie cobb hayananda hurtado . So [facility] [phone].    ATENCIÓN: Si habla español, tiene a martin disposición servicios gratuitos de asistencia lingüística. Llame al [phone].    We comply with applicable federal civil rights laws and [place] laws. We do not discriminate on the basis of race, color, national " origin, age, disability, sex, sexual orientation, or gender identity.            Thank you!     Thank you for choosing [facility]  for your care. Our goal is always to provide you with excellent care. Hearing back from our patients is one way we can continue to improve our services. Please take a few minutes to complete the written survey that you may receive in the mail after your visit with us. Thank you!             Your Updated Medication List - Protect others around you: Learn how to safely use, store and throw away your medicines at www.disposemymeds.org.          This list is accurate as of 8/22/18 11:59 PM.  Always use your most recent med list.                   Brand Name Dispense Instructions for use Diagnosis    acetaminophen 325 MG tablet    TYLENOL    100 tablet    Take 2 tablets (650 mg) by mouth every 6 hours as needed for mild pain    Donor of kidney for transplant       acetic acid 2 % otic solution    VOSOL     INSTILL 4 DROPS TO THE LEFT EAR- DAILY OR EVERY OTHER DAY        simvastatin 20 MG tablet    ZOCOR    90 tablet    Take 1 tablet (20 mg) by mouth At Bedtime    Hyperlipidemia LDL goal <100       triamcinolone 0.1 % ointment    KENALOG    80 g    Apply sparingly to affected area three times a day    Other eczema       VITAMIN D (CHOLECALCIFEROL) PO      Take 1,000 Units by mouth daily

## 2018-08-22 NOTE — LETTER
8/22/2018       RE: Suhail Mustafa  [address] [place] [address]     Dear Colleague,    Thank you for referring your patient, Suhail Mustafa, [facility]. Please see a copy of my visit note below.    CHIEF CONCERN:  Left shoulder pain    HISTORY OF PRESENT ILLNESS:  Mr. Mustafa is a 48 year old RHD gentleman I am seeing for left shoulder pain. He notes his pain started 3 years ago. It has been more bothersome with activities like bench-press and lat pulls at the gym (where he feels weak). A few weeks ago his pain was much worse after 3 days of golf. Pain is better with rest. He has done PT with Gaby S and his motion is better but pain persists. He has tried Tylenol.    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 2013    Vasectomy     LAPAROSCOPIC DONOR HAND ASSISTED KIDNEY LIVING RELATED N/A 10/13/2016    Procedure: LAPAROSCOPIC DONOR HAND ASSISTED KIDNEY LIVING RELATED;  Surgeon: Tigre Quarles MD;  Location: [facility] OR     LASER DIODE TYMPANOMASTOIDECTOMY Left 3/31/2015    Procedure: LASER DIODE TYMPANOMASTOIDECTOMY;  Surgeon: Yaneth Rogel MD;  Location: [facility] OR     ORTHOPEDIC SURGERY  child    dislocated hip     OSSICULAR CHAIN RECONSTRUCTION N/A 10/9/2015    Procedure: OSSICULAR CHAIN RECONSTRUCTION;  Surgeon: Yaneth Rogel MD;  Location: [facility] OR     TYMPANOPLASTY Left 10/9/2015    Procedure: TYMPANOPLASTY;   Surgeon: Yaneth Rogel MD;  Location: [facility] OR       Current Outpatient Prescriptions   Medication Sig Dispense Refill     acetaminophen (TYLENOL) 325 MG tablet Take 2 tablets (650 mg) by mouth every 6 hours as needed for mild pain 100 tablet 0     acetic acid (VOSOL) 2 % otic solution INSTILL 4 DROPS TO THE LEFT EAR- DAILY OR EVERY OTHER DAY  11     simvastatin (ZOCOR) 20 MG tablet Take 1 tablet (20 mg) by mouth At Bedtime 90 tablet 3     triamcinolone (KENALOG) 0.1 % ointment Apply sparingly to affected area three times a day 80 g 2     VITAMIN D, CHOLECALCIFEROL, PO Take 1,000 Units by mouth daily           No Known Allergies    SOCIAL HISTORY:    Social History     Social History     Marital status:      Spouse name: Kenna     Number of children: 2     Years of education: 16     Occupational History           Social History Main Topics     Smoking status: Never Smoker     Smokeless tobacco: Never Used     Alcohol use 0.6 oz/week     1 Standard drinks or equivalent per week      Comment: once per week with a meal     Drug use: No     Sexual activity: Yes     Partners: Female     Birth control/ protection: None      Comment: Vaectomy 2013     Other Topics Concern     Parent/Sibling W/ Cabg, Mi Or Angioplasty Before 65f 55m? Yes     Brother had procedure to remove an extra lead in the heart     Blood Transfusions No     Seat Belt Yes     Social History Narrative       FAMILY HISTORY: Reviewed in EMR      REVIEW OF SYSTEMS: Positive for that noted in past medical history and history of present illness and otherwise reviewed in EMR     PHYSICAL EXAM:    Adult male in NAD  Respirations even and unlabored.   Focused upper extremity exam: Skin intact. No erythema. Sensation intact all dermatomes into the hand to light touch. EPL, FPL, and Intrinsics intact. Right shoulder active motion is FE to 180, ER at side to 80, and IR to T10. Left shoulder active motion is FE to 160, ER to 80, and IR to T12.   Negative Neer and Barroso. No pain on palpation over the AC joint. Some focal pain on palpation over the long head of the biceps. Some pain with FE strength testing with strength near 5/5 on left, is 5/5 in ER. Grossly positive Speeds and OBriens.     IMAGING:  Left shoulder xrays with findings as below (I agree with imaging report):  IMPRESSION:  1. No evidence of fracture or dislocation involving the left shoulder.  2. Osteoarthrosis in the left shoulder glenohumeral joint, as above.    Left shoulder MRI 8/6/18 was independently reviewed by me and I agree with the findings below:  Impression:  1. Large tear involving the superior labrum as it extends from anterior to posterior, with marked tearing of the entire posterior labrum.  2. Complete loss of articular cartilage along the posterior aspect of the glenoid with subchondral cyst/edema. This is consistent with severe degenerative change.  3. Mild tendinopathy of the left shoulder distal supraspinatus and infraspinatus tendon. No full-thickness tear or retraction of the rotator cuff tendons. Rotator cuff muscle bulk is normal.  4. Mild to moderate degenerative changes of acromioclavicular joint.  5. Long head of the bicipital tendon is normal.    ASSESSMENT:    1. Left shoulder glenohumeral arthrosis  2. Left posterior labral tear  3. Left long head biceps disease    PLAN:  I reviewed the imaging and exam findings with the patient. We discussed the risks and benefits of both non-operative and operative treatment. I reviewed that non-operative options include additional PT, anti-inflammatories (though these are limited for him because of his prior kidney donation), and activity modifications. We also reviewed surgical options and possible expectations postop. He would like to proceed with surgery. We discussed left shoulder diagnostic arthroscopy, labral debridement, possible chondroplasty, open biceps tenodesis. He is going to consider his options and will  proceed with scheduling pending his discussion with family.    Anai Mata MD

## 2018-08-22 NOTE — NURSING NOTE
"Reason For Visit:   Chief Complaint   Patient presents with     Consult     Left shoulder pain.  Posible posterior instability and posterior labrum tear.  Ref. Dr. To Callaway       PCP: To Whitt  Ref: Dr. To Callaway    ?  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  Work status?  Full time.  Date of injury: 1990s  Type of injury: dislocated his shoulder playing boot hockey.  Pain started to increase about 3 years ago.  Date of surgery: NA  Type of surgery: NA.  Smoker: No  Request smoking cessation information: No    Right hand dominant    SANE score  Affected shoulder: Left  Right shoulder SANE: 100  Left shoulder SANE: 50    Ht 1.797 m (5' 10.75\")  Wt 95.3 kg (210 lb 1.6 oz)  BMI 29.51 kg/m2      Pain Assessment  Patient Currently in Pain: Yes  0-10 Pain Scale:  (2-3)  Primary Pain Location: Shoulder  Pain Orientation: Left  Pain Descriptors: Tender  Alleviating Factors: Rest, Pain medication (tylenol)  Aggravating Factors: Movement, Other (comment) (usage)    Jewels Corbett ATC        "

## 2018-08-22 NOTE — NURSING NOTE
Teaching Flowsheet   Relevant Diagnosis: Left glenohumeral arthrosis, labral tear, long head biceps disease  Teaching Topic: Pre-op for left shoulder diagnostic arthroscopy, labral debridement, possible chondroplasty, open biceps tenodesis - patient will call to schedule     Person(s) involved in teaching:   Patient     Motivation Level:  Asks Questions: Yes  Cooperative: Yes  Receptive (willing/able to accept information): Yes  Any cultural factors/Confucianism beliefs that may influence understanding or compliance? No     Patient demonstrates understanding of the following:  Reason for the appointment, diagnosis and treatment plan: Yes  Knowledge of proper use of medications and conditions for which they are ordered (with special attention to potential side effects or drug interactions): Yes  Which situations necessitate calling provider and whom to contact: Yes     Teaching Concerns Addressed:   Pre-op H&P by primary provider  Spouse will provide transportation and assistance with cares after surgery  Aware of post-op limitations    Proper use and care of sling x 6 weeks (medical equip, care aids, etc.): Yes  Nutritional needs and diet plan: Yes  Pain management techniques: Yes  Wound Care: Yes     Instructional Materials Used/Given: Pre-op packet, surgical soap     Time spent with patient: 12.

## 2018-09-03 NOTE — PROGRESS NOTES
CHIEF CONCERN:  Left shoulder pain    HISTORY OF PRESENT ILLNESS:  Mr. Mustafa is a 48 year old RHD gentleman I am seeing for left shoulder pain. He notes his pain started 3 years ago. It has been more bothersome with activities like bench-press and lat pulls at the gym (where he feels weak). A few weeks ago his pain was much worse after 3 days of golf. Pain is better with rest. He has done PT with Gaby S and his motion is better but pain persists. He has tried Tylenol.    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 2013    Vasectomy     LAPAROSCOPIC DONOR HAND ASSISTED KIDNEY LIVING RELATED N/A 10/13/2016    Procedure: LAPAROSCOPIC DONOR HAND ASSISTED KIDNEY LIVING RELATED;  Surgeon: Tigre Quarles MD;  [facility]: [facility]     LASER DIODE TYMPANOMASTOIDECTOMY Left 3/31/2015    Procedure: LASER DIODE TYMPANOMASTOIDECTOMY;  Surgeon: Yaneth Rogel MD;  Location: [facility]     ORTHOPEDIC SURGERY  child    dislocated hip     OSSICULAR CHAIN RECONSTRUCTION N/A 10/9/2015    Procedure: OSSICULAR CHAIN RECONSTRUCTION;  Surgeon: Yaneth Rogel MD;  [facility]     TYMPANOPLASTY Left 10/9/2015    Procedure: TYMPANOPLASTY;  Surgeon: Yaneth Rogel MD;  [facility]       Current Outpatient Prescriptions   Medication Sig Dispense Refill     acetaminophen (TYLENOL) 325 MG tablet Take 2 tablets (650 mg) by mouth every 6 hours as needed for mild pain 100 tablet 0     acetic acid (VOSOL) 2 % otic solution  INSTILL 4 DROPS TO THE LEFT EAR- DAILY OR EVERY OTHER DAY  11     simvastatin (ZOCOR) 20 MG tablet Take 1 tablet (20 mg) by mouth At Bedtime 90 tablet 3     triamcinolone (KENALOG) 0.1 % ointment Apply sparingly to affected area three times a day 80 g 2     VITAMIN D, CHOLECALCIFEROL, PO Take 1,000 Units by mouth daily           No Known Allergies    SOCIAL HISTORY:    Social History     Social History     Marital status:      Spouse name: Kenna     Number of children: 2     Years of education: 16     Occupational History           Social History Main Topics     Smoking status: Never Smoker     Smokeless tobacco: Never Used     Alcohol use 0.6 oz/week     1 Standard drinks or equivalent per week      Comment: once per week with a meal     Drug use: No     Sexual activity: Yes     Partners: Female     Birth control/ protection: None      Comment: Vaectomy 2013     Other Topics Concern     Parent/Sibling W/ Cabg, Mi Or Angioplasty Before 65f 55m? Yes     Brother had procedure to remove an extra lead in the heart     Blood Transfusions No     Seat Belt Yes     Social History Narrative       FAMILY HISTORY: Reviewed in EMR      REVIEW OF SYSTEMS: Positive for that noted in past medical history and history of present illness and otherwise reviewed in EMR     PHYSICAL EXAM:    Adult male in NAD  Respirations even and unlabored.   Focused upper extremity exam: Skin intact. No erythema. Sensation intact all dermatomes into the hand to light touch. EPL, FPL, and Intrinsics intact. Right shoulder active motion is FE to 180, ER at side to 80, and IR to T10. Left shoulder active motion is FE to 160, ER to 80, and IR to T12.  Negative Neer and Barroso. No pain on palpation over the AC joint. Some focal pain on palpation over the long head of the biceps. Some pain with FE strength testing with strength near 5/5 on left, is 5/5 in ER. Grossly positive Speeds and OBriens.     IMAGING:  Left shoulder xrays  with findings as below (I agree with imaging report):  IMPRESSION:  1. No evidence of fracture or dislocation involving the left shoulder.  2. Osteoarthrosis in the left shoulder glenohumeral joint, as above.    Left shoulder MRI 8/6/18 was independently reviewed by me and I agree with the findings below:  Impression:  1. Large tear involving the superior labrum as it extends from anterior to posterior, with marked tearing of the entire posterior labrum.  2. Complete loss of articular cartilage along the posterior aspect of the glenoid with subchondral cyst/edema. This is consistent with severe degenerative change.  3. Mild tendinopathy of the left shoulder distal supraspinatus and infraspinatus tendon. No full-thickness tear or retraction of the rotator cuff tendons. Rotator cuff muscle bulk is normal.  4. Mild to moderate degenerative changes of acromioclavicular joint.  5. Long head of the bicipital tendon is normal.    ASSESSMENT:    1. Left shoulder glenohumeral arthrosis  2. Left posterior labral tear  3. Left long head biceps disease    PLAN:  I reviewed the imaging and exam findings with the patient. We discussed the risks and benefits of both non-operative and operative treatment. I reviewed that non-operative options include additional PT, anti-inflammatories (though these are limited for him because of his prior kidney donation), and activity modifications. We also reviewed surgical options and possible expectations postop. He would like to proceed with surgery. We discussed left shoulder diagnostic arthroscopy, labral debridement, possible chondroplasty, open biceps tenodesis. He is going to consider his options and will proceed with scheduling pending his discussion with family.    Anai Mata MD    Answers for HPI/ROS submitted by the patient on 8/22/2018   General Symptoms: No  Skin Symptoms: No  HENT Symptoms: Yes  EYE SYMPTOMS: No  HEART SYMPTOMS: No  LUNG SYMPTOMS: No  INTESTINAL SYMPTOMS:  No  URINARY SYMPTOMS: No  REPRODUCTIVE SYMPTOMS: No  SKELETAL SYMPTOMS: Yes  BLOOD SYMPTOMS: No  NERVOUS SYSTEM SYMPTOMS: No  MENTAL HEALTH SYMPTOMS: No  Ear pain: Yes  Ear discharge: Yes  Hearing loss: Yes  Tinnitus: Yes  Nosebleeds: No  Congestion: Yes  Sinus pain: No  Trouble swallowing: No   Voice hoarseness: No  Mouth sores: No  Sore throat: No  Tooth pain: No  Gum tenderness: No  Bleeding gums: No  Change in taste: No  Change in sense of smell: No  Dry mouth: No  Hearing aid used: Yes  Neck lump: No  Back pain: No  Muscle aches: Yes  Neck pain: No  Swollen joints: No  Joint pain: Yes  Bone pain: No  Muscle cramps: Yes  Muscle weakness: Yes  Joint stiffness: Yes  Bone fracture: No

## 2018-09-06 ENCOUNTER — TELEPHONE (OUTPATIENT)
Dept: ORTHOPEDICS | Facility: CLINIC | Age: 48
End: 2018-09-06

## 2018-09-06 NOTE — TELEPHONE ENCOUNTER
Patient is scheduled for surgery with Dr. Mata      Spoke or left message with: patient    Date of Surgery: 11/16/18    [facility]    H&P: Scheduled with primary clinic with in 30 days of surgery.    Surgery packet: given to patient at clinic appointment by nurse    Additional comments: patient will wait for pre op phone call 1-2 days prior to surgery for arrival time. Patient also had questions regarding when he can go back to work, he works at a computer. Told patient I would send a message to the nurse and have her call him back. Patient agreed.

## 2018-09-17 ENCOUNTER — TRANSFERRED RECORDS (OUTPATIENT)
Dept: HEALTH INFORMATION MANAGEMENT | Facility: CLINIC | Age: 48
End: 2018-09-17

## 2018-10-16 DIAGNOSIS — Z52.4 KIDNEY DONOR: Primary | ICD-10-CM

## 2018-10-22 DIAGNOSIS — Z52.4 KIDNEY DONOR: ICD-10-CM

## 2018-10-22 LAB
ALBUMIN UR-MCNC: NEGATIVE MG/DL
APPEARANCE UR: CLEAR
BILIRUB UR QL STRIP: NEGATIVE
COLOR UR AUTO: YELLOW
CREAT SERPL-MCNC: 1.63 MG/DL (ref 0.66–1.25)
CREAT UR-MCNC: 110 MG/DL
GFR SERPL CREATININE-BSD FRML MDRD: 45 ML/MIN/1.7M2
GLUCOSE UR STRIP-MCNC: NEGATIVE MG/DL
HGB UR QL STRIP: NEGATIVE
KETONES UR STRIP-MCNC: 5 MG/DL
LEUKOCYTE ESTERASE UR QL STRIP: NEGATIVE
MICROALBUMIN UR-MCNC: <5 MG/L
MICROALBUMIN/CREAT UR: NORMAL MG/G CR (ref 0–17)
MUCOUS THREADS #/AREA URNS LPF: PRESENT /LPF
NITRATE UR QL: NEGATIVE
PH UR STRIP: 6 PH (ref 5–7)
PROT UR-MCNC: <0.05 G/L
PROT/CREAT 24H UR: NORMAL G/G CR (ref 0–0.2)
RBC #/AREA URNS AUTO: 0 /HPF (ref 0–2)
SOURCE: ABNORMAL
SP GR UR STRIP: 1.01 (ref 1–1.03)
UROBILINOGEN UR STRIP-MCNC: 0 MG/DL (ref 0–2)
WBC #/AREA URNS AUTO: <1 /HPF (ref 0–5)

## 2018-11-05 NOTE — PROGRESS NOTES
[address] [id number] [phone]  Dept: [phone]    PRE-OP EVALUATION:  Today's date: 2018    Suhail Mustafa (: 1970) presents for pre-operative evaluation assessment as requested by Dr. Mata.  He requires evaluation and anesthesia risk assessment prior to undergoing surgery/procedure for treatment of left shoulder pain.    Fax number for surgical facility: [phone]  Primary Physician: To Whitt  Type of Anesthesia Anticipated: to be determined    Patient has a Health Care Directive or Living Will:  YES On file    Preop Questions 2018   Who is doing your surgery? Anai Mata MD   What are you having done? Left shoulder diagnostic arthroscopy, labral debridement, possible chondroplasty, open biceps tenodesis   Date of Surgery/Procedure: 2018   Facility or Hospital where procedure/surgery will be performed: [facility] [address]   1.  Do you have a history of Heart attack, stroke, stent, coronary bypass surgery, or other heart surgery? No   2.  Do you ever have any pain or discomfort in your chest? No   3.  Do you have a history of  Heart Failure? No   4.   Are you troubled by shortness of breath when:  walking on a level surface, or up a slight hill, or at night? No   5.  Do you currently have a cold, bronchitis or other respiratory infection? No   6.  Do you have a cough, shortness of breath, or wheezing? No   7.  Do you sometimes get pains in the calves of your legs when you walk? No   8. Do you or anyone in your family have previous history of blood clots? No   9.  Do you or does anyone in your family have a serious bleeding problem such as prolonged bleeding following surgeries or cuts? No   10. Have you ever had problems with anemia or been told to take iron pills? YES - he donates blood on a regular basis and has had some low readings in the past after donating   11. Have  you had any abnormal blood loss such as black, tarry or bloody stools? No   12. Have you ever had a blood transfusion? No   13. Have you or any of your relatives ever had problems with anesthesia? No   14. Do you have sleep apnea, excessive snoring or daytime drowsiness? No   15. Do you have any prosthetic heart valves? No   16. Do you have prosthetic joints? No         HPI:     HPI related to upcoming procedure: 48-year-old white male has had been having ongoing left shoulder pain that has not responded to conservative therapy.  He had an MRI which showed a large labral tear, arthritis, and other pathology.  He is in now for surgical treatment of this.      See problem list for active medical problems.  Problems all longstanding and stable, except as noted/documented.  See ROS for pertinent symptoms related to these conditions.                                                                                                                                                          .    MEDICAL HISTORY:     Patient Active Problem List    Diagnosis Date Noted     Labral tear of shoulder, left, subsequent encounter 08/10/2018     Priority: Medium     Shoulder instability, left 08/10/2018     Priority: Medium     CKD (chronic kidney disease) stage 3, GFR 30-59 ml/min (H) 04/25/2018     Priority: Medium     Hyperlipidemia LDL goal <100 04/24/2018     Priority: Medium     Overweight (BMI 25.0-29.9) 04/24/2018     Priority: Medium     Acute pain of left shoulder 06/17/2017     Priority: Medium     ACP (advance care planning) 12/21/2016     Priority: Medium     Advance Care Planning 12/21/2016: Receipt of ACP document:  Received: Health Care Directive which was witnessed or notarized on 10-12-16.  Document previously scanned on 10-25-16.  Validation form completed and sent to be scanned.  Code Status reflects choices in most recent ACP document.  Confirmed/documented designated decision maker(s).  Added by Micheline Degroot RN  Advance Care Planning Liaison with [employer]           Lichen simplex chronicus 11/09/2016     Priority: Medium     Donor of kidney for transplant 10/13/2016     Priority: Medium     Conductive hearing loss in left ear 10/29/2015     Priority: Medium     FH: kidney cancer 10/24/2013     Priority: Medium      Past Medical History:   Diagnosis Date     CKD (chronic kidney disease) stage 3, GFR 30-59 ml/min (H) 4/25/2018     Donor of kidney for transplant 10/13/2016    donated left kidney to brother     Hearing loss     Left     Hip dislocation 85    left     Hyperlipidemia LDL goal <100 4/24/2018     Shoulder dislocation 97    left     Tinnitus      Past Surgical History:   Procedure Laterality Date     ENT SURGERY  multiple times    pe tubes     ENT SURGERY      tonsils     ENT SURGERY  11/2011    Tubes 5 times since last time 10/21/14     GENITOURINARY SURGERY  Oct. 2013    Vasectomy     LAPAROSCOPIC DONOR HAND ASSISTED KIDNEY LIVING RELATED N/A 10/13/2016    Procedure: LAPAROSCOPIC DONOR HAND ASSISTED KIDNEY LIVING RELATED;  Surgeon: Tigre Quarles MD;  [facility]     LASER DIODE TYMPANOMASTOIDECTOMY Left 3/31/2015    Procedure: LASER DIODE TYMPANOMASTOIDECTOMY;  Surgeon: Yaneth Rogel MD;  [facility]     ORTHOPEDIC SURGERY  child    dislocated hip     OSSICULAR CHAIN RECONSTRUCTION N/A 10/9/2015    Procedure: OSSICULAR CHAIN RECONSTRUCTION;  Surgeon: Yaneth Rogel MD;  [facility]     TYMPANOPLASTY Left 10/9/2015    Procedure: TYMPANOPLASTY;  Surgeon: Yaneth Rogel MD;  [facility]     Current Outpatient Prescriptions   Medication Sig Dispense Refill     acetic acid (VOSOL) 2 % otic solution INSTILL 4 DROPS TO THE LEFT EAR- DAILY OR EVERY OTHER DAY  11     simvastatin (ZOCOR) 20 MG tablet Take 1 tablet (20 mg) by mouth At Bedtime 90 tablet 3     triamcinolone (KENALOG) 0.1 % ointment Apply sparingly to affected area three times a day 80 g 2     acetaminophen (TYLENOL) 325 MG tablet  Take 2 tablets (650 mg) by mouth every 6 hours as needed for mild pain (Patient not taking: Reported on 11/8/2018) 100 tablet 0     VITAMIN D, CHOLECALCIFEROL, PO Take 1,000 Units by mouth daily        OTC products: None, except as noted above    No Known Allergies   Latex Allergy: NO    Social History   Substance Use Topics     Smoking status: Never Smoker     Smokeless tobacco: Never Used     Alcohol use 0.6 oz/week     1 Standard drinks or equivalent per week      Comment: once per week with a meal     History   Drug Use No       REVIEW OF SYSTEMS:   CONSTITUTIONAL: NEGATIVE for fever, chills, change in weight  INTEGUMENTARY/SKIN: NEGATIVE for worrisome rashes, moles or lesions  EYES: NEGATIVE for vision changes or irritation  ENT/MOUTH: NEGATIVE for ear, mouth and throat problems  RESP: NEGATIVE for significant cough or SOB  BREAST: NEGATIVE for masses, tenderness or discharge  CV: NEGATIVE for chest pain, palpitations or peripheral edema  GI: NEGATIVE for nausea, abdominal pain, heartburn, or change in bowel habits  : NEGATIVE for frequency, dysuria, or hematuria  MUSCULOSKELETAL: See above  NEURO: NEGATIVE for weakness, dizziness or paresthesias  ENDOCRINE: NEGATIVE for temperature intolerance, skin/hair changes  HEME: NEGATIVE for bleeding problems  PSYCHIATRIC: NEGATIVE for changes in mood or affect    EXAM:   Wt 202 lb (91.6 kg)  BMI 28.98 kg/m2    GENERAL APPEARANCE: healthy, alert and no distress     EYES: EOMI,  PERRL     HENT: ear canals and TM's normal with a PE tube in the left TM inferiorly and nose and mouth without ulcers or lesions     NECK: no adenopathy, no asymmetry, masses, or scars and thyroid normal to palpation     RESP: lungs clear to auscultation - no rales, rhonchi or wheezes     CV: regular rates and rhythm, normal S1 S2, no S3 or S4 and no murmur, click or rub     ABDOMEN:  soft, nontender, no HSM or masses      MS: He has discomfort in the left shoulder with range of motion  including abduction above 90 degrees or so     SKIN: no suspicious lesions or rashes     NEURO: Normal strength and tone, sensory exam grossly normal, mentation intact and speech normal     PSYCH: mentation appears normal. and affect normal/bright    DIAGNOSTICS:   EKG: Normal Sinus Rhythm, short OR interval    Recent Labs   Lab Test  10/22/18   0626  04/24/18   0737   10/14/16   0625   10/12/16   0706  08/04/16   0934   06/26/15   0821   HGB   --   14.2   --   11.7*   < >  15.1  14.8   < >  13.2*   PLT   --   191   --    --    --   196  181   --   209   INR   --    --    --    --    --   1.06  1.00   --   1.09   NA   --   140   --    --    --   140  138   --   140   POTASSIUM   --   4.1   --    --    --   4.0  4.2   --   4.0   CR  1.63*  1.51*   < >  1.69*   --   1.08  0.93   --   1.00   A1C   --    --    --    --    --    --   5.1   --   5.1    < > = values in this interval not displayed.        IMPRESSION:   Reason for surgery/procedure: Left shoulder pain  Diagnosis/reason for consult: Preoperative history and physical    The proposed surgical procedure is considered INTERMEDIATE risk.    REVISED CARDIAC RISK INDEX  The patient has the following serious cardiovascular risks for perioperative complications such as (MI, PE, VFib and 3  AV Block):  No serious cardiac risks  INTERPRETATION: 0 risks: Class I (very low risk - 0.4% complication rate)    The patient has the following additional risks for perioperative complications:  Chronic kidney disease stage III      ICD-10-CM    1. Preop general physical exam Z01.818 EKG 12-lead, tracing only   2. Left shoulder pain, unspecified chronicity M25.512    3. Shoulder instability, left M25.312    4. Labral tear of shoulder, left, subsequent encounter S43.432D    5. CKD (chronic kidney disease) stage 3, GFR 30-59 ml/min (H) N18.3    6. Donor of kidney for transplant Z52.4    7. Hyperlipidemia LDL goal <100 E78.5 Lipid panel reflex to direct LDL Fasting        RECOMMENDATIONS:       APPROVAL GIVEN to proceed with proposed procedure, without further diagnostic evaluation       Signed Electronically by: To Whitt MD    Copy of this evaluation report is provided to requesting physician.    [facility] Preop Guidelines    Revised Cardiac Risk Index

## 2018-11-07 ENCOUNTER — OFFICE VISIT (OUTPATIENT)
Dept: OTOLARYNGOLOGY | Facility: CLINIC | Age: 48
End: 2018-11-07
Payer: COMMERCIAL

## 2018-11-07 VITALS — WEIGHT: 210 LBS | HEIGHT: 70 IN | BODY MASS INDEX: 30.06 KG/M2

## 2018-11-07 DIAGNOSIS — H92.12 OTORRHEA, LEFT: Primary | ICD-10-CM

## 2018-11-07 DIAGNOSIS — H69.92 EUSTACHIAN TUBE DISORDER, LEFT: ICD-10-CM

## 2018-11-07 ASSESSMENT — PAIN SCALES - GENERAL: PAINLEVEL: NO PAIN (0)

## 2018-11-07 NOTE — MR AVS SNAPSHOT
After Visit Summary   11/7/2018    Suhail Mustafa    MRN: [id number]           Patient Information     Date Of Birth          1970        Visit Information        Provider Department      11/7/2018 7:00 AM Yaneth Rogel MD [facility]        Today's Diagnoses     Otorrhea, left    -  1    Eustachian tube disorder, left          Care Instructions    You will need  to schedule a follow up appointment in 6 months.     We will contact you with results of today's culture.       Please call our clinic for any questions,concerns,or worsening symptoms.      Clinic #[phone]       Option 3  for the ENT Care Team.       Option 1 for scheduling.    Bhavna HAWTHORNE RNCC  [phone]          Follow-ups after your visit        Follow-up notes from your care team     Return in about 6 months (around 5/7/2019).      Your next 10 appointments already scheduled     Nov 08, 2018  1:40 PM CST   Pre-Op physical with To Whitt MD   [facility]    [address] [place] [id number] [phone]            Nov 16, 2018   Procedure with Anai Mata MD   [facility] ([facility])    [address] [place] [address]  [address] [facility] [id number] [phone]           Located in the Clinics and Surgery Center at [address] [place] [address].   parking is very convenient and highly recommended.  is a $6 flat rate fee.  Both  and self parkers should enter the main arrival plaza from [facility]; parking attendants will direct you based on your parking preference.            Nov 28, 2018  2:30 PM CST   (Arrive by 2:15 PM)   RETURN SHOULDER with Anai Mata MD   [facility] ([facility])    [address] [place] [address]  [address] [facility] [id number] [phone]            Dec 28,  "2018  2:30 PM CST   (Arrive by 2:15 PM)   RETURN SHOULDER with Anai Mata MD   [facility] ([facility])    [address] [address] [phone]              Who to contact     Please call your clinic at [phone] to:    Ask questions about your health    Make or cancel appointments    Discuss your medicines    Learn about your test results    Speak to your doctor            Additional Information About Your Visit        [employer] Information     [employer] gives you secure access to your electronic health record. If you see a primary care provider, you can also send messages to your care team and make appointments. If you have questions, please call your primary care clinic.  If you do not have a primary care provider, please call [phone] and they will assist you.      [employer] is an electronic gateway that provides easy, online access to your medical records. With [employer], you can request a clinic appointment, read your test results, renew a prescription or communicate with your care team.     To access your existing account, please contact your [facility] or call [phone] for assistance.        Care EveryWhere ID     This is your Care EveryWhere ID. This could be used by other organizations to access your [facility] medical records  [id number]        Your Vitals Were     Height BMI (Body Mass Index)                1.778 m (5' 10\") 30.13 kg/m2           Blood Pressure from Last 3 Encounters:   08/09/18 118/79   07/24/18 131/82   04/24/18 109/71    Weight from Last 3 Encounters:   11/07/18 95.3 kg (210 lb)   08/22/18 95.3 kg (210 lb 1.6 oz)   08/09/18 95.3 kg (210 lb)              We Performed the Following     BINOCULAR MICROSCOPY     Ear Culture Aerobic Bacterial     Fungus Culture, non-blood        Primary Care Provider Office Phone # Fax #    To Whitt [phone]    "    [address] [facility] [address]        Equal Access to Services     ALEXANDER RDZ : Hadii aad ku hadrainenolan Ford, wadannida nathanael, qaguanakitobenson pangkimberlysb juarez, dixie valverdechristineirma pimentel. So [facility] [phone].    ATENCIÓN: Si habla español, tiene a martin disposición servicios gratuitos de asistencia lingüística. Llame al [phone].    We comply with applicable federal civil rights laws and [place] laws. We do not discriminate on the basis of race, color, national origin, age, disability, sex, sexual orientation, or gender identity.            Thank you!     Thank you for choosing [facility] E[facility] NOSE AND TH[facility]  for your care. Our goal is always to provide you with excellent care. Hearing back from our patients is one way we can continue to improve our services. Please take a few minutes to complete the written survey that you may receive in the mail after your visit with us. Thank you!             Your Updated Medication List - Protect others around you: Learn how to safely use, store and throw away your medicines at www.disposemymeds.org.          This list is accurate as of 11/7/18 11:59 PM.  Always use your most recent med list.                   Brand Name Dispense Instructions for use Diagnosis    acetaminophen 325 MG tablet    TYLENOL    100 tablet    Take 2 tablets (650 mg) by mouth every 6 hours as needed for mild pain    Donor of kidney for transplant       acetic acid 2 % otic solution    VOSOL     INSTILL 4 DROPS TO THE LEFT EAR- DAILY OR EVERY OTHER DAY        simvastatin 20 MG tablet    ZOCOR    90 tablet    Take 1 tablet (20 mg) by mouth At Bedtime    Hyperlipidemia LDL goal <100       triamcinolone 0.1 % ointment    KENALOG    80 g    Apply sparingly to affected area three times a day    Other eczema       VITAMIN D (CHOLECALCIFEROL) PO      Take 1,000 Units by mouth daily

## 2018-11-07 NOTE — PATIENT INSTRUCTIONS
You will need  to schedule a follow up appointment in 6 months.     We will contact you with results of today's culture.       Please call our clinic for any questions,concerns,or worsening symptoms.      Clinic #[phone]       Option 3  for the ENT Care Team.       Option 1 for scheduling.    Bhavna HAWTHORNE RNCC  [phone]

## 2018-11-07 NOTE — NURSING NOTE
Chief Complaint   Patient presents with     RECHECK     6 month follow up - having shoulder surgery on Friday      Nathanael Delgado, EMT

## 2018-11-07 NOTE — LETTER
11/7/2018      RE: Suhail Mustafa  [address]   [place] [address]       Suhail Mustafa is seen for a left ear check.  He reports that the ear still feels full and is a little itchy with some episodes of foul smelling otorrhea for which he uses drops.  He does use drops about once a week but will use it daily when the drainage occurs.  He uses his hearing aid daily and has not noticed a change in hearing.    Physical examination:  male in no acute distress.  Alert and answering questions appropriately.  HB 1/6 bilaterally.  Left ear examined under the microscope.  Moist cerumen suctioned from the ear canal and off the TM which was cultured as it is somewhat wet, PE tube in position inferiorly and open, a little moisture around the tube but no otorrhea, stable mild epitympanic retraction pocket.    Assessment and plan:  Stable ear.  We'll see him in 6 months.      Yaneth Rogel MD

## 2018-11-08 ENCOUNTER — OFFICE VISIT (OUTPATIENT)
Dept: FAMILY MEDICINE | Facility: CLINIC | Age: 48
End: 2018-11-08
Payer: COMMERCIAL

## 2018-11-08 VITALS
SYSTOLIC BLOOD PRESSURE: 114 MMHG | HEART RATE: 78 BPM | WEIGHT: 202 LBS | OXYGEN SATURATION: 99 % | TEMPERATURE: 98.8 F | BODY MASS INDEX: 28.98 KG/M2 | DIASTOLIC BLOOD PRESSURE: 69 MMHG

## 2018-11-08 DIAGNOSIS — N18.30 CKD (CHRONIC KIDNEY DISEASE) STAGE 3, GFR 30-59 ML/MIN (H): ICD-10-CM

## 2018-11-08 DIAGNOSIS — Z01.818 PREOP GENERAL PHYSICAL EXAM: Primary | ICD-10-CM

## 2018-11-08 DIAGNOSIS — M25.312 SHOULDER INSTABILITY, LEFT: ICD-10-CM

## 2018-11-08 DIAGNOSIS — E78.5 HYPERLIPIDEMIA LDL GOAL <100: ICD-10-CM

## 2018-11-08 DIAGNOSIS — S43.432D LABRAL TEAR OF SHOULDER, LEFT, SUBSEQUENT ENCOUNTER: ICD-10-CM

## 2018-11-08 DIAGNOSIS — Z52.4 DONOR OF KIDNEY FOR TRANSPLANT: ICD-10-CM

## 2018-11-08 DIAGNOSIS — M25.512 LEFT SHOULDER PAIN, UNSPECIFIED CHRONICITY: ICD-10-CM

## 2018-11-08 PROCEDURE — 99215 OFFICE O/P EST HI 40 MIN: CPT | Performed by: FAMILY MEDICINE

## 2018-11-08 PROCEDURE — 93005 ELECTROCARDIOGRAM TRACING: CPT | Performed by: FAMILY MEDICINE

## 2018-11-08 NOTE — MR AVS SNAPSHOT
After Visit Summary   11/8/2018    Suhail Mustafa    MRN: [id number]           Patient Information     Date Of Birth          1970        Visit Information        Provider Department      11/8/2018 1:40 PM To Whitt MD [facility]        Today's Diagnoses     Preop general physical exam    -  1    Left shoulder pain, unspecified chronicity        Shoulder instability, left        Labral tear of shoulder, left, subsequent encounter        CKD (chronic kidney disease) stage 3, GFR 30-59 ml/min (H)        Donor of kidney for transplant        Hyperlipidemia LDL goal <100          Care Instructions      Before Your Surgery      Call your surgeon if there is any change in your health. This includes signs of a cold or flu (such as a sore throat, runny nose, cough, rash or fever).    Do not smoke, drink alcohol or take over the counter medicine (unless your surgeon or primary care doctor tells you to) for the 24 hours before and after surgery.    If you take prescribed drugs: Follow your doctor s orders about which medicines to take and which to stop until after surgery.    Eating and drinking prior to surgery: follow the instructions from your surgeon    Take a shower or bath the night before surgery. Use the soap your surgeon gave you to gently clean your skin. If you do not have soap from your surgeon, use your regular soap. Do not shave or scrub the surgery site.  Wear clean pajamas and have clean sheets on your bed.           Follow-ups after your visit        Follow-up notes from your care team     Return in about 5 months (around 4/8/2019) for Routine Visit, Physical Exam, Lab Work.      Your next 10 appointments already scheduled     Nov 16, 2018   Procedure with Anai Mata MD   [facility] ([facility])    [address] [place] [id number] [phone]           Located in the  [facility] at [address] [place] [id number].   parking is very convenient and highly recommended.  is a $6 flat rate fee.  Both  and self parkers should enter the main arrival plaza from [facility]; parking attendants will direct you based on your parking preference.            Nov 28, 2018  2:30 PM CST   (Arrive by 2:15 PM)   RETURN SHOULDER with Anai Mata MD   [facility] ([facility])    [address] [place] [id number] [phone]            Dec 28, 2018  2:30 PM CST   (Arrive by 2:15 PM)   RETURN SHOULDER with Anai Mata MD   [facility] ([facility])    [address] [place] [id number] [phone]              Future tests that were ordered for you today     Open Future Orders        Priority Expected Expires Ordered    Lipid panel reflex to direct LDL Fasting Routine 11/16/2018 11/30/2018 11/8/2018            Who to contact     If you have questions or need follow up information about today's clinic visit or your schedule please contact [facility] directly at [phone].  Normal or non-critical lab and imaging results will be communicated to you by [place], letter or phone within 4 business days after the clinic has received the results. If you do not hear from us within 7 days, please contact the clinic through [place] or phone. If you have a critical or abnormal lab result, we will notify you by phone as soon as possible.  Submit refill requests through Lastline or call your pharmacy and they will forward the refill request to us. Please allow 3 business days for your refill to be completed.          Additional Information About Your Visit        Lastline Information     Lastline gives you secure access to your electronic health record. If you see a primary care provider, you can also  send messages to your care team and make appointments. If you have questions, please call your primary care clinic.  If you do not have a primary care provider, please call [phone] and they will assist you.        Care EveryWhere ID     This is your Care EveryWhere ID. This could be used by other organizations to access your [facility] medical records  [id number]        Your Vitals Were     Pulse Temperature Pulse Oximetry BMI (Body Mass Index)          78 98.8  F (37.1  C) (Oral) 99% 28.98 kg/m2         Blood Pressure from Last 3 Encounters:   11/08/18 114/69   08/09/18 118/79   07/24/18 131/82    Weight from Last 3 Encounters:   11/08/18 202 lb (91.6 kg)   11/07/18 210 lb (95.3 kg)   08/22/18 210 lb 1.6 oz (95.3 kg)              We Performed the Following     EKG 12-lead, tracing only        Primary Care Provider Office Phone # Fax #    To Whitt [phone]       [address] [facility] [address]        Equal Access to Services     CRUZ RDZ : Hadii aad ku hadasho [place], waaxda luqadaha, qaybta kaalmada adetristin, dixie hurtado . So [facility] [phone].    ATENCIÓN: Si habla español, tiene a martin disposición servicios gratuitos de asistencia lingüística. LlLake County [facility] [phone].    We comply with applicable federal civil rights laws and [place] laws. We do not discriminate on the basis of race, color, national origin, age, disability, sex, sexual orientation, or gender identity.            Thank you!     Thank you for choosing [facility]  for your care. Our goal is always to provide you with excellent care. Hearing back from our patients is one way we can continue to improve our services. Please take a few minutes to complete the written survey that you may receive in the mail after your visit with us. Thank you!             Your Updated Medication List - Protect others around you: Learn how to safely use, store and throw  away your medicines at www.disposemymeds.org.          This list is accurate as of 11/8/18  2:16 PM.  Always use your most recent med list.                   Brand Name Dispense Instructions for use Diagnosis    acetaminophen 325 MG tablet    TYLENOL    100 tablet    Take 2 tablets (650 mg) by mouth every 6 hours as needed for mild pain    Donor of kidney for transplant       acetic acid 2 % otic solution    VOSOL     INSTILL 4 DROPS TO THE LEFT EAR- DAILY OR EVERY OTHER DAY        simvastatin 20 MG tablet    ZOCOR    90 tablet    Take 1 tablet (20 mg) by mouth At Bedtime    Hyperlipidemia LDL goal <100       triamcinolone 0.1 % ointment    KENALOG    80 g    Apply sparingly to affected area three times a day    Other eczema       VITAMIN D (CHOLECALCIFEROL) PO      Take 1,000 Units by mouth daily

## 2018-11-08 NOTE — PROGRESS NOTES
Suhail Mustafa is seen for a left ear check.  He reports that the ear still feels full and is a little itchy with some episodes of foul smelling otorrhea for which he uses drops.  He does use drops about once a week but will use it daily when the drainage occurs.  He uses his hearing aid daily and has not noticed a change in hearing.    Physical examination:  male in no acute distress.  Alert and answering questions appropriately.  HB 1/6 bilaterally.  Left ear examined under the microscope.  Moist cerumen suctioned from the ear canal and off the TM which was cultured as it is somewhat wet, PE tube in position inferiorly and open, a little moisture around the tube but no otorrhea, stable mild epitympanic retraction pocket.    Assessment and plan:  Stable ear.  We'll see him in 6 months.

## 2018-11-09 LAB
BACTERIA SPEC CULT: NORMAL
BACTERIA SPEC CULT: NORMAL
SPECIMEN SOURCE: NORMAL

## 2018-11-12 ENCOUNTER — PATIENT OUTREACH (OUTPATIENT)
Dept: OTOLARYNGOLOGY | Facility: CLINIC | Age: 48
End: 2018-11-12

## 2018-11-12 DIAGNOSIS — B37.9 YEAST INFECTION: Primary | ICD-10-CM

## 2018-11-12 RX ORDER — CLOTRIMAZOLE 1 G/ML
SOLUTION TOPICAL
Qty: 10 ML | Refills: 0 | Status: SHIPPED | OUTPATIENT
Start: 2018-11-12 | End: 2018-11-26

## 2018-11-12 NOTE — PATIENT INSTRUCTIONS
Spoke with pt regarding results of recent culture. Relayed per Dr. Rogel culture is showing yeast growth. We would like to treat this with clotrimazole drops for 3 weeks. We understand this has caused irritation in the past and recommend pt use the solution as long as he can tolerate. Pt states understanding and requests new prescription. Denies any questions at this time. Bhavna HAWTHORNE RNCC

## 2018-11-15 ENCOUNTER — ANESTHESIA EVENT (OUTPATIENT)
Dept: SURGERY | Facility: AMBULATORY SURGERY CENTER | Age: 48
End: 2018-11-15

## 2018-11-16 ENCOUNTER — HOSPITAL ENCOUNTER (OUTPATIENT)
Facility: AMBULATORY SURGERY CENTER | Age: 48
End: 2018-11-16
Attending: ORTHOPAEDIC SURGERY
Payer: COMMERCIAL

## 2018-11-16 ENCOUNTER — SURGERY (OUTPATIENT)
Age: 48
End: 2018-11-16

## 2018-11-16 ENCOUNTER — ANESTHESIA (OUTPATIENT)
Dept: SURGERY | Facility: AMBULATORY SURGERY CENTER | Age: 48
End: 2018-11-16

## 2018-11-16 VITALS
WEIGHT: 202 LBS | RESPIRATION RATE: 18 BRPM | SYSTOLIC BLOOD PRESSURE: 110 MMHG | HEART RATE: 66 BPM | TEMPERATURE: 98 F | BODY MASS INDEX: 28.28 KG/M2 | OXYGEN SATURATION: 100 % | DIASTOLIC BLOOD PRESSURE: 70 MMHG | HEIGHT: 71 IN

## 2018-11-16 DIAGNOSIS — E78.5 HYPERLIPIDEMIA LDL GOAL <100: ICD-10-CM

## 2018-11-16 DIAGNOSIS — M19.019 SHOULDER ARTHRITIS: Primary | ICD-10-CM

## 2018-11-16 LAB
CHOLEST SERPL-MCNC: 147 MG/DL
HDLC SERPL-MCNC: 41 MG/DL
LDLC SERPL CALC-MCNC: 81 MG/DL
NONHDLC SERPL-MCNC: 106 MG/DL
TRIGL SERPL-MCNC: 123 MG/DL

## 2018-11-16 PROCEDURE — 36415 COLL VENOUS BLD VENIPUNCTURE: CPT | Performed by: FAMILY MEDICINE

## 2018-11-16 PROCEDURE — 80061 LIPID PANEL: CPT | Performed by: FAMILY MEDICINE

## 2018-11-16 DEVICE — IMP ANCHOR ARTHREX BC SWVLK TENODESIS 6.25X19.1MM AR-1662BC: Type: IMPLANTABLE DEVICE | Site: SHOULDER | Status: FUNCTIONAL

## 2018-11-16 RX ORDER — ACETAMINOPHEN 325 MG/1
650 TABLET ORAL EVERY 4 HOURS PRN
Qty: 50 TABLET | Refills: 0 | Status: SHIPPED | OUTPATIENT
Start: 2018-11-16 | End: 2022-05-18

## 2018-11-16 RX ORDER — ONDANSETRON 4 MG/1
4 TABLET, ORALLY DISINTEGRATING ORAL
Status: DISCONTINUED | OUTPATIENT
Start: 2018-11-16 | End: 2018-11-17 | Stop reason: HOSPADM

## 2018-11-16 RX ORDER — ONDANSETRON 2 MG/ML
INJECTION INTRAMUSCULAR; INTRAVENOUS PRN
Status: DISCONTINUED | OUTPATIENT
Start: 2018-11-16 | End: 2018-11-16

## 2018-11-16 RX ORDER — OXYCODONE HYDROCHLORIDE 5 MG/1
5-10 TABLET ORAL EVERY 4 HOURS PRN
Qty: 40 TABLET | Refills: 0 | Status: SHIPPED | OUTPATIENT
Start: 2018-11-16 | End: 2019-06-05

## 2018-11-16 RX ORDER — NALOXONE HYDROCHLORIDE 0.4 MG/ML
.1-.4 INJECTION, SOLUTION INTRAMUSCULAR; INTRAVENOUS; SUBCUTANEOUS
Status: DISCONTINUED | OUTPATIENT
Start: 2018-11-16 | End: 2018-11-17 | Stop reason: HOSPADM

## 2018-11-16 RX ORDER — OXYCODONE HYDROCHLORIDE 5 MG/1
5 TABLET ORAL EVERY 4 HOURS PRN
Status: DISCONTINUED | OUTPATIENT
Start: 2018-11-16 | End: 2018-11-17 | Stop reason: HOSPADM

## 2018-11-16 RX ORDER — BUPIVACAINE HYDROCHLORIDE AND EPINEPHRINE 5; 5 MG/ML; UG/ML
INJECTION, SOLUTION PERINEURAL PRN
Status: DISCONTINUED | OUTPATIENT
Start: 2018-11-16 | End: 2018-11-16

## 2018-11-16 RX ORDER — SODIUM CHLORIDE, SODIUM LACTATE, POTASSIUM CHLORIDE, CALCIUM CHLORIDE 600; 310; 30; 20 MG/100ML; MG/100ML; MG/100ML; MG/100ML
INJECTION, SOLUTION INTRAVENOUS CONTINUOUS
Status: DISCONTINUED | OUTPATIENT
Start: 2018-11-16 | End: 2018-11-17 | Stop reason: HOSPADM

## 2018-11-16 RX ORDER — FLUMAZENIL 0.1 MG/ML
0.2 INJECTION, SOLUTION INTRAVENOUS
Status: DISCONTINUED | OUTPATIENT
Start: 2018-11-16 | End: 2018-11-16 | Stop reason: HOSPADM

## 2018-11-16 RX ORDER — MEPERIDINE HYDROCHLORIDE 25 MG/ML
12.5 INJECTION INTRAMUSCULAR; INTRAVENOUS; SUBCUTANEOUS
Status: DISCONTINUED | OUTPATIENT
Start: 2018-11-16 | End: 2018-11-17 | Stop reason: HOSPADM

## 2018-11-16 RX ORDER — ACETAMINOPHEN 325 MG/1
650 TABLET ORAL
Status: DISCONTINUED | OUTPATIENT
Start: 2018-11-16 | End: 2018-11-17 | Stop reason: HOSPADM

## 2018-11-16 RX ORDER — FENTANYL CITRATE 50 UG/ML
25-50 INJECTION, SOLUTION INTRAMUSCULAR; INTRAVENOUS
Status: DISCONTINUED | OUTPATIENT
Start: 2018-11-16 | End: 2018-11-16 | Stop reason: HOSPADM

## 2018-11-16 RX ORDER — ONDANSETRON 4 MG/1
4 TABLET, ORALLY DISINTEGRATING ORAL EVERY 30 MIN PRN
Status: DISCONTINUED | OUTPATIENT
Start: 2018-11-16 | End: 2018-11-17 | Stop reason: HOSPADM

## 2018-11-16 RX ORDER — OXYCODONE HYDROCHLORIDE 5 MG/1
5 TABLET ORAL
Status: DISCONTINUED | OUTPATIENT
Start: 2018-11-16 | End: 2018-11-17 | Stop reason: HOSPADM

## 2018-11-16 RX ORDER — NALOXONE HYDROCHLORIDE 0.4 MG/ML
.1-.4 INJECTION, SOLUTION INTRAMUSCULAR; INTRAVENOUS; SUBCUTANEOUS
Status: DISCONTINUED | OUTPATIENT
Start: 2018-11-16 | End: 2018-11-16 | Stop reason: HOSPADM

## 2018-11-16 RX ORDER — CEFAZOLIN SODIUM 2 G/50ML
2 SOLUTION INTRAVENOUS
Status: COMPLETED | OUTPATIENT
Start: 2018-11-16 | End: 2018-11-16

## 2018-11-16 RX ORDER — PROPOFOL 10 MG/ML
INJECTION, EMULSION INTRAVENOUS CONTINUOUS PRN
Status: DISCONTINUED | OUTPATIENT
Start: 2018-11-16 | End: 2018-11-16

## 2018-11-16 RX ORDER — GABAPENTIN 300 MG/1
300 CAPSULE ORAL ONCE
Status: COMPLETED | OUTPATIENT
Start: 2018-11-16 | End: 2018-11-16

## 2018-11-16 RX ORDER — ACETAMINOPHEN 325 MG/1
975 TABLET ORAL ONCE
Status: COMPLETED | OUTPATIENT
Start: 2018-11-16 | End: 2018-11-16

## 2018-11-16 RX ORDER — BUPIVACAINE HYDROCHLORIDE 2.5 MG/ML
INJECTION, SOLUTION EPIDURAL; INFILTRATION; INTRACAUDAL PRN
Status: DISCONTINUED | OUTPATIENT
Start: 2018-11-16 | End: 2018-11-16 | Stop reason: HOSPADM

## 2018-11-16 RX ORDER — FENTANYL CITRATE 50 UG/ML
25-50 INJECTION, SOLUTION INTRAMUSCULAR; INTRAVENOUS
Status: DISCONTINUED | OUTPATIENT
Start: 2018-11-16 | End: 2018-11-17 | Stop reason: HOSPADM

## 2018-11-16 RX ORDER — SODIUM CHLORIDE, SODIUM LACTATE, POTASSIUM CHLORIDE, CALCIUM CHLORIDE 600; 310; 30; 20 MG/100ML; MG/100ML; MG/100ML; MG/100ML
INJECTION, SOLUTION INTRAVENOUS CONTINUOUS
Status: DISCONTINUED | OUTPATIENT
Start: 2018-11-16 | End: 2018-11-16 | Stop reason: HOSPADM

## 2018-11-16 RX ORDER — DEXAMETHASONE SODIUM PHOSPHATE 4 MG/ML
INJECTION, SOLUTION INTRA-ARTICULAR; INTRALESIONAL; INTRAMUSCULAR; INTRAVENOUS; SOFT TISSUE PRN
Status: DISCONTINUED | OUTPATIENT
Start: 2018-11-16 | End: 2018-11-16

## 2018-11-16 RX ORDER — ONDANSETRON 2 MG/ML
4 INJECTION INTRAMUSCULAR; INTRAVENOUS EVERY 30 MIN PRN
Status: DISCONTINUED | OUTPATIENT
Start: 2018-11-16 | End: 2018-11-17 | Stop reason: HOSPADM

## 2018-11-16 RX ORDER — PROPOFOL 10 MG/ML
INJECTION, EMULSION INTRAVENOUS PRN
Status: DISCONTINUED | OUTPATIENT
Start: 2018-11-16 | End: 2018-11-16

## 2018-11-16 RX ORDER — AMOXICILLIN 250 MG
1-2 CAPSULE ORAL 2 TIMES DAILY
Qty: 30 TABLET | Refills: 0 | Status: SHIPPED | OUTPATIENT
Start: 2018-11-16 | End: 2019-06-05

## 2018-11-16 RX ORDER — CEFAZOLIN SODIUM 1 G/50ML
1 SOLUTION INTRAVENOUS SEE ADMIN INSTRUCTIONS
Status: DISCONTINUED | OUTPATIENT
Start: 2018-11-16 | End: 2018-11-16 | Stop reason: HOSPADM

## 2018-11-16 RX ADMIN — ACETAMINOPHEN 975 MG: 325 TABLET ORAL at 10:11

## 2018-11-16 RX ADMIN — PROPOFOL 250 MG: 10 INJECTION, EMULSION INTRAVENOUS at 12:07

## 2018-11-16 RX ADMIN — PROPOFOL 200 MCG/KG/MIN: 10 INJECTION, EMULSION INTRAVENOUS at 12:07

## 2018-11-16 RX ADMIN — BUPIVACAINE HYDROCHLORIDE AND EPINEPHRINE 10 ML: 5; 5 INJECTION, SOLUTION PERINEURAL at 11:23

## 2018-11-16 RX ADMIN — BUPIVACAINE HYDROCHLORIDE 10 ML: 2.5 INJECTION, SOLUTION EPIDURAL; INFILTRATION; INTRACAUDAL at 13:08

## 2018-11-16 RX ADMIN — CEFAZOLIN SODIUM 2 G: 2 SOLUTION INTRAVENOUS at 12:20

## 2018-11-16 RX ADMIN — DEXAMETHASONE SODIUM PHOSPHATE 4 MG: 4 INJECTION, SOLUTION INTRA-ARTICULAR; INTRALESIONAL; INTRAMUSCULAR; INTRAVENOUS; SOFT TISSUE at 12:17

## 2018-11-16 RX ADMIN — SODIUM CHLORIDE, SODIUM LACTATE, POTASSIUM CHLORIDE, CALCIUM CHLORIDE: 600; 310; 30; 20 INJECTION, SOLUTION INTRAVENOUS at 11:29

## 2018-11-16 RX ADMIN — FENTANYL CITRATE 50 MCG: 50 INJECTION, SOLUTION INTRAMUSCULAR; INTRAVENOUS at 11:20

## 2018-11-16 RX ADMIN — ONDANSETRON 4 MG: 2 INJECTION INTRAMUSCULAR; INTRAVENOUS at 13:28

## 2018-11-16 RX ADMIN — GABAPENTIN 300 MG: 300 CAPSULE ORAL at 10:11

## 2018-11-16 NOTE — BRIEF OP NOTE
[facility]    Orthopaedic Surgery  Brief Operative Note    Pre-operative diagnosis: Left Shoulder Glenohumeral Arthrosis, Labral Tear, Long Head Biceps Disease   Post-operative diagnosis * No post-op diagnosis entered *   Procedure: Procedure(s):  Left Shoulder Diagnostic Arthroscopy, Labral Debridement, Possible Chondroplasty, Open Biceps Tenodesis   Surgeon: Anai Mata MD   Assistants(s): None   Anesthesia: Other    Estimated blood loss: 15ml   Total IV fluids: (See anesthesia record)   Blood transfusion: (See anesthesia record)   Total urine output: (See anesthesia record)   Drains: None   Specimens: * No specimens in log *   Findings: Grade 4 change posterior-inferior glenoid, at least grade 2 change diffusely over humeral head, degenerate SLAP tear and ant and post labral tearing. Intact cuff throughout   Complications: None   Implants: Arthrex 6.25 BioSwivelLock x 1

## 2018-11-16 NOTE — ANESTHESIA POSTPROCEDURE EVALUATION
Anesthesia POST Procedure Evaluation    Patient: Suhail Mustafa   MRN:     [id number] Gender:   male   Age:    48 year old :      1970        Preoperative Diagnosis: Left Shoulder Glenohumeral Arthrosis, Labral Tear, Long Head Biceps Disease   Procedure(s):  Left Shoulder Diagnostic Arthroscopy, Labral Debridement, Open Biceps Tenodesis   Postop Comments: No value filed.       Anesthesia Type:  General    Reportable Event: NO     PAIN: Uncomplicated   Sign Out status: Comfortable, Well controlled pain     PONV: No PONV   Sign Out status:  No Nausea or Vomiting     Neuro/Psych: Uneventful perioperative course   Sign Out Status: Preoperative baseline; Age appropriate mentation     Airway/Resp.: Uneventful perioperative course   Sign Out Status: Non labored breathing, age appropriate RR; Resp. Status within EXPECTED Parameters     CV: Uneventful perioperative course   Sign Out status: Appropriate BP and perfusion indices; Appropriate HR/Rhythm     Disposition:   Sign Out in:  PACU  Disposition:  Phase II; Home  Recovery Course: Uneventful  Follow-Up: Not required           Last Anesthesia Record Vitals:  CRNA VITALS  2018 1310 - 2018 1410      2018             Pulse: 76    SpO2: 99 %          Last PACU/Preop Vitals:  Vitals:    18 1400 18 1405 18 1430   BP: 111/72 109/70 110/70   Pulse: 66 68 66   Resp:  18   Temp: 36.7  C (98  F) 36.7  C (98  F) 36.7  C (98  F)   SpO2: 100% 100% 100%         Electronically Signed By: Abdiaziz Ma MD, 2018, 3:19 PM

## 2018-11-16 NOTE — IP AVS SNAPSHOT
[facility]    [address] [place] [id number]    Phone:  [phone]    Fax:  [phone]                                       After Visit Summary   11/16/2018    Suhail Mustafa    MRN: [id number]           After Visit Summary Signature Page     I have received my discharge instructions, and my questions have been answered. I have discussed any challenges I see with this plan with the nurse or doctor.    ..........................................................................................................................................  Patient/Patient Representative Signature      ..........................................................................................................................................  Patient Representative Print Name and Relationship to Patient    ..................................................               ................................................  Date                                   Time    ..........................................................................................................................................  Reviewed by Signature/Title    ...................................................              ..............................................  Date                                               Time          22EPIC Rev 08/18

## 2018-11-16 NOTE — PROGRESS NOTES
Suhail,  These lipid values look great now. Please continue your same simvastatin medication.     To Whitt MD

## 2018-11-16 NOTE — OR NURSING
Patient received left side Interscalene nerve block  with Exparel.  Fentanyl 50mcg and Versed 1mg given. Tolerated procedure well.

## 2018-11-16 NOTE — ANESTHESIA PREPROCEDURE EVALUATION
Anesthesia Evaluation     . Pt has had prior anesthetic. Type of anesthetic: Grade 1 view with 1 attempt using Mac 3 blade per anesthesia record.    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 .    Neurologic:  - neg neurologic ROS     Cardiovascular:  - neg cardiovascular ROS   (+) ----. : . . . :. . Previous cardiac testing date:results:date: results:ECG reviewed date: results:6/26/15: Sinus bradycardia (HR 53) date: results:          METS/Exercise Tolerance:  >4 METS   Hematologic:  - neg hematologic  ROS       Musculoskeletal: Comment: Hip and Shoulder Dislocation        GI/Hepatic:  - neg GI/hepatic ROS       Renal/Genitourinary:  - ROS Renal section negative       Endo:  - neg endo ROS       Psychiatric:  - neg psychiatric ROS       Infectious Disease:  - neg infectious disease ROS       Malignancy:      - no malignancy   Other:                   Orders Only on 09/28/2015   Component Date Value Ref Range Status     Occult Blood Scn FIT 09/27/2015 Negative  NEG Final     Lab Results   Component Value Date    WBC 4.1 04/24/2018    WBC 4.1 10/12/2016    WBC 4.4 08/04/2016    HGB 14.2 04/24/2018    HGB 11.7 (L) 10/14/2016    HGB 12.3 (L) 10/13/2016    HCT 41.2 04/24/2018    HCT 33.8 (L) 10/14/2016    HCT 35.0 (L) 10/13/2016     04/24/2018     10/12/2016     08/04/2016     04/24/2018     10/12/2016     08/04/2016    POTASSIUM 4.1 04/24/2018    POTASSIUM 4.0 10/12/2016    POTASSIUM 4.2 08/04/2016    CHLORIDE 107 04/24/2018    CHLORIDE 105 10/12/2016    CHLORIDE 104 08/04/2016    CO2 28 04/24/2018    CO2 28 10/12/2016    CO2 28 08/04/2016    BUN 16 04/24/2018    BUN 14 10/14/2016    BUN 10 10/12/2016    CR 1.63 (H) 10/22/2018    CR 1.51 (H) 04/24/2018    CR 1.44 (H) 10/18/2017    GLC 99 04/24/2018    GLC 95 10/12/2016    GLC 93 08/04/2016    DD <0.20  04/05/2011    TROPONIN <0.01 04/05/2011    AST 24 04/24/2018    AST 23 08/04/2016    AST 20 06/26/2015    ALT 25 04/24/2018    ALT 35 08/04/2016    ALT 26 06/26/2015    ALKPHOS 51 08/04/2016    ALKPHOS 68 06/26/2015    BILITOTAL 0.6 08/04/2016    BILITOTAL 0.7 06/26/2015    INR 1.06 10/12/2016    INR 1.00 08/04/2016    INR 1.09 06/26/2015         Physical Exam  Normal systems: cardiovascular, pulmonary and dental    Airway   Mallampati: II  TM distance: >3 FB  Neck ROM: full    Dental     Cardiovascular   Rhythm and rate: regular and normal      Pulmonary    breath sounds clear to auscultation                    Anesthesia Plan      History & Physical Review      ASA Status:  2 .             Postoperative Care      Consents        Juli Lee (CA 1)      I interviewed and examined the patient, reviewed and updated the resident's note.    [place]  Staff Anesthesiologist  10:13 AM October 9, 2015                     .    JZG FV AN PHYSICAL EXAM    Assessment:   ASA SCORE: 2    NPO Status: > 6 hours since completed Solid Foods   Documentation: H&P complete; Preop Testing complete; Consents complete   Proceeding: Proceed without further delay  Tobacco Use:  NO Active use of Tobacco/UNKNOWN Tobacco use status     Plan:   Anes. Type:  General   Pre-Induction: Acetaminophen PO   Induction:  IV (Standard)   Airway: LMA   Access/Monitoring: PIV   Maintenance: Balanced   Emergence: Procedure Site   Logistics: Same Day Surgery     Postop Pain/Sedation Strategy:  Standard-Options: Opioids PRN; Exparel; Block SS     PONV Management:  Adult Risk Factors:, Non-Smoker, Postop Opioids  Prevention: Ondansetron     CONSENT: Direct conversation   Plan and risks discussed with: Patient   Blood Products: Consent Deferred (Minimal Blood Loss)

## 2018-11-16 NOTE — DISCHARGE INSTRUCTIONS
[facility]  Home Care Following Anesthesia  For 24 hours after surgery:  1. Get plenty of rest.  A responsible adult must stay with you for at least 24 hours after you leave the surgery center.  2. Do not drive or use heavy equipment.  If you have weakness or tingling, don't drive or use heavy equipment until this feeling goes away.   3. Do not drink alcohol.   4. Avoid strenuous or risky activities.  Ask for help when climbing stairs.  5. You may feel lightheaded.  IF so, sit for a few minutes before standing.  Have someone help you get up.   6. If you have nausea (feel sick to your stomach): Drink only clear liquids such as apple juice, ginger ale, broth or 7-Up.  Rest may also help.  Be sure to drink enough fluids.  Move to a regular diet as you feel able.   7. You may have a slight fever.  Call the doctor if your fever is over 100 F (37.7 C) (taken under the tongue) or lasts longer than 24 hours.  8. You may have a dry mouth, a sore throat, muscle aches or trouble sleeping. These should go away after 24 hours.  9. Do not make important or legal decisions.       Tips for taking pain medications  To get the best pain relief possible, remember these points:    Take pain medications as directed, before pain becomes severe.    Pain medication can upset your stomach: taking it with food may help.    Constipation is a common side effect of pain medication. Drink plenty of  fluids.    Eat foods high in fiber. Take a stool softener if recommended by your doctor or pharmacist.    Do not drink alcohol, drive or operate machinery while taking pain medications.    Ask about other ways to control pain, such as with heat, ice or relaxation.    Tylenol/Acetaminophen Consumption  To help encourage the safe use of acetaminophen, the makers of TYLENOL  have lowered the maximum daily dose for single-ingredient Extra Strength TYLENOL  (acetaminophen) products sold in the [place] from 8 pills per  "day (4,000 mg) to 6 pills per day (3,000 mg). The dosing interval has also changed from 2 pills every 4-6 hours to 2 pills every 6 hours.    If you feel your pain relief is insufficient, you may take Tylenol/Acetaminophen in addition to your narcotic pain medication.     Be careful not to exceed 3,000 mg of Tylenol/Acetaminophen in a 24 hour period from all sources.    If you are taking extra strength Tylenol/acetaminophen (500 mg), the maximum dose is 6 tablets in 24 hours.    If you are taking regular strength acetaminophen (325 mg), the maximum dose is 9 tablets in 24 hours.    Last dose of Tylenol:  975 mg at 10:15 am.  Next dose at 4:15 pm if needed.  Follow package instructions.    Call a doctor for any of the followin. Signs of infection (fever, growing tenderness at the surgery site, a large amount of drainage or bleeding, severe pain, foul-smelling drainage, redness, swelling).  2. It has been over 8 to 10 hours since surgery and you are still not able to urinate (pass water).  3. Headache for over 24 hours.  Your doctor is:  Dr. Anai Mata, Orthopaedics: [phone]                  Or dial [phone] and ask for the resident on call for:  Orthopaedics  For emergency care, call the:  [facility] Emergency Department: [phone] (TTY for hearing impaired: [phone])    Information about liposomal bupivacaine (Exparel)    What is Liposomal Bupivacaine?    Liposomal Bupivacaine is a numbing medication that can help you manage your pain after surgery.  This medication is similar to \"novacaine,\" which is often used by the dentist.  Liposomal bupivacaine is released slowly and can help control pain for up to 72 hours.    What is the purpose of Liposomal Bupivacaine?    To manage your pain after surgery    To help you sleep better, take deep breaths, walk more comfortable, and feel up to visiting with others    How is the procedure done?    Liposomal bupivacaine is a medication given by an " injection.    It is usually given right before your surgery.  If this is the case, you will be awake or sedated, but you should experience minimal pain during the procedure.    For some people, the injection may be given at the very end of your surgery.  It all depends on the type of surgery and your situation.    The procedure usually takes about 5-15 minutes.  An ultrasound machine will help the anesthesiologist insert it in the right place or the surgeon will inject it under direct vision.     A needle is used to place the numbing medication under your skin.  It provides pain relief by numbing the tissue in the area where your surgeon will make the incision.    What can I expect?    You may experience numbness, tingling, or a feeling of heaviness around the area that was injected.    If you experience any of the follow symptoms IMMEDIATELY CALL THE REGIONAL ANESTHESIA PAIN SERVICE:    Numbness or tingling occurs in areas other than around the injection site    Blurry vision    Ringing in your ears    A metallic taste in your mouth    PAGE: Dial [phone].  When prompted, enter the following 4-digit ID number:  [id number].  You will be prompted to enter your phone number; and then enter the # sign.  The clinician on call will call you back.    OR    CALL: Dial [phone].  Let the hospital  know that you are having a problem with a nerve block and that you would like to speak to the regional anesthesia pain service right away.    You should not receive any other type of numbing medication within 4 days after receiving liposomal bupivacaine unless your anesthesiologist approves.      Post Operative Instructions: Regional Anesthetic for Upper Extremity with Liposomal Bupivacaine  General Information:   Regional anesthesia is when local anesthetic or  numbing  medication is injected around the nerves to anesthetize or  numb  the area supplied by that set of nerves. It is a type of analgesia used to control  pain and decreases the need for narcotics following surgery.    Types of Regional Blocks:  Interscalene: A block injected into the neck on the operative shoulder/arm of a patient having shoulder surgery  Supraclavicular: A block injected near the clavicle on the operative shoulder of a patient having elbow, forearm, or hand surgery    Procedure:  The type of anesthesia your doctor used to numb your shoulder or arm will usually not start to wear off for 24-48 hours, but may last as long as 72 hours. You should be careful during that period, since it is possible to injure your arm without being aware of the injury. While your arm is numb, you should:    Avoid striking or bumping your arm    Avoid extreme hot or cold    Diet:  There are no restrictions on your diet. You should drink plenty of fluids.     Discomfort:  You will have a tingling and prickly sensation in your arm as the feeling begins to return. You can also expect some discomfort. The amount of discomfort is unpredictable, but if you have more pain than can be controlled with pain medication you should notify your physician.     Pain Medications:  Begin taking your oral pain pills before bedtime and during the night to avoid a sudden onset of pain as part of the block wears off.  Do not engage in drinking, driving, or hazardous occupations while taking pain medication.     Stitches:   You may have stitches or special skin closures. You doctor will inform you when to return to the office to have them removed.     Activity:  On the day of surgery you should try to stay in bed with your hand elevated on pillows. You may resume your normal activity after that, wearing a sling for comfort. Contact your physician if you have any of the problems:     Continued numbness or tingling in the arm or hand after 72 hours    Swelling of the fingers or fingers that are cold to the touch    Excessive bleeding or drainage    Severe pain

## 2018-11-16 NOTE — IP AVS SNAPSHOT
MRN:[id number]                      After Visit Summary   11/16/2018    Suhail Mustafa    MRN: [id number]           Thank you!     Thank you for choosing [facility] for your care. Our goal is always to provide you with excellent care. Hearing back from our patients is one way we can continue to improve our services. Please take a few minutes to complete the written survey that you may receive in the mail after you visit with us. Thank you!        Patient Information     Date Of Birth          1970        About your hospital stay     You were admitted on:  November 16, 2018 [facility]    You were discharged on:  November 16, 2018       Who to Call     For medical emergencies, please call 911.  For non-urgent questions about your medical care, please call your primary care provider or clinic, [phone]  For questions related to your surgery, please call your surgery clinic        Attending Provider     Provider Specialty    Anai Mata MD Orthopedics       Primary Care Provider Office Phone # Fax #    To Whitt [phone]      After Care Instructions      Diet as Tolerated       Return to diet before surgery, unless instructed otherwise.            Discharge Instructions       Review outpatient procedure discharge instructions with patient as directed by Provider            Ice to affected area       Ice pack to surgical site every 15 minutes per hour for 24 hours            No weight bearing       Patient should do active finger, hand, wrist motion. Encourage gentle elbow flexion and extension. May do forearm rotation (pronation/supination). No elbow flexion against resistance. Nothing heavier than a coffee cup in surgical hand.  May begin pendulum exercises after 3 days.            Notify Provider       For signs and symptoms of infection: Fever greater than 101, redness, drainage.  During business  hours call [phone] with questions.  On evenings or weekends call the [facility] service on call at [phone] (option 4).  Note that medication refills are only filled during business hours and will not be filled evenings or weekends.            Remove dressing - at 72 hours        After 5 days may let water run over incisions. Do not soak in tub or pool for 4 weeks.            Return to clinic       Return to clinic in 2 weeks            Wound care       Do not immerse wound in water until sutures removed                  Your next 10 appointments already scheduled     Nov 28, 2018  2:30 PM CST   (Arrive by 2:15 PM)   RETURN SHOULDER with Anai Mata MD   [facility] ([facility] [place])    [address] [place] [id number] [phone]            Dec 28, 2018 10:30 AM CST   (Arrive by 10:15 AM)   RETURN SHOULDER with Anai Mata MD   [facility] ([facility] [place])    [address] [place] [id number] [phone]              Further instructions from your care team       [facility]  Home Care Following Anesthesia  For 24 hours after surgery:  1. Get plenty of rest.  A responsible adult must stay with you for at least 24 hours after you leave the surgery center.  2. Do not drive or use heavy equipment.  If you have weakness or tingling, don't drive or use heavy equipment until this feeling goes away.   3. Do not drink alcohol.   4. Avoid strenuous or risky activities.  Ask for help when climbing stairs.  5. You may feel lightheaded.  IF so, sit for a few minutes before standing.  Have someone help you get up.   6. If you have nausea (feel sick to your stomach): Drink only clear liquids such as apple juice, ginger ale, broth or 7-Up.  Rest may also help.  Be sure to drink enough fluids.  Move to a regular diet as  you feel able.   7. You may have a slight fever.  Call the doctor if your fever is over 100 F (37.7 C) (taken under the tongue) or lasts longer than 24 hours.  8. You may have a dry mouth, a sore throat, muscle aches or trouble sleeping. These should go away after 24 hours.  9. Do not make important or legal decisions.       Tips for taking pain medications  To get the best pain relief possible, remember these points:    Take pain medications as directed, before pain becomes severe.    Pain medication can upset your stomach: taking it with food may help.    Constipation is a common side effect of pain medication. Drink plenty of  fluids.    Eat foods high in fiber. Take a stool softener if recommended by your doctor or pharmacist.    Do not drink alcohol, drive or operate machinery while taking pain medications.    Ask about other ways to control pain, such as with heat, ice or relaxation.    Tylenol/Acetaminophen Consumption  To help encourage the safe use of acetaminophen, the makers of TYLENOL  have lowered the maximum daily dose for single-ingredient Extra Strength TYLENOL  (acetaminophen) products sold in the [place] from 8 pills per day (4,000 mg) to 6 pills per day (3,000 mg). The dosing interval has also changed from 2 pills every 4-6 hours to 2 pills every 6 hours.    If you feel your pain relief is insufficient, you may take Tylenol/Acetaminophen in addition to your narcotic pain medication.     Be careful not to exceed 3,000 mg of Tylenol/Acetaminophen in a 24 hour period from all sources.    If you are taking extra strength Tylenol/acetaminophen (500 mg), the maximum dose is 6 tablets in 24 hours.    If you are taking regular strength acetaminophen (325 mg), the maximum dose is 9 tablets in 24 hours.    Last dose of Tylenol:  975 mg at 10:15 am.  Next dose at 4:15 pm if needed.  Follow package instructions.    Call a doctor for any of the followin. Signs of infection (fever, growing tenderness at the  "surgery site, a large amount of drainage or bleeding, severe pain, foul-smelling drainage, redness, swelling).  2. It has been over 8 to 10 hours since surgery and you are still not able to urinate (pass water).  3. Headache for over 24 hours.  Your doctor is:  Dr. Anai Mata, Orthopaedics: [phone]                  Or dial [phone] and ask for the resident on call for:  Orthopaedics  For emergency care, call the:  [facility] Emergency Department: [phone] (TTY for hearing impaired: [phone])    Information about liposomal bupivacaine (Exparel)    What is Liposomal Bupivacaine?    Liposomal Bupivacaine is a numbing medication that can help you manage your pain after surgery.  This medication is similar to \"novacaine,\" which is often used by the dentist.  Liposomal bupivacaine is released slowly and can help control pain for up to 72 hours.    What is the purpose of Liposomal Bupivacaine?    To manage your pain after surgery    To help you sleep better, take deep breaths, walk more comfortable, and feel up to visiting with others    How is the procedure done?    Liposomal bupivacaine is a medication given by an injection.    It is usually given right before your surgery.  If this is the case, you will be awake or sedated, but you should experience minimal pain during the procedure.    For some people, the injection may be given at the very end of your surgery.  It all depends on the type of surgery and your situation.    The procedure usually takes about 5-15 minutes.  An ultrasound machine will help the anesthesiologist insert it in the right place or the surgeon will inject it under direct vision.     A needle is used to place the numbing medication under your skin.  It provides pain relief by numbing the tissue in the area where your surgeon will make the incision.    What can I expect?    You may experience numbness, tingling, or a feeling of heaviness around the area that was injected.    If " you experience any of the follow symptoms IMMEDIATELY CALL THE REGIONAL ANESTHESIA PAIN SERVICE:    Numbness or tingling occurs in areas other than around the injection site    Blurry vision    Ringing in your ears    A metallic taste in your mouth    PAGE: Dial [phone].  When prompted, enter the following 4-digit ID number:  [id number].  You will be prompted to enter your phone number; and then enter the # sign.  The clinician on call will call you back.    OR    CALL: Dial [phone].  Let the hospital  know that you are having a problem with a nerve block and that you would like to speak to the regional anesthesia pain service right away.    You should not receive any other type of numbing medication within 4 days after receiving liposomal bupivacaine unless your anesthesiologist approves.      Post Operative Instructions: Regional Anesthetic for Upper Extremity with Liposomal Bupivacaine  General Information:   Regional anesthesia is when local anesthetic or  numbing  medication is injected around the nerves to anesthetize or  numb  the area supplied by that set of nerves. It is a type of analgesia used to control pain and decreases the need for narcotics following surgery.    Types of Regional Blocks:  Interscalene: A block injected into the neck on the operative shoulder/arm of a patient having shoulder surgery  Supraclavicular: A block injected near the clavicle on the operative shoulder of a patient having elbow, forearm, or hand surgery    Procedure:  The type of anesthesia your doctor used to numb your shoulder or arm will usually not start to wear off for 24-48 hours, but may last as long as 72 hours. You should be careful during that period, since it is possible to injure your arm without being aware of the injury. While your arm is numb, you should:    Avoid striking or bumping your arm    Avoid extreme hot or cold    Diet:  There are no restrictions on your diet. You should drink plenty of  "fluids.     Discomfort:  You will have a tingling and prickly sensation in your arm as the feeling begins to return. You can also expect some discomfort. The amount of discomfort is unpredictable, but if you have more pain than can be controlled with pain medication you should notify your physician.     Pain Medications:  Begin taking your oral pain pills before bedtime and during the night to avoid a sudden onset of pain as part of the block wears off.  Do not engage in drinking, driving, or hazardous occupations while taking pain medication.     Stitches:   You may have stitches or special skin closures. You doctor will inform you when to return to the office to have them removed.     Activity:  On the day of surgery you should try to stay in bed with your hand elevated on pillows. You may resume your normal activity after that, wearing a sling for comfort. Contact your physician if you have any of the problems:     Continued numbness or tingling in the arm or hand after 72 hours    Swelling of the fingers or fingers that are cold to the touch    Excessive bleeding or drainage    Severe pain      Pending Results     No orders found from 11/14/2018 to 11/17/2018.            Admission Information     Date & Time Provider Department Dept. Phone    11/16/2018 Anai Mata MD [facility] [phone]      Your Vitals Were     Blood Pressure Temperature Respirations Height Weight Pulse Oximetry    122/86 (Cuff Size: Adult Regular) 98  F (36.7  C) (Temporal) 18 1.803 m (5' 11\") 91.6 kg (202 lb) 100%    BMI (Body Mass Index)                   28.17 kg/m2           [employer] Information     [employer] gives you secure access to your electronic health record. If you see a primary care provider, you can also send messages to your care team and make appointments. If you have questions, please call your primary care clinic.  If you do not have a primary care provider, please call [phone] " and they will assist you.      [employer] is an electronic gateway that provides easy, online access to your medical records. With [employer], you can request a clinic appointment, read your test results, renew a prescription or communicate with your care team.     To access your existing account, please contact your [facility] or call [phone] for assistance.        Care EveryWhere ID     This is your Care EveryWhere ID. This could be used by other organizations to access your [facility] medical records  [id number]        Equal Access to Services     ALEXANDER RDZ : Hadii alo hayes hadasho Soomaali, waaxda luqadaha, qaybta kaalmada adeegyada, dixie pimentel. So [facility] [phone].    ATENCIÓN: Si habla español, tiene a martin disposición servicios gratuitos de asistencia lingüística. Llame al [phone].    We comply with applicable federal civil rights laws and [place] laws. We do not discriminate on the basis of race, color, national origin, age, disability, sex, sexual orientation, or gender identity.               Review of your medicines      START taking        Dose / Directions    oxyCODONE IR 5 MG tablet   Commonly known as:  ROXICODONE   Used for:  Shoulder arthritis        Dose:  5-10 mg   Take 1-2 tablets (5-10 mg) by mouth every 4 hours as needed for moderate to severe pain   Quantity:  40 tablet   Refills:  0       senna-docusate 8.6-50 MG per tablet   Commonly known as:  SENOKOT-S;PERICOLACE   Used for:  Shoulder arthritis        Dose:  1-2 tablet   Take 1-2 tablets by mouth 2 times daily   Quantity:  30 tablet   Refills:  0         CONTINUE these medicines which may have CHANGED, or have new prescriptions. If we are uncertain of the size of tablets/capsules you have at home, strength may be listed as something that might have changed.        Dose / Directions    acetaminophen 325 MG tablet   Commonly known as:  TYLENOL   This may have changed:  when  to take this   Used for:  Shoulder arthritis        Dose:  650 mg   Take 2 tablets (650 mg) by mouth every 4 hours as needed for mild pain   Quantity:  50 tablet   Refills:  0         CONTINUE these medicines which have NOT CHANGED        Dose / Directions    acetic acid 2 % otic solution   Commonly known as:  VOSOL        INSTILL 4 DROPS TO THE LEFT EAR- DAILY OR EVERY OTHER DAY   Refills:  11       clotrimazole 1 % solution   Commonly known as:  LOTRIMIN   Used for:  Yeast infection        Instill 5 drops in the left ear BID for 21 days   Quantity:  10 mL   Refills:  0       IRON SUPPLEMENT PO        Refills:  0       simvastatin 20 MG tablet   Commonly known as:  ZOCOR   Used for:  Hyperlipidemia LDL goal <100        Dose:  20 mg   Take 1 tablet (20 mg) by mouth At Bedtime   Quantity:  90 tablet   Refills:  3       triamcinolone 0.1 % ointment   Commonly known as:  KENALOG   Used for:  Other eczema        Apply sparingly to affected area three times a day   Quantity:  80 g   Refills:  2       VITAMIN D (CHOLECALCIFEROL) PO        Dose:  1000 Units   Take 1,000 Units by mouth daily   Refills:  0            Where to get your medicines      These medications were sent to [address] [address]    Hours:  TRANSPLANT PHONE NUMBER [phone] Phone:  [phone]     acetaminophen 325 MG tablet    senna-docusate 8.6-50 MG per tablet         Some of these will need a paper prescription and others can be bought over the counter. Ask your nurse if you have questions.     Bring a paper prescription for each of these medications     oxyCODONE IR 5 MG tablet                Protect others around you: Learn how to safely use, store and throw away your medicines at www.disposemymeds.org.        Information about your nerve block     Today you received a block to numb the nerves near your surgery site.    This is a  "block using local anesthetic or \"numbing\" medication injected around the nerves to anesthetize or \"numb\" the area supplied by those nerves. This block is injected into the muscle layer near your surgical site. The type of anesthesia (Exparel) your anesthesia team used to numb your abdomen may give you relief for up to 72 hours.     Diet: There are no diet restrictions, but you should drink plenty of fluids, unless you are on a fluid-restricted diet.     Activity: If your surgical site is an arm or leg you should be careful with your affected limb, since it is possible to injure your limb without being aware of it due to the numbing. Until full feeling returns, you should guard against bumping or hitting your limb, and avoid extreme hot or cold temperatures on the skin.    Pain Medication: As the block wears off, the feeling will return as a tingling or prickly sensation near your surgical site. You will experience more discomfort from your incisions as the feeling returns. You may want to take a pain pill (a narcotic or Tylenol if this was prescribed by your surgeon) when you start to experience mild pain, before the pain becomes more severe. If your pain medications do not control your pain, you should notify your surgeon. If you are taking narcotics for pain management, do not drink alcohol, drive a car, or perform hazardous activities.  If you have questions or concerns you may call your surgeon at the number provided with your discharge instructions.     Call your surgeon if you experience blurry vision, ringing in the ears or metallic taste in your mouth.         Information about OPIOIDS     PRESCRIPTION OPIOIDS: WHAT YOU NEED TO KNOW   We gave you an opioid (narcotic) pain medicine. It is important to manage your pain, but opioids are not always the best choice. You should first try all the other options your care team gave you. Take this medicine for as short a time (and as few doses) as possible.    Some " activities can increase your pain, such as bandage changes or therapy sessions. It may help to take your pain medicine 30 to 60 minutes before these activities. Reduce your stress by getting enough sleep, working on hobbies you enjoy and practicing relaxation or meditation. Talk to your care team about ways to manage your pain beyond prescription opioids.    These medicines have risks:    DO NOT drive when on new or higher doses of pain medicine. These medicines can affect your alertness and reaction times, and you could be arrested for driving under the influence (DUI). If you need to use opioids long-term, talk to your care team about driving.    DO NOT operate heavy machinery    DO NOT do any other dangerous activities while taking these medicines.    DO NOT drink any alcohol while taking these medicines.     If the opioid prescribed includes acetaminophen, DO NOT take with any other medicines that contain acetaminophen. Read all labels carefully. Look for the word  acetaminophen  or  Tylenol.  Ask your pharmacist if you have questions or are unsure.    You can get addicted to pain medicines, especially if you have a history of addiction (chemical, alcohol or substance dependence). Talk to your care team about ways to reduce this risk.    All opioids tend to cause constipation. Drink plenty of water and eat foods that have a lot of fiber, such as fruits, vegetables, prune juice, apple juice and high-fiber cereal. Take a laxative (Miralax, milk of magnesia, Colace, Senna) if you don t move your bowels at least every other day. Other side effects include upset stomach, sleepiness, dizziness, throwing up, tolerance (needing more of the medicine to have the same effect), physical dependence and slowed breathing.    Store your pills in a secure place, locked if possible. We will not replace any lost or stolen medicine. If you don t finish your medicine, please throw away (dispose) as directed by your pharmacist. The  [place] Pollution Control Agency has more information about safe disposal: https://www.pca.[facility].mn.us/living-green/managing-unwanted-medications             Medication List: This is a list of all your medications and when to take them. Check marks below indicate your daily home schedule. Keep this list as a reference.      Medications           Morning Afternoon Evening Bedtime As Needed    acetaminophen 325 MG tablet   Commonly known as:  TYLENOL   Take 2 tablets (650 mg) by mouth every 4 hours as needed for mild pain   Last time this was given:  975 mg on 11/16/2018 10:11 AM                                acetic acid 2 % otic solution   Commonly known as:  VOSOL   INSTILL 4 DROPS TO THE LEFT EAR- DAILY OR EVERY OTHER DAY                                clotrimazole 1 % solution   Commonly known as:  LOTRIMIN   Instill 5 drops in the left ear BID for 21 days                                IRON SUPPLEMENT PO                                oxyCODONE IR 5 MG tablet   Commonly known as:  ROXICODONE   Take 1-2 tablets (5-10 mg) by mouth every 4 hours as needed for moderate to severe pain                                senna-docusate 8.6-50 MG per tablet   Commonly known as:  SENOKOT-S;PERICOLACE   Take 1-2 tablets by mouth 2 times daily                                simvastatin 20 MG tablet   Commonly known as:  ZOCOR   Take 1 tablet (20 mg) by mouth At Bedtime                                triamcinolone 0.1 % ointment   Commonly known as:  KENALOG   Apply sparingly to affected area three times a day                                VITAMIN D (CHOLECALCIFEROL) PO   Take 1,000 Units by mouth daily

## 2018-11-16 NOTE — ANESTHESIA CARE TRANSFER NOTE
Patient: Suhail Mustafa    Procedure(s):  Left Shoulder Diagnostic Arthroscopy, Labral Debridement, Open Biceps Tenodesis    Diagnosis: Left Shoulder Glenohumeral Arthrosis, Labral Tear, Long Head Biceps Disease  Diagnosis Additional Information: No value filed.    Anesthesia Type:   No value filed.     Note:  Airway :Face Mask  Patient transferred to:PACU  Comments: VSS/WNL. Responds to strong stimuli.Handoff Report: Identifed the Patient, Identified the Reponsible Provider, Reviewed the pertinent medical history, Discussed the surgical course, Reviewed Intra-OP anesthesia mangement and issues during anesthesia, Set expectations for post-procedure period and Allowed opportunity for questions and acknowledgement of understanding      Vitals: (Last set prior to Anesthesia Care Transfer)    CRNA VITALS  11/16/2018 1310 - 11/16/2018 1345      11/16/2018             Pulse: 76    SpO2: 99 %                Electronically Signed By: FREEDOM Brewer CRNA  November 16, 2018  1:45 PM

## 2018-11-16 NOTE — ANESTHESIA PROCEDURE NOTES
Peripheral Nerve Block Procedure Note    Staff:     Anesthesiologist:  AV WHITMAN    Resident/CRNA:  JESSICA WILDE    Block performed by resident/CRNA in the presence of a teaching physician    Location: Pre-op  Procedure Start/Stop TImes:      11/16/2018 11:20 AM     11/16/2018 11:25 AM    patient identified, IV checked, site marked, risks and benefits discussed, informed consent, monitors and equipment checked, pre-op evaluation, at physician/surgeon's request and post-op pain management      Correct Patient: Yes      Correct Position: Yes      Correct Site: Yes      Correct Procedure: Yes      Correct Laterality:  Yes    Site Marked:  Yes  Procedure details:     Procedure:  Interscalene    ASA:  2    Diagnosis:  Post operative pain control    Laterality:  Left    Position:  Supine    Sterile Prep: chloraprep, mask and sterile gloves      Needle gauge:  21    Needle length (mm):  110    Ultrasound: Yes      Ultrasound used to identify targeted nerve, plexus, or vascular structure and placed a needle adjacent to it      Permanent Image entered into patiient's record      Abnormal pain on injection: No      Blood Aspirated: No      Paresthesias:  No    Bleeding at site: No      Bolus via:  Needle    Infusion Method:  Single Shot    Complications:  None  Assessment/Narrative:     Injection made incrementally with aspirations every (mL):  5

## 2018-11-19 NOTE — OP NOTE
DATE OF PROCEDURE: 11/16/2018     PREOPERATIVE DIAGNOSES:   1. Left glenohumeral arthrosis  2. Left degenerative labral tear   3. Left long head biceps disease     POSTOPERATIVE DIAGNOSES:   1. Left glenohumeral arthrosis  2. Left degenerative labral tear   3. Left long head biceps disease      PROCEDURES:   1. Left arthroscopic glenohumeral debridement, extensive  2. Left open biceps tenodesis    STAFF SURGEON: Anai Mata MD   ASSISTANT: None    ANESTHESIA: General endotracheal anesthesia with supplementary interscalene block.   ESTIMATED BLOOD LOSS: 5 mL.     IMPLANTS: Arthrex 6.25 Bio-SwiveLock x 1.   COMPLICATIONS: None.     BRIEF PATIENT HISTORY: Suhail Mustafa is a 48 year old male I have seen in clinic. Please refer to that documentation. He has an MRI which demonstrated glenohumeral arthrosis, degenerative labral tearing, and he had shoulder pain and symptoms referable to both the arthrosis and biceps disease. The patient has had nonoperative management but he has not had adequate lasting relief of pain and is at this time having lifestyle limiting symptoms. He wishes at this time to proceed with surgical intervention. We had discussed the risks and benefits of both non-operative and surgical management. We discussed the expected postoperative restrictions. We discussed the risks of surgery including risk of being no better or even worse if he  became stiff, infected, had an injury to the nerves or arteries which power the arm or hand or had a reaction to anesthesia. We discussed the postoperative rehabilitation course. The patient wished to proceed with surgery and informed consent was completed.    DESCRIPTION OF PROCEDURE: The patient was identified in the preoperative area and the correct left shoulder was marked for surgery. The patient was provided an interscalene block by our Anesthesia colleagues and was taken to the operating room where he was surrendered to general endotracheal  anesthesia. The patient was moved to the operating table in the beachchair position with all bony prominences well padded. The head was placed in a head marie with the neck in neutral position. The left upper extremity was prepped and draped in the usual sterile fashion. A timeout was held in accordance with hospital policy, confirming correct patient, side, site, procedure and administration of IV antibiotics prior to incision.   I initiated shoulder arthroscopy through a posterior viewing portal. I created an anterior working portal under direct visualization. I performed a diagnostic arthroscopy. Most notably there was grade 3 change diffusely over the humeral head (sparing the most peripheral rim) and grade 3 (spottygrade 4) changes on the posterior-inferior glenoid. There was diffuse degenerative labral tearing. The long head of the biceps was intact but with tenosynovitis down into the groove. The subscap, supra, infra, and teres were intact.   I tenotomized the long head of the biceps and allowed it to retract out of the shoulder for later tenodesis. I debrided the stump back to a stable edge. I debrided the anterior, superior, and posterior labrum to a stable rim.    All instruments were removed from the shoulder and then portals were closed with interrupted 3-0 Monocryl. Next I moved to the open tenodesis.  I made a longitudinal incision near the axilla centered over the inferior edge of the pectoralis. I dissected down to the pec and came underneath the muscle where I identified the long head of the biceps. This tendon was delivered into the wound and starting at the musculotendinous junction I placed a stitch with FiberLoop extending 2 cm down the tendon. I removed the remaining proximal tendon. I then exposed the site for tenodesis beneath the pectoralis below the biceps groove. I placed a shavonne retractor and long thin rich retractor to safely expose the site. I drilled a 6.5 tunnel and used the 6.25  SwivelLock to deliver the tendon into the tunnel. I anchored the SwivelLock with good purchase and appropriate tension on the tendon. The sutures were cut flush. I irrigated the wound. I infiltrated the open wound with 10ml 1.3% liposomal bupivacaine and 10ml 0.25% plain bupivacaine. I closed the wound with layered monocryl closure. Steri-Strips and a soft sterile dressing were applied. The arm was placed into an abduction sling and the patient was extubated and transported to the recovery room in stable condition. There were no apparent intraoperative complications.     POSTOPERATIVE PLAN:   1. The patient will be discharged to home when he meets Same Day discharge criteria.   2. The patient will have no range of motion restrictions and can do active hand, wrist and elbow range of motion. He can do shoulder pendulums as soon as the block wears off.  3. On or by 2 weeks, the patient will start formal passive and active assisted range of motion and progress to active range of motion as tolerated. He will discontinue the sling at 4 weeks. He will have no lifting more than 5 pounds for the first month and no more than 10 pounds in the second month.    CALVIN MUÑOZ MD

## 2018-11-20 ENCOUNTER — VIRTUAL VISIT (OUTPATIENT)
Dept: ANESTHESIOLOGY | Facility: CLINIC | Age: 48
End: 2018-11-20

## 2018-11-20 NOTE — OR NURSING
During post op call patient voiced some concern about thumb and pointer finger residual numbness after nerve block,  Anesthesia notified.  Dr. Benito to call patient back.

## 2018-11-20 NOTE — PROGRESS NOTES
Phone Encounter:    Patient noted persistent thumb and index finger tingling following surgery with interscalene block 4 days ago.  Interscalene block included liposomal encapsulated bupivicaine.  The patient reported resolution of numbness in rest of UE approximately 48 hours ago.  There has been no change in the quality or distribution of the persistent tingling since that time.  He denies pain, weakness, or inability to move hand or wrist.  He has needed only acetaminophen and ice for shoulder discomfort, no narcotics for almost 48 hours.    Assessment:  Persistent numbness in the distribution of median nerve.  Differential diagnosis includes prolonged effect of liposomal encapsulated bupivicaine versus neruopraxia.  Patient instructed to come to ED if symptoms worsen, he develops pain or weakness.   He was instructed that persistent numbness from either source generally resolves within a short period of time and full resolution is expected.  He is to call back if symptoms persist.  Additionally notice was sent to attending anesthesiologist requesting he contact patient also.    Danny Benito MD

## 2018-11-21 ENCOUNTER — DOCUMENTATION ONLY (OUTPATIENT)
Dept: ORTHOPEDICS | Facility: CLINIC | Age: 48
End: 2018-11-21

## 2018-11-21 NOTE — PROGRESS NOTES
Adolfo, MD Thong May Darrell Wayne, MD; Abdiaziz Ma MD Cc: Aga Cabral, RN                   I would not have him do anything but monitor (and protect his hand from extremes of cold/hot). Some (a very small number) patients have numbness/tingling beyond the 72 hour time frame most are given.            Previous Messages       ----- Message -----      From: Danny Benito MD      Sent: 11/20/2018   1:49 PM        To: Anai Mata MD, *   Subject: Persistent post operative neuro changes           Mr Mustafa had shoulder arthroscopy with interscalene block 11/16.  He has contacted ASC with persistent thumb and index finger numbness but reports no pain, weakness, immobility of wrist, hand or fingers.   This may represent prolonged effect of Exparel and/or neuropraxia.  I reassured him that resolution is expected but have asked him to call or come to clinic/ED if he develops pain, weakness, or more severe numbness.  Would you like anything else to be done at this time?     Danny Benito

## 2018-11-26 ENCOUNTER — TELEPHONE (OUTPATIENT)
Dept: OTOLARYNGOLOGY | Facility: CLINIC | Age: 48
End: 2018-11-26

## 2018-11-28 ENCOUNTER — OFFICE VISIT (OUTPATIENT)
Dept: ORTHOPEDICS | Facility: CLINIC | Age: 48
End: 2018-11-28
Payer: COMMERCIAL

## 2018-11-28 DIAGNOSIS — S43.432D LABRAL TEAR OF SHOULDER, LEFT, SUBSEQUENT ENCOUNTER: Primary | ICD-10-CM

## 2018-11-28 NOTE — NURSING NOTE
Reason For Visit:   Chief Complaint   Patient presents with     Surgical Followup     2 week pop left shoulder glenbohumeral debridement and biceps tenodesis.  DOS: 11/16/2018       PCP: To Whitt  Ref: Dr. To Callaway     ?  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  Work status?  Full time.  Date of injury: 1990s  Type of injury: dislocated his shoulder playing boot hockey.  Pain started to increase about 3 years ago.  Date of surgery: NA  Type of surgery: NA.  Smoker: No  Request smoking cessation information: No     Right hand dominant    SANE score  Affected shoulder: Left  Right shoulder SANE: 100  Left shoulder SANE: 50    There were no vitals taken for this visit.      Pain Assessment  Patient Currently in Pain: Yes  0-10 Pain Scale:  (4-5)  Primary Pain Location: Shoulder  Pain Orientation: Left  Pain Descriptors: Throbbing  Alleviating Factors: Ice, Pain medication (tylenol)  Aggravating Factors: Movement    Jewels Corbett, ATC

## 2018-11-28 NOTE — PROGRESS NOTES
CHIEF COMPLAINT: 2 weeks status post left arthroscopic extensive glenohumeral debridement and open biceps tenodesis  DATE OF SURGERY: 11/16/18    HISTORY OF PRESENT ILLNESS: Suhail Mustafa is an extremely pleasant RHD 48 year-old male who is 2 weeks status post the above procedure.  The patient reports that he has been doing well and pain has been well controlled.  He is currently taking Tylenol during the day and oxycodone only at night.  The numbness from the block lasted for 6 days but he does not have numbness or tingling.  He has had irritation to his medial elbow from a seam in the shoulder immobilizer.  No issues with wound healing or drainage.  No fevers or chills.    EXAM:  Pleasant adult male in NAD  Respirations even and unlabored.  Left upper extremity: Incisions well approximated, clean, dry, and intact.  Steri-Strips were removed today.  Distally neurovascularly intact without deficits. Shoulder range of motion not tested due to postop restrictions.    ASSESSMENT:  1.  2 weeks status post above procedure    PLAN:  The patient's is progressing as expected postoperatively and arthroscopic imaging was reviewed today.  We will have him continue with left shoulder pendulums and start table slides wall blocks.  Did encourage elbow and hand range of motion.  He will stay in the sling for 2 more weeks however he can come out of the sling if the sling continues to be irritating to his arm.  We will have him start physical therapy with [facility] in [place] at 4 weeks postop.  He may work on P/AA/AROM as tolerated.  No elbow flexion against resistance until 4 weeks postoperatively then limited to 5-8 pounds until 8 weeks postop.  He will follow-up in clinic in 4 weeks.    Assessment and plan discussed with Dr. Mata.    Cara De La Garza MD  Orthopaedic Surgery Resident, PGY1     I have personally examined this patient and have reviewed the clinical presentation and progress note with the resident.  I agree with the  treatment plan as outlined.  The plan was formulated with the resident on the day of the resident's note.

## 2018-11-28 NOTE — LETTER
11/28/2018       RE: Suhail Mustafa  [address] [place] [address]     Dear Colleague,    Thank you for referring your patient, Suhail Mustafa, to [facility]. Please see a copy of my visit note below.    CHIEF COMPLAINT: 2 weeks status post left arthroscopic extensive glenohumeral debridement and open biceps tenodesis  DATE OF SURGERY: 11/16/18    HISTORY OF PRESENT ILLNESS: Suhail Mustafa is an extremely pleasant RHD 48 year-old male who is 2 weeks status post the above procedure.  The patient reports that he has been doing well and pain has been well controlled.  He is currently taking Tylenol during the day and oxycodone only at night.  The numbness from the block lasted for 6 days but he does not have numbness or tingling.  He has had irritation to his medial elbow from a seam in the shoulder immobilizer.  No issues with wound healing or drainage.  No fevers or chills.    EXAM:  Pleasant adult male in NAD  Respirations even and unlabored.  Left upper extremity: Incisions well approximated, clean, dry, and intact.  Steri-Strips were removed today.  Distally neurovascularly intact without deficits. Shoulder range of motion not tested due to postop restrictions.    ASSESSMENT:  1.  2 weeks status post above procedure    PLAN:  The patient's is progressing as expected postoperatively and arthroscopic imaging was reviewed today.  We will have him continue with left shoulder pendulums and start table slides wall blocks.  Did encourage elbow and hand range of motion.  He will stay in the sling for 2 more weeks however he can come out of the sling if the sling continues to be irritating to his arm.  We will have him start physical therapy with [facility] in [place] at 4 weeks postop.  He may work on P/AA/AROM as tolerated.  No elbow flexion against resistance until 4 weeks postoperatively then limited to 5-8 pounds until 8 weeks postop.   He will follow-up in clinic in 4 weeks.    Assessment and plan discussed with Dr. Mata.    Cara De La Garza MD  Orthopaedic Surgery Resident, PGY1     I have personally examined this patient and have reviewed the clinical presentation and progress note with the resident.  I agree with the treatment plan as outlined.  The plan was formulated with the resident on the day of the resident's note.     Again, thank you for allowing me to participate in the care of your patient.      Sincerely,    Anai Mata MD

## 2018-12-05 LAB
FUNGUS SPEC CULT: ABNORMAL
FUNGUS SPEC CULT: ABNORMAL
SPECIMEN SOURCE: ABNORMAL

## 2018-12-13 ENCOUNTER — THERAPY VISIT (OUTPATIENT)
Dept: PHYSICAL THERAPY | Facility: CLINIC | Age: 48
End: 2018-12-13
Attending: ORTHOPAEDIC SURGERY
Payer: COMMERCIAL

## 2018-12-13 DIAGNOSIS — M25.512 PAIN IN JOINT OF LEFT SHOULDER: Primary | ICD-10-CM

## 2018-12-13 DIAGNOSIS — S43.432D LABRAL TEAR OF SHOULDER, LEFT, SUBSEQUENT ENCOUNTER: ICD-10-CM

## 2018-12-13 PROCEDURE — 97161 PT EVAL LOW COMPLEX 20 MIN: CPT | Mod: GP | Performed by: PHYSICAL THERAPIST

## 2018-12-13 PROCEDURE — 97110 THERAPEUTIC EXERCISES: CPT | Mod: GP | Performed by: PHYSICAL THERAPIST

## 2018-12-13 PROCEDURE — 97530 THERAPEUTIC ACTIVITIES: CPT | Mod: GP | Performed by: PHYSICAL THERAPIST

## 2018-12-14 NOTE — PROGRESS NOTES
[facility] Initial Evaluation    Physical Therapy Initial Examination/Evaluation  December 14, 2018    Suhail Mustafa  is a 48 year old  male referred to physical therapy by Dr. Mata for treatment of a L shoulder labral repair.      DOI/onset 2017  Mechanism of injury gradual onset, nothing specific  DOS 11-16-18  Prior treatment PT 1 year ago. Effect of prior treatment moderate decrease in pain    Chief Complaint:   Limited ROM and strength.   Pain location: L shoulder  Quality: aching  Constant/Intermittent: constant  sympttoms have improved since onset.    Current pain 2/10.  Pain at best 2/10.  Pain at worst 4/10.    Symptoms aggravated by reaching with left arm; sleeping on L side    Symptoms improved with meds, ice, rest.     Social history:   with 2 children.    Occupation: IT developer.  Job duties:  computer.    Patient having difficulty with ADLs: dressing, grooming.    Patient's goals are reduce pain and return to golf and workouts..    Patient reports general health as good.  Related medical history none.    Surgical History:  none.    Imaging: MRI in August.    Medications:  OTC currently.         Return to MD:  4 weeks.      Clinical Impression: Patient would benefit from continued physical therapy to restore normal ROM and strength.      Subjective:  HPI                    Objective:  Standing Alignment:      Shoulder/UE:  Rounded shoulders                  Flexibility/Screens:   Positive screens:  Shoulder                             Shoulder Evaluation:  ROM:  AROM:    Flexion:  Left:  131    Right:  171    Abduction:  Left: 115   Right:  165    Internal Rotation:  Left:  49    Right:  70  External Rotation:  Left:  53    Right:  91                PROM:    Flexion:  Left:  140          Abduction:  Left:  130                              Strength:  not assessed                      Stability Testing:  normal      Special Tests:      Left shoulder negative for the  following special tests:  Impingement and Rotator cuff tear    Palpation:    Left shoulder tenderness present at:  Supraspinatus; Infraspinatus and Upper Trap    Mobility Tests:    Glenohumeral anterior left:  Hypomobile    Glenohumeral posterior left:  Hypomobile    Glenohumeral inferior left:  Hypomobile                                             General     ROS    Assessment/Plan:    Patient is a 48 year old male with left side shoulder complaints.    Patient has the following significant findings with corresponding treatment plan.                Diagnosis 1:  S/p L shoulder labral repair/ biceps tenodesis  Pain -  self management and education  Decreased ROM/flexibility - manual therapy and therapeutic exercise  Decreased strength - therapeutic exercise and therapeutic activities  Impaired muscle performance - neuro re-education  Decreased function - therapeutic activities    Therapy Evaluation Codes:   1) History comprised of:   Personal factors that impact the plan of care:      None.    Comorbidity factors that impact the plan of care are:      None.     Medications impacting care: None.  2) Examination of Body Systems comprised of:   Body structures and functions that impact the plan of care:      Shoulder.   Activity limitations that impact the plan of care are:      Dressing and Lifting.  3) Clinical presentation characteristics are:   Stable/Uncomplicated.  4) Decision-Making    Low complexity using standardized patient assessment instrument and/or measureable assessment of functional outcome.  Cumulative Therapy Evaluation is: Low complexity.    Previous and current functional limitations:  (See Goal Flow Sheet for this information)    Short term and Long term goals: (See Goal Flow Sheet for this information)     Communication ability:  Patient appears to be able to clearly communicate and understand verbal and written communication and follow directions correctly.  Treatment Explanation - The following  has been discussed with the patient:   RX ordered/plan of care  Anticipated outcomes  Possible risks and side effects  This patient would benefit from PT intervention to resume normal activities.   Rehab potential is good.    Frequency:  1 X week, once daily  Duration:  for 8 weeks  Discharge Plan:  Achieve all LTG.  Independent in home treatment program.  Reach maximal therapeutic benefit.    Please refer to the daily flowsheet for treatment today, total treatment time and time spent performing 1:1 timed codes.

## 2018-12-20 ENCOUNTER — THERAPY VISIT (OUTPATIENT)
Dept: PHYSICAL THERAPY | Facility: CLINIC | Age: 48
End: 2018-12-20
Attending: ORTHOPAEDIC SURGERY
Payer: COMMERCIAL

## 2018-12-20 DIAGNOSIS — Z47.89 AFTERCARE FOLLOWING SURGERY OF THE MUSCULOSKELETAL SYSTEM: ICD-10-CM

## 2018-12-20 DIAGNOSIS — S43.432D SUPERIOR GLENOID LABRUM LESION OF LEFT SHOULDER, SUBSEQUENT ENCOUNTER: ICD-10-CM

## 2018-12-20 PROBLEM — S43.439A SLAP TEAR OF SHOULDER: Status: ACTIVE | Noted: 2018-12-20

## 2018-12-20 PROCEDURE — 97530 THERAPEUTIC ACTIVITIES: CPT | Mod: GP | Performed by: PHYSICAL THERAPIST

## 2018-12-20 PROCEDURE — 97110 THERAPEUTIC EXERCISES: CPT | Mod: GP | Performed by: PHYSICAL THERAPIST

## 2018-12-20 NOTE — PROGRESS NOTES
Subjective:  HPI                    Objective:  System    Physical Exam    General     ROS    Assessment/Plan:    SUBJECTIVE  Subjective changes as noted by pt:   Pt. reports good progress the ROM exercises this week.   Current pain level:  3/10   Changes in function:  Yes (See Goal flowsheet attached for changes in current functional level)     Adverse reaction to treatment or activity:  None    OBJECTIVE  Changes in objective findings:  AROM L sh flex 155; abd 145; IR 60; ER 70. PROM L sh flex 165; abd 155.     ASSESSMENT  Suhail continues to require intervention to meet STG and LTG's: PT  Patient's symptoms are resolving.  Response to therapy has shown an improvement in  pain level and ROM   Progress made towards STG/LTG?  Yes (See Goal flowsheet attached for updates on achievement of STG and LTG)    PLAN  Current treatment program is being advanced to more complex exercises.    PTA/ATC plan:  N/A    Please refer to the daily flowsheet for treatment today, total treatment time and time spent performing 1:1 timed codes.

## 2018-12-27 ENCOUNTER — THERAPY VISIT (OUTPATIENT)
Dept: PHYSICAL THERAPY | Facility: CLINIC | Age: 48
End: 2018-12-27
Attending: ORTHOPAEDIC SURGERY
Payer: COMMERCIAL

## 2018-12-27 DIAGNOSIS — Z47.89 AFTERCARE FOLLOWING SURGERY OF THE MUSCULOSKELETAL SYSTEM: ICD-10-CM

## 2018-12-27 DIAGNOSIS — S43.432D SUPERIOR GLENOID LABRUM LESION OF LEFT SHOULDER, SUBSEQUENT ENCOUNTER: ICD-10-CM

## 2018-12-27 PROCEDURE — 97110 THERAPEUTIC EXERCISES: CPT | Mod: GP | Performed by: PHYSICAL THERAPIST

## 2018-12-27 PROCEDURE — 97530 THERAPEUTIC ACTIVITIES: CPT | Mod: GP | Performed by: PHYSICAL THERAPIST

## 2018-12-27 NOTE — PROGRESS NOTES
Subjective:  HPI                    Objective:  System    Physical Exam    General     ROS    Assessment/Plan:    SUBJECTIVE  Subjective changes as noted by pt:   Pt. cont to note good progress with his L sh with decreasing pain and increasing ROM and functional mobility.   Current pain level:  2/10   Changes in function:  Yes (See Goal flowsheet attached for changes in current functional level)     Adverse reaction to treatment or activity:  None    OBJECTIVE  Changes in objective findings:  AROM L sh flex 160; abd 165; IR 72; ER 75.     ASSESSMENT  Suhail continues to require intervention to meet STG and LTG's: PT  Patient's symptoms are resolving.  Response to therapy has shown an improvement in  pain level and ROM   Progress made towards STG/LTG?  Yes (See Goal flowsheet attached for updates on achievement of STG and LTG)    PLAN  Current treatment program is being advanced to more complex exercises.    PTA/ATC plan:  N/A    Please refer to the daily flowsheet for treatment today, total treatment time and time spent performing 1:1 timed codes.

## 2018-12-28 ENCOUNTER — OFFICE VISIT (OUTPATIENT)
Dept: ORTHOPEDICS | Facility: CLINIC | Age: 48
End: 2018-12-28
Payer: COMMERCIAL

## 2018-12-28 DIAGNOSIS — S43.432D LABRAL TEAR OF SHOULDER, LEFT, SUBSEQUENT ENCOUNTER: Primary | ICD-10-CM

## 2018-12-28 NOTE — LETTER
12/28/2018       RE: Suhail Mustafa  [address] [place] [address]     Dear Colleague,    Thank you for referring your patient, Suhail Mustafa, to the [facility]. Please see a copy of my visit note below.    CHIEF CONCERN: Status post left arthroscopic extensive glenohumeral debridement and open biceps tenodesis  DATE OF SURGERY: 11/16/18    HISTORY OF PRESENT ILLNESS: Suhail Mustafa is a 48 year-old male who is 6 weeks status post the above procedure.  He notes PT is progressing.  No new concerns.    EXAM:  Pleasant adult male in NAD  Respirations even and unlabored.  Left upper extremity: Distally neurovascularly intact without deficits. Shoulder range of motion is passive FE to 160 and Er to 75.    ASSESSMENT:  1.  Six weeks status post above procedure    PLAN:    Range of Motion: Begin passive ROM of the shoulder with physical therapy per operative note.     Sling: Wean from sling at this time.     Pain medication: Reviewed plan to be off all narcotic pain medications. NSAIDs and Tylenol PRN    Work: Return to work reviewed and note provided as needed    Follow up: 6 weeks with no new radiographs needed. Plan to advance to strengthening at 3 months pending clinical follow up.     Again, thank you for allowing me to participate in the care of your patient.      Sincerely,    Anai Mata MD

## 2019-01-10 ENCOUNTER — THERAPY VISIT (OUTPATIENT)
Dept: PHYSICAL THERAPY | Facility: CLINIC | Age: 49
End: 2019-01-10
Payer: COMMERCIAL

## 2019-01-10 DIAGNOSIS — S43.432D SUPERIOR GLENOID LABRUM LESION OF LEFT SHOULDER, SUBSEQUENT ENCOUNTER: ICD-10-CM

## 2019-01-10 DIAGNOSIS — Z47.89 AFTERCARE FOLLOWING SURGERY OF THE MUSCULOSKELETAL SYSTEM: ICD-10-CM

## 2019-01-10 PROCEDURE — 97110 THERAPEUTIC EXERCISES: CPT | Mod: GP | Performed by: PHYSICAL THERAPIST

## 2019-01-10 PROCEDURE — 97112 NEUROMUSCULAR REEDUCATION: CPT | Mod: GP | Performed by: PHYSICAL THERAPIST

## 2019-01-11 NOTE — PROGRESS NOTES
Subjective:  HPI                    Objective:  System    Physical Exam    General     ROS    Assessment/Plan:    SUBJECTIVE  Subjective changes as noted by pt:   Pt. continues to make steady progress with increased L sh ROM and strength and decreasing pain.      Current pain level: 2/10   Changes in function:  Yes (See Goal flowsheet attached for changes in current functional level)     Adverse reaction to treatment or activity:  None    OBJECTIVE  Changes in objective findings:  AROM L sh flex 165; abd 168; IR 75; ER 76. Strength L sh flex 4-/5; abd 4-/5; ER 4/5      ASSESSMENT  Suhail continues to require intervention to meet STG and LTG's: PT  Patient's symptoms are resolving.  Response to therapy has shown an improvement in  pain level, ROM  and strength  Progress made towards STG/LTG?  Yes (See Goal flowsheet attached for updates on achievement of STG and LTG)    PLAN  Current treatment program is being advanced to more complex exercises.    PTA/ATC plan:  N/A    Please refer to the daily flowsheet for treatment today, total treatment time and time spent performing 1:1 timed codes.

## 2019-01-16 NOTE — PROGRESS NOTES
CHIEF CONCERN: Status post left arthroscopic extensive glenohumeral debridement and open biceps tenodesis  DATE OF SURGERY: 11/16/18    HISTORY OF PRESENT ILLNESS: Suhail Mustafa is a 48 year-old male who is 6 weeks status post the above procedure.  He notes PT is progressing.  No new concerns.    EXAM:  Pleasant adult male in NAD  Respirations even and unlabored.  Left upper extremity: Distally neurovascularly intact without deficits. Shoulder range of motion is passive FE to 160 and Er to 75.    ASSESSMENT:  1.  Six weeks status post above procedure    PLAN:    Range of Motion: Begin passive ROM of the shoulder with physical therapy per operative note.     Sling: Wean from sling at this time.     Pain medication: Reviewed plan to be off all narcotic pain medications. NSAIDs and Tylenol PRN    Work: Return to work reviewed and note provided as needed    Follow up: 6 weeks with no new radiographs needed. Plan to advance to strengthening at 3 months pending clinical follow up.

## 2019-01-23 ENCOUNTER — THERAPY VISIT (OUTPATIENT)
Dept: PHYSICAL THERAPY | Facility: CLINIC | Age: 49
End: 2019-01-23
Payer: COMMERCIAL

## 2019-01-23 DIAGNOSIS — Z47.89 AFTERCARE FOLLOWING SURGERY OF THE MUSCULOSKELETAL SYSTEM: ICD-10-CM

## 2019-01-23 DIAGNOSIS — S43.432D SUPERIOR GLENOID LABRUM LESION OF LEFT SHOULDER, SUBSEQUENT ENCOUNTER: ICD-10-CM

## 2019-01-23 PROCEDURE — 97110 THERAPEUTIC EXERCISES: CPT | Mod: GP

## 2019-01-23 PROCEDURE — 97112 NEUROMUSCULAR REEDUCATION: CPT | Mod: GP

## 2019-01-23 NOTE — PROGRESS NOTES
Subjective:  HPI                    Objective:  System    Physical Exam    General     ROS    Assessment/Plan:    SUBJECTIVE  Subjective changes as noted by pt:   Pt reports that shoulder still feels sensitive on the top. Notices it when he reaches high into cupboard.    Current pain level: 1/10 Current Pain level: 1/10   Changes in function:  Yes (See Goal flowsheet attached for changes in current functional level)     Adverse reaction to treatment or activity:  None    OBJECTIVE  Changes in objective findings:  Yes,   Objective: AROM L Flex 160, , IR T10, ER 70 all WFL. PROM ER 83 at 60 deg ABD, IR 76 both guarded end range. Strength Flex and ABD 4/5, IR 5/5, ER 5-/5 with MMT.      ASSESSMENT  Suhail continues to require intervention to meet STG and LTG's: PT  Patient is progressing as expected.  Response to therapy has shown an improvement in  pain level and muscle control  Progress made towards STG/LTG?  Yes (See Goal flowsheet attached for updates on achievement of STG and LTG)    PLAN  Current treatment program is being advanced to more complex exercises.    PTA/ATC plan:  Will continue with present plan of care. Re check in PT 2 weeks    Please refer to the daily flowsheet for treatment today, total treatment time and time spent performing 1:1 timed codes.

## 2019-02-07 ENCOUNTER — THERAPY VISIT (OUTPATIENT)
Dept: PHYSICAL THERAPY | Facility: CLINIC | Age: 49
End: 2019-02-07
Payer: COMMERCIAL

## 2019-02-07 DIAGNOSIS — S43.432D SUPERIOR GLENOID LABRUM LESION OF LEFT SHOULDER, SUBSEQUENT ENCOUNTER: ICD-10-CM

## 2019-02-07 DIAGNOSIS — Z47.89 AFTERCARE FOLLOWING SURGERY OF THE MUSCULOSKELETAL SYSTEM: ICD-10-CM

## 2019-02-07 PROCEDURE — 97112 NEUROMUSCULAR REEDUCATION: CPT | Mod: GP | Performed by: PHYSICAL THERAPIST

## 2019-02-07 PROCEDURE — 97110 THERAPEUTIC EXERCISES: CPT | Mod: GP | Performed by: PHYSICAL THERAPIST

## 2019-02-08 NOTE — PROGRESS NOTES
Subjective:  HPI                    Objective:  System    Physical Exam    General      ROS    Assessment/Plan:    SUBJECTIVE  Subjective changes as noted by pt:   Pt. cont to report good progress with decreased overall L sh pain and improved mobility and strength. Pt. shoveled snow this week and was a little sore after.   Current pain level:  2/10   Changes in function:  Yes (See Goal flowsheet attached for changes in current functional level)     Adverse reaction to treatment or activity:  None    OBJECTIVE  Changes in objective findings:  AROM L sh flex 165; abd 168; IR 70; ER 81. Strength L sh flex 4/5; abd 4-/5; ER 4/5     ASSESSMENT  Suhail continues to require intervention to meet STG and LTG's: PT  Patient's symptoms are resolving.  Response to therapy has shown an improvement in  pain level, ROM  and strength  Progress made towards STG/LTG?  Yes (See Goal flowsheet attached for updates on achievement of STG and LTG)    PLAN  Current treatment program is being advanced to more complex exercises.    PTA/ATC plan:  N/A    Please refer to the daily flowsheet for treatment today, total treatment time and time spent performing 1:1 timed codes.

## 2019-02-20 ENCOUNTER — OFFICE VISIT (OUTPATIENT)
Dept: ORTHOPEDICS | Facility: CLINIC | Age: 49
End: 2019-02-20
Payer: COMMERCIAL

## 2019-02-20 DIAGNOSIS — M19.012 ARTHRITIS OF SHOULDER REGION, LEFT: Primary | ICD-10-CM

## 2019-02-20 NOTE — LETTER
2/20/2019       RE: Suhail Mustafa  [address] [place] [address]     Dear Colleague,    Thank you for referring your patient, Suhail Mustafa, to the [facility]. Please see a copy of my visit note below.    CHIEF COMPLAINT: 3 months s/p left arthroscopic extensive glenohumeral debridement and open biceps tenodesis  DATE OF SURGERY: 11/16/18    HISTORY OF PRESENT ILLNESS: Suhail Mustafa is a pleasant RHD 48 year-old male who is 3 months status post the above procedure.  The patient reports that he has been doing very well.  He denies pain. He has been shoveling snow.  Any discomfort he has is due to muscle soreness and recovery, and this pain is different than prior to surgery, per patient.  No numbness/tingling.    EXAM:  Pleasant adult male in NAD  Respirations even and unlabored.  Left upper extremity: Incisions well-healed, clean, dry, and intact.  No tima deformity. Good ROM, flexion 160 vs 170 right arm, ER 70 vs 80 right arm.  Distally neurovascularly intact without deficits.   ASSESSMENT:  1.  49 y/o M 3 months s/p L shoulder arthroscopic glenohumeral debridement and open biceps tenodesis, doing very well.    PLAN:  The patient's is progressing as expected and very well postoperatively.  At this point he has no restrictions and may continue with stretching and strengthening exercises as tolerated.  He will ease into high intensity or aggressive activities.  He is in agreement with this plan.  All of his questions are answered.  Will follow-up on an as-needed basis.  Assessment and plan discussed with Dr. Mata.    Kaden Orantes  Orthopedic Surgery  Sports Medicine Fellow    I have personally examined this patient and have reviewed the clinical presentation and progress note with the fellow.  I agree with the treatment plan as outlined.  The plan was formulated with the fellow on the day of the fellow's note.     Anai  Megan Mata MD

## 2019-02-20 NOTE — NURSING NOTE
Reason For Visit:   Chief Complaint   Patient presents with     Surgical Followup     3 month pop DOS 11/16 left arthroscopic extensive glenohumeral debridement and open biceps tenodesis       PCP: To Whitt  Ref: Dr. To Callaway     ?  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  Work status?  Full time.  Date of injury: 1990s  Type of injury: dislocated his shoulder playing boot hockey.  Pain started to increase about 3 years ago.  Date of surgery: NA  Type of surgery: NA.  Smoker: No  Request smoking cessation information: No     Right hand dominant    SANE score  Affected shoulder: :Left  Right shoulder SANE:100  Left shoulder SANE: 85    There were no vitals taken for this visit.      Pain Assessment  Patient Currently in Pain: Yes  0-10 Pain Scale: 2  Primary Pain Location: Shoulder(Left)  Pain Descriptors: (discomfort)  Alleviating Factors: Pain medication  Aggravating Factors: Exercise(pain worse in the morning)    Jewels Corbett, ATC

## 2019-02-21 NOTE — PROGRESS NOTES
CHIEF COMPLAINT: 3 months s/p left arthroscopic extensive glenohumeral debridement and open biceps tenodesis  DATE OF SURGERY: 11/16/18    HISTORY OF PRESENT ILLNESS: Suhail Mustafa is a pleasant RHD 48 year-old male who is 3 months status post the above procedure.  The patient reports that he has been doing very well.  He denies pain. He has been shoveling snow.  Any discomfort he has is due to muscle soreness and recovery, and this pain is different than prior to surgery, per patient.  No numbness/tingling.    EXAM:  Pleasant adult male in NAD  Respirations even and unlabored.  Left upper extremity: Incisions well-healed, clean, dry, and intact.  No tima deformity. Good ROM, flexion 160 vs 170 right arm, ER 70 vs 80 right arm.  Distally neurovascularly intact without deficits.   ASSESSMENT:  1.  49 y/o M 3 months s/p L shoulder arthroscopic glenohumeral debridement and open biceps tenodesis, doing very well.    PLAN:  The patient's is progressing as expected and very well postoperatively.  At this point he has no restrictions and may continue with stretching and strengthening exercises as tolerated.  He will ease into high intensity or aggressive activities.  He is in agreement with this plan.  All of his questions are answered.  Will follow-up on an as-needed basis.  Assessment and plan discussed with Dr. Mata.  .    Kaden Orantes  Orthopedic Surgery  Sports Medicine Fellow    I have personally examined this patient and have reviewed the clinical presentation and progress note with the fellow.  I agree with the treatment plan as outlined.  The plan was formulated with the fellow on the day of the fellow's note.

## 2019-02-22 ENCOUNTER — THERAPY VISIT (OUTPATIENT)
Dept: PHYSICAL THERAPY | Facility: CLINIC | Age: 49
End: 2019-02-22
Payer: COMMERCIAL

## 2019-02-22 DIAGNOSIS — Z47.89 AFTERCARE FOLLOWING SURGERY OF THE MUSCULOSKELETAL SYSTEM: ICD-10-CM

## 2019-02-22 DIAGNOSIS — S43.432D SUPERIOR GLENOID LABRUM LESION OF LEFT SHOULDER, SUBSEQUENT ENCOUNTER: ICD-10-CM

## 2019-02-22 PROCEDURE — 97112 NEUROMUSCULAR REEDUCATION: CPT | Mod: GP | Performed by: PHYSICAL THERAPIST

## 2019-02-22 PROCEDURE — 97110 THERAPEUTIC EXERCISES: CPT | Mod: GP | Performed by: PHYSICAL THERAPIST

## 2019-02-22 NOTE — PROGRESS NOTES
Subjective:  HPI                    Objective:  System    Physical Exam    General     ROS    Assessment/Plan:    PROGRESS  REPORT    Progress reporting period is from 12-13-18 to 2-22-19.       SUBJECTIVE  Subjective changes noted by patient:   Pt. has made good progress in PT thus far following L shoulder surgery.  Pt. saw his surgeon this week and has no further restrictions. He can proceed with activity as tolerated. Pt. plans to resume swimming next week. Pt. has been able to shovel snow without difficulty and is doing many of his normal activities without a problem. He is working now to regain his strength.      Current pain level is 1/10.     Previous pain level was  4/10.   Changes in function:  Yes (See Goal flowsheet attached for changes in current functional level)  Adverse reaction to treatment or activity: None    OBJECTIVE  Changes noted in objective findings:  AROM L sh flex 168; abd 170; ER 82; IR 65. Strength L sh flex 4/5; abd 4-/5; ER 4/5     ASSESSMENT/PLAN  Updated problem list and treatment plan: Diagnosis 1:  S/p L shoulder SLAP repair  Pain -  self management and education  Decreased ROM/flexibility - manual therapy and therapeutic exercise  Decreased strength - therapeutic exercise and therapeutic activities  Impaired muscle performance - neuro re-education  Decreased function - therapeutic activities  STG/LTGs have been met or progress has been made towards goals:  Yes (See Goal flow sheet completed today.)  Assessment of Progress: The patient's condition is improving.  Self Management Plans:  Patient has been instructed in a home treatment program.  Patient  has been instructed in self management of symptoms.  I have re-evaluated this patient and find that the nature, scope, duration and intensity of the therapy is appropriate for the medical condition of the patient.  Suhail continues to require the following intervention to meet STG and LTG's:  PT    Recommendations:  This patient would  benefit from continued therapy.     Frequency:  2 X a month, once daily  Duration:  for 1 month        Please refer to the daily flowsheet for treatment today, total treatment time and time spent performing 1:1 timed codes.

## 2019-03-08 ENCOUNTER — THERAPY VISIT (OUTPATIENT)
Dept: PHYSICAL THERAPY | Facility: CLINIC | Age: 49
End: 2019-03-08
Payer: COMMERCIAL

## 2019-03-08 DIAGNOSIS — S43.432D SUPERIOR GLENOID LABRUM LESION OF LEFT SHOULDER, SUBSEQUENT ENCOUNTER: ICD-10-CM

## 2019-03-08 DIAGNOSIS — Z47.89 AFTERCARE FOLLOWING SURGERY OF THE MUSCULOSKELETAL SYSTEM: ICD-10-CM

## 2019-03-08 PROCEDURE — 97112 NEUROMUSCULAR REEDUCATION: CPT | Mod: GP | Performed by: PHYSICAL THERAPIST

## 2019-03-08 PROCEDURE — 97110 THERAPEUTIC EXERCISES: CPT | Mod: GP | Performed by: PHYSICAL THERAPIST

## 2019-03-09 NOTE — PROGRESS NOTES
Subjective:  HPI                    Objective:  System    Physical Exam    General     ROS    Assessment/Plan:    SUBJECTIVE  Subjective changes as noted by pt:  Pt. did some roof raking yesterday which has caused shoulder soreness today but he feels it went well overall.   Current pain level:  2/10   Changes in function:  Yes (See Goal flowsheet attached for changes in current functional level)     Adverse reaction to treatment or activity:  None    OBJECTIVE  Changes in objective findings:  AROM L sh flex 160; abd 150; ER 82; IR 70. Strength L sh flex 4/5; abd 4/5; ER 4/5     ASSESSMENT  Suhail continues to require intervention to meet STG and LTG's: PT  Patient's symptoms are resolving.  Response to therapy has shown an improvement in  pain level, ROM  and strength  Progress made towards STG/LTG?  Yes (See Goal flowsheet attached for updates on achievement of STG and LTG)    PLAN  Current treatment program is being advanced to more complex exercises.    PTA/ATC plan:  N/A    Please refer to the daily flowsheet for treatment today, total treatment time and time spent performing 1:1 timed codes.

## 2019-03-22 ENCOUNTER — THERAPY VISIT (OUTPATIENT)
Dept: PHYSICAL THERAPY | Facility: CLINIC | Age: 49
End: 2019-03-22
Payer: COMMERCIAL

## 2019-03-22 DIAGNOSIS — Z47.89 AFTERCARE FOLLOWING SURGERY OF THE MUSCULOSKELETAL SYSTEM: ICD-10-CM

## 2019-03-22 DIAGNOSIS — S43.432D SUPERIOR GLENOID LABRUM LESION OF LEFT SHOULDER, SUBSEQUENT ENCOUNTER: ICD-10-CM

## 2019-03-22 PROCEDURE — 97530 THERAPEUTIC ACTIVITIES: CPT | Mod: GP | Performed by: PHYSICAL THERAPIST

## 2019-03-22 PROCEDURE — 97112 NEUROMUSCULAR REEDUCATION: CPT | Mod: GP | Performed by: PHYSICAL THERAPIST

## 2019-03-22 PROCEDURE — 97110 THERAPEUTIC EXERCISES: CPT | Mod: GP | Performed by: PHYSICAL THERAPIST

## 2019-03-22 NOTE — PROGRESS NOTES
Subjective:  HPI                    Objective:  System    Physical Exam    General     ROS    Assessment/Plan:    DISCHARGE REPORT    Progress reporting period is from 2-22-19 to 3-22-19.       SUBJECTIVE  Subjective changes noted by patient:   Pt. has made good progress in PT following L shoulder surgery as he now demonstrates good ROM and steadily improving strength. Pt. has resumed all of his normal daily activities without difficulty and has been able to get back to the gym to work out in the past few weeks. He is starting to do more physical activities with the arm as well without a problem. Pt. will continue his HEP with no further PT visits planned unless increased problems arise.      Current pain level is  1/10.     Previous pain level was 4/10.   Changes in function:  Yes (See Goal flowsheet attached for changes in current functional level)  Adverse reaction to treatment or activity: None    OBJECTIVE  Changes noted in objective findings: AROM L sh flex 165; abd 170; Strength L sh flex 4+/5; abd 4/5; ER 4/5.     ASSESSMENT/PLAN  Updated problem list and treatment plan: Diagnosis 1:  S/p L shoulder SLAP repair  Pain -  self management and education  Decreased ROM/flexibility - manual therapy and therapeutic exercise  Decreased strength - therapeutic exercise and therapeutic activities  Impaired muscle performance - neuro re-education  Decreased function - therapeutic activities  STG/LTGs have been met or progress has been made towards goals:  Yes (See Goal flow sheet completed today.)  Assessment of Progress: Patient is meeting short term goals and is progressing towards long term goals.  Self Management Plans:  Patient is independent in a home treatment program.  Patient is independent in self management of symptoms.  I have re-evaluated this patient and find that the nature, scope, duration and intensity of the therapy is appropriate for the medical condition of the patient.  Suhail continues to require the  following intervention to meet STG and LTG's:  PT intervention is no longer required to meet STG/LTG.    Recommendations:  This patient is ready to be discharged from therapy and continue their home treatment program.    Please refer to the daily flowsheet for treatment today, total treatment time and time spent performing 1:1 timed codes.

## 2019-05-14 DIAGNOSIS — E78.5 HYPERLIPIDEMIA LDL GOAL <100: ICD-10-CM

## 2019-05-14 NOTE — TELEPHONE ENCOUNTER
"Requested Prescriptions   Pending Prescriptions Disp Refills     simvastatin (ZOCOR) 20 MG tablet [Pharmacy Med Name: SIMVASTATIN 20 MG TABLET] 90 tablet 3     Sig: TAKE 1 TABLET (20 MG) BY MOUTH AT BEDTIME   Last Written Prescription Date:  4/25/18  Last Fill Quantity: 90,  # refills: 3   Last office visit: 11/8/2018 with prescribing provider:     Future Office Visit:        [facility] Protocol Passed - 5/14/2019 11:41 AM        Passed - LDL on file in past 12 months     Recent Labs   Lab Test 11/16/18  0742   LDL 81             Passed - No abnormal creatine kinase in past 12 months     Recent Labs   Lab Test 04/05/11   CKTOTAL 136                Passed - Recent (12 mo) or future (30 days) visit within the authorizing provider's specialty     Patient had office visit in the last 12 months or has a visit in the next 30 days with authorizing provider or within the authorizing provider's specialty.  See \"Patient Info\" tab in inbasket, or \"Choose Columns\" in Meds & Orders section of the refill encounter.              Passed - Medication is active on med list        Passed - Patient is age 18 or older          "

## 2019-05-15 RX ORDER — SIMVASTATIN 20 MG
20 TABLET ORAL AT BEDTIME
Qty: 90 TABLET | Refills: 3 | Status: SHIPPED | OUTPATIENT
Start: 2019-05-15 | End: 2019-10-08

## 2019-05-15 NOTE — TELEPHONE ENCOUNTER
Prescription approved per [facility] Refill Protocol.  Thalia Marie RN

## 2019-05-28 PROBLEM — S43.439A SLAP TEAR OF SHOULDER: Status: RESOLVED | Noted: 2018-12-20 | Resolved: 2019-05-28

## 2019-05-28 PROBLEM — Z47.89 AFTERCARE FOLLOWING SURGERY OF THE MUSCULOSKELETAL SYSTEM: Status: RESOLVED | Noted: 2018-12-20 | Resolved: 2019-05-28

## 2019-06-05 ENCOUNTER — DOCUMENTATION ONLY (OUTPATIENT)
Dept: OTOLARYNGOLOGY | Facility: CLINIC | Age: 49
End: 2019-06-05

## 2019-06-05 ENCOUNTER — OFFICE VISIT (OUTPATIENT)
Dept: FAMILY MEDICINE | Facility: CLINIC | Age: 49
End: 2019-06-05
Payer: COMMERCIAL

## 2019-06-05 ENCOUNTER — OFFICE VISIT (OUTPATIENT)
Dept: OTOLARYNGOLOGY | Facility: CLINIC | Age: 49
End: 2019-06-05
Payer: COMMERCIAL

## 2019-06-05 VITALS
SYSTOLIC BLOOD PRESSURE: 117 MMHG | DIASTOLIC BLOOD PRESSURE: 72 MMHG | TEMPERATURE: 98.6 F | HEART RATE: 54 BPM | BODY MASS INDEX: 28.56 KG/M2 | OXYGEN SATURATION: 97 % | WEIGHT: 204 LBS

## 2019-06-05 VITALS
HEART RATE: 69 BPM | DIASTOLIC BLOOD PRESSURE: 78 MMHG | SYSTOLIC BLOOD PRESSURE: 112 MMHG | BODY MASS INDEX: 28.28 KG/M2 | WEIGHT: 202 LBS | RESPIRATION RATE: 12 BRPM | TEMPERATURE: 98.1 F | HEIGHT: 71 IN

## 2019-06-05 DIAGNOSIS — H65.92 OME (OTITIS MEDIA WITH EFFUSION), LEFT: ICD-10-CM

## 2019-06-05 DIAGNOSIS — Z52.4 DONOR OF KIDNEY FOR TRANSPLANT: ICD-10-CM

## 2019-06-05 DIAGNOSIS — Z01.818 PREOP GENERAL PHYSICAL EXAM: Primary | ICD-10-CM

## 2019-06-05 DIAGNOSIS — H69.92 DYSFUNCTION OF LEFT EUSTACHIAN TUBE: ICD-10-CM

## 2019-06-05 DIAGNOSIS — N18.30 CKD (CHRONIC KIDNEY DISEASE) STAGE 3, GFR 30-59 ML/MIN (H): ICD-10-CM

## 2019-06-05 DIAGNOSIS — H69.92 DYSFUNCTION OF LEFT EUSTACHIAN TUBE: Primary | ICD-10-CM

## 2019-06-05 LAB
ERYTHROCYTE [DISTWIDTH] IN BLOOD BY AUTOMATED COUNT: 11.3 % (ref 10–15)
HCT VFR BLD AUTO: 40.6 % (ref 40–53)
HGB BLD-MCNC: 14 G/DL (ref 13.3–17.7)
MCH RBC QN AUTO: 33.5 PG (ref 26.5–33)
MCHC RBC AUTO-ENTMCNC: 34.5 G/DL (ref 31.5–36.5)
MCV RBC AUTO: 97 FL (ref 78–100)
PLATELET # BLD AUTO: 198 10E9/L (ref 150–450)
RBC # BLD AUTO: 4.18 10E12/L (ref 4.4–5.9)
WBC # BLD AUTO: 7.5 10E9/L (ref 4–11)

## 2019-06-05 PROCEDURE — 99214 OFFICE O/P EST MOD 30 MIN: CPT | Performed by: FAMILY MEDICINE

## 2019-06-05 PROCEDURE — 36415 COLL VENOUS BLD VENIPUNCTURE: CPT | Performed by: FAMILY MEDICINE

## 2019-06-05 PROCEDURE — 80048 BASIC METABOLIC PNL TOTAL CA: CPT | Performed by: FAMILY MEDICINE

## 2019-06-05 PROCEDURE — 85027 COMPLETE CBC AUTOMATED: CPT | Performed by: FAMILY MEDICINE

## 2019-06-05 ASSESSMENT — PAIN SCALES - GENERAL: PAINLEVEL: SEVERE PAIN (6)

## 2019-06-05 ASSESSMENT — MIFFLIN-ST. JEOR: SCORE: 1796.27

## 2019-06-05 NOTE — LETTER
6/5/2019      RE: Suhail Mustafa  [address] [place] [address]       Suhail Mustafa is seen because his PE tube fell out of his left ear.  He thinks it happened about 6 weeks ago because he could no longer taste his drops.  Since then, his ear has gotten progressively araiza and more painful.  He can still wear his hearing aid with good effect.  No otorrhea or vertigo.    Review of systems:  He says that he's had more nasal drainage since his PE tube has been out but he's not sure if it's because the tube is out or because of seasonal allergies.    Physical examination:  male in no acute distress.  Alert and answering questions appropriately.  HB 1/6 bilaterally.  Left ear examined under the microscope.  Paparella tube sitting in the ear canal and this was removed.  The entire TM is very thick with no landmarks.    Assessment and plan:  Discussion was had regarding PE tube placement on the left.  He would like to proceed and consent was obtained.      Procedure:  Time Out was performed with confirmation of the patient, site and procedure.  Left ear examined under the microscope.  Phenol placed on the entire anterior quadrant.  Myringotomy incision first made in the anterior superior quadrant.  The TM has a thick plaque of myringosclerosis under the epithelium.  I was able to enter the middle ear and a thick mucoid effusion was suctioned out of the middle ear.  This did improve his fullness.  However, I was unable to place even a silicone Paparella tube due to the slit like nature of the myringotomy opening.  I tried another myringotomy in the inferior quadrant and the TM was also very thick in this area and I was only able to make a small opening which again was unable to fit a Paparella tube.    We discussed going to the operating room and performing a laser myringotomy with placement of a large bore Paparella tube under MAC.  The risks and benefits were discussed.  Risks include but are not  limited to:  Drainage, tympanic membrane perforation requiring future repair and need for further tube placement.  He expressed understanding and is agreeable to proceed.    Yaneth Rogel MD

## 2019-06-05 NOTE — PATIENT INSTRUCTIONS
Surgery Teaching      1.You must have a physical exam (called  history and physical ) within 30 days of surgery. You can do this at the PAC clinic or your family clinic.     2.For same-day surgery, you must arrange for an adult to take you home from the Center. An adult must stay with you for the first 24 hours after surgery. You cannot     3.Ask your doctor what medicines (including aspirin and ibuprofen) are safe before surgery.     4. Stop drinking alcohol at least 24 hours before surgery.     5. Stop or at least cut down on smoking 24 hours before surgery.    6.Take a bath or shower the night before and the morning of surgery (as told by your surgeon). Use an antiseptic soap. If your doctor does not give you special soap, buy Hibiclens or Araceli-Stat at the drug store or ask the pharmacist to suggest a brand.  Do not put on lotion, powder, perfume, deodorant or make-up after bathing.    7. You can eat a normal meal the night before surgery. Do not eat any solid foods or drink any milk products for 8 hours before surgery.     8. You may drink clear liquids until 2 hours before surgery. Clear liquids include water, Gatorade, apple juice and liquids you can read through.

## 2019-06-05 NOTE — PROGRESS NOTES
Patient is scheduled for surgery with Dr. Rogel    Procedure: Laser Myringotomy with PE tube placement    Spoke or left message with: spoke with patient    Date of Surgery: 6/12/19    Location: [facility]    H&P: Scheduled with [facility]    Additional imaging/appointments: Post op   Win: 7/25/19 7:30 am  Post op 7/25 8:30 am    Surgery packet: given at appt        Linda Osorio   [facility]-Op Coordinator  [phone]

## 2019-06-05 NOTE — PROGRESS NOTES
[address] [id number] [phone]  Dept: [phone]    PRE-OP EVALUATION:  Today's date: 2019    Suhail Mustafa (: 1970) presents for pre-operative evaluation assessment as requested by Dr. Yaneth Jones.  He requires evaluation and anesthesia risk assessment prior to undergoing surgery/procedure for treatment of left ear eustachian tube dysfunction and otitis media with effusion.    Fax number for surgical facility: [facility] [place], [phone]  Primary Physician: To Whitt  Type of Anesthesia Anticipated: to be determined    Patient has a Health Care Directive or Living Will:  YES on file    Preop Questions 2019   Who is doing your surgery? Dr. Yaneth Jones   What are you having done? ear surgery   Date of Surgery/Procedure: 2019   Facility or Hospital where procedure/surgery will be performed: [facility]   1.  Do you have a history of Heart attack, stroke, stent, coronary bypass surgery, or other heart surgery? No   2.  Do you ever have any pain or discomfort in your chest? No   3.  Do you have a history of  Heart Failure? No   4.   Are you troubled by shortness of breath when:  walking on a level surface, or up a slight hill, or at night? No   5.  Do you currently have a cold, bronchitis or other respiratory infection? No   6.  Do you have a cough, shortness of breath, or wheezing? No   7.  Do you sometimes get pains in the calves of your legs when you walk? No   8. Do you or anyone in your family have previous history of blood clots? No   9.  Do you or does anyone in your family have a serious bleeding problem such as prolonged bleeding following surgeries or cuts? No   10. Have you ever had problems with anemia or been told to take iron pills? YES   11. Have you had any abnormal blood loss such as black, tarry or bloody stools? No   12. Have you ever had a blood  transfusion? No   13. Have you or any of your relatives ever had problems with anesthesia? No   14. Do you have sleep apnea, excessive snoring or daytime drowsiness? No   15. Do you have any prosthetic heart valves? No   16. Do you have prosthetic joints? No         HPI:     HPI related to upcoming procedure: 48-year-old white male with a long history of left year eustachian tube dysfunction and recurrent otitis media with effusion has had multiple left ear procedures done.  He has had a PE tube in the left TM for most of the last 7 or 8 years.  This morning in the clinic he had an attempt at placing one in the TM, but it was unsuccessful, so he is coming in next week for a laser myringotomy with left pressure equalization tube placement.        See problem list for active medical problems.  Problems all longstanding and stable, except as noted/documented.  See ROS for pertinent symptoms related to these conditions.    MEDICAL HISTORY:     Patient Active Problem List    Diagnosis Date Noted     Labral tear of shoulder, left, subsequent encounter 08/10/2018     Priority: Medium     Shoulder instability, left 08/10/2018     Priority: Medium     CKD (chronic kidney disease) stage 3, GFR 30-59 ml/min (H) 04/25/2018     Priority: Medium     Hyperlipidemia LDL goal <100 04/24/2018     Priority: Medium     Overweight (BMI 25.0-29.9) 04/24/2018     Priority: Medium     Acute pain of left shoulder 06/17/2017     Priority: Medium     ACP (advance care planning) 12/21/2016     Priority: Medium     Advance Care Planning 12/21/2016: Receipt of ACP document:  Received: Health Care Directive which was witnessed or notarized on 10-12-16.  Document previously scanned on 10-25-16.  Validation form completed and sent to be scanned.  Code Status reflects choices in most recent ACP document.  Confirmed/documented designated decision maker(s).  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 2013    Vasectomy     LAPAROSCOPIC DONOR HAND ASSISTED KIDNEY LIVING RELATED N/A 10/13/2016    Procedure: LAPAROSCOPIC DONOR HAND ASSISTED KIDNEY LIVING RELATED;  Surgeon: Tigre Quarles MD;  [facility]: [facility] OR     LASER DIODE TYMPANOMASTOIDECTOMY Left 3/31/2015    Procedure: LASER DIODE TYMPANOMASTOIDECTOMY;  Surgeon: Yaneth Rogel MD;  [facility]     ORTHOPEDIC SURGERY  child    dislocated hip     OSSICULAR CHAIN RECONSTRUCTION N/A 10/9/2015    Procedure: OSSICULAR CHAIN RECONSTRUCTION;  Surgeon: Yaneth Rogel MD;  [facility]     TYMPANOPLASTY Left 10/9/2015    Procedure: TYMPANOPLASTY;  Surgeon: Yaneth Rogel MD;  [facility]     Current Outpatient Medications   Medication Sig Dispense Refill     acetaminophen (TYLENOL) 325 MG tablet Take 2 tablets (650 mg) by mouth every 4 hours as needed for mild pain 50 tablet 0     acetic acid (VOSOL) 2 % otic solution  INSTILL 4 DROPS TO THE LEFT EAR- DAILY OR EVERY OTHER DAY  11     Ferrous Sulfate (IRON SUPPLEMENT PO)        simvastatin (ZOCOR) 20 MG tablet TAKE 1 TABLET (20 MG) BY MOUTH AT BEDTIME 90 tablet 3     triamcinolone (KENALOG) 0.1 % ointment Apply sparingly to affected area three times a day 80 g 2     VITAMIN D, CHOLECALCIFEROL, PO Take 1,000 Units by mouth daily        OTC products: None, except as noted above    No Known Allergies   Latex Allergy: NO    Social History     Tobacco Use     Smoking status: Never Smoker     Smokeless tobacco: Never Used   Substance Use Topics     Alcohol use: Yes     Alcohol/week: 0.6 oz     Types: 1 Standard drinks or equivalent per week     Comment: once per week with a meal     History   Drug Use No       REVIEW OF SYSTEMS:   CONSTITUTIONAL: NEGATIVE for fever, chills, change in weight  INTEGUMENTARY/SKIN: NEGATIVE for worrisome rashes, moles or lesions  EYES: NEGATIVE for vision changes or irritation  ENT/MOUTH: See above.  He does have left ear hearing loss and wears a left hearing aid.  RESP: NEGATIVE for significant cough or SOB  BREAST: NEGATIVE for masses, tenderness or discharge  CV: NEGATIVE for chest pain, palpitations or peripheral edema  GI: NEGATIVE for nausea, abdominal pain, heartburn, or change in bowel habits  : NEGATIVE for frequency, dysuria, or hematuria  MUSCULOSKELETAL: NEGATIVE for significant arthralgias or myalgia  NEURO: NEGATIVE for weakness, dizziness or paresthesias  ENDOCRINE: NEGATIVE for temperature intolerance, skin/hair changes  HEME: NEGATIVE for bleeding problems  PSYCHIATRIC: NEGATIVE for changes in mood or affect    EXAM:   /72 (BP Location: Right arm, Patient Position: Chair, Cuff Size: Adult Regular)   Pulse 54   Temp 98.6  F (37  C) (Oral)   Wt 92.5 kg (204 lb)   SpO2 97%   BMI 28.56 kg/m      GENERAL APPEARANCE: healthy, alert and no distress     EYES: EOMI,  PERRL     HENT: ear canals are clear and right TM appears normal.  Left  TM appears bloodied and scarred from the procedure this morning.  Nose and mouth without ulcers or lesions     NECK: no adenopathy, no asymmetry, masses, or scars and thyroid normal to palpation     RESP: lungs clear to auscultation - no rales, rhonchi or wheezes     CV: regular rates and rhythm, normal S1 S2, no S3 or S4 and no murmur, click or rub     ABDOMEN:  soft, nontender, no HSM or masses      MS: extremities normal -- no gross deformities noted     SKIN: no suspicious lesions or rashes     NEURO: Normal strength and tone, sensory exam grossly normal, mentation intact and speech normal     PSYCH: mentation appears normal. and affect normal/bright    DIAGNOSTICS:   EKG: from 11/18 --   Sinus  Rhythm  -Short ID syndrome   Tom = 118  BORDERLINE RHYTHM    LABS:   Office Visit on 06/05/2019   Component Date Value Ref Range Status     Sodium 06/05/2019 140  133 - 144 mmol/L Final     Potassium 06/05/2019 4.4  3.4 - 5.3 mmol/L Final     Chloride 06/05/2019 105  94 - 109 mmol/L Final     Carbon Dioxide 06/05/2019 29  20 - 32 mmol/L Final     Anion Gap 06/05/2019 6  3 - 14 mmol/L Final     Glucose 06/05/2019 87  70 - 99 mg/dL Final     Urea Nitrogen 06/05/2019 14  7 - 30 mg/dL Final     Creatinine 06/05/2019 1.47* 0.66 - 1.25 mg/dL Final     GFR Estimate 06/05/2019 55* >60 mL/min/[1.73_m2] Final    Comment: Non  GFR Calc  Starting 12/18/2018, serum creatinine based estimated GFR (eGFR) will be   calculated using the Chronic Kidney Disease Epidemiology Collaboration   (CKD-EPI) equation.       GFR Estimate If Black 06/05/2019 63  >60 mL/min/[1.73_m2] Final    Comment:  GFR Calc  Starting 12/18/2018, serum creatinine based estimated GFR (eGFR) will be   calculated using the Chronic Kidney Disease Epidemiology Collaboration   (CKD-EPI) equation.       Calcium 06/05/2019 8.7  8.5 - 10.1 mg/dL Final     WBC 06/05/2019 7.5  4.0 - 11.0 10e9/L Final     RBC Count 06/05/2019 4.18* 4.4 - 5.9  10e12/L Final     Hemoglobin 06/05/2019 14.0  13.3 - 17.7 g/dL Final     Hematocrit 06/05/2019 40.6  40.0 - 53.0 % Final     MCV 06/05/2019 97  78 - 100 fl Final     MCH 06/05/2019 33.5* 26.5 - 33.0 pg Final     MCHC 06/05/2019 34.5  31.5 - 36.5 g/dL Final     RDW 06/05/2019 11.3  10.0 - 15.0 % Final     Platelet Count 06/05/2019 198  150 - 450 10e9/L Final         Recent Labs   Lab Test 10/22/18  0626 04/24/18  0737  10/14/16  0625  10/12/16  0706 08/04/16  0934  06/26/15  0821   HGB  --  14.2  --  11.7*   < > 15.1 14.8   < > 13.2*   PLT  --  191  --   --   --  196 181  --  209   INR  --   --   --   --   --  1.06 1.00  --  1.09   NA  --  140  --   --   --  140 138  --  140   POTASSIUM  --  4.1  --   --   --  4.0 4.2  --  4.0   CR 1.63* 1.51*   < > 1.69*  --  1.08 0.93  --  1.00   A1C  --   --   --   --   --   --  5.1  --  5.1    < > = values in this interval not displayed.        IMPRESSION:   Reason for surgery/procedure: Left eustachian tube dysfunction with recurrent otitis media with effusion  Diagnosis/reason for consult: Preoperative history and physical    The proposed surgical procedure is considered LOW risk.    REVISED CARDIAC RISK INDEX  The patient has the following serious cardiovascular risks for perioperative complications such as (MI, PE, VFib and 3  AV Block):  No serious cardiac risks  INTERPRETATION: 0 risks: Class I (very low risk - 0.4% complication rate)    The patient has the following additional risks for perioperative complications:  History of kidney donation (to brother) and elevated creatinine      ICD-10-CM    1. Preop general physical exam Z01.818 Basic metabolic panel     CBC with platelets   2. Dysfunction of left eustachian tube H69.82    3. OME (otitis media with effusion), left H65.92    4. CKD (chronic kidney disease) stage 3, GFR 30-59 ml/min (H) N18.3    5. Donor of kidney for transplant Z52.4        RECOMMENDATIONS:       APPROVAL GIVEN to proceed with proposed procedure,  without further diagnostic evaluation       Signed Electronically by: To Whitt MD    Copy of this evaluation report is provided to requesting physician.    [place] Preop Guidelines    Revised Cardiac Risk Index

## 2019-06-05 NOTE — PROGRESS NOTES
Suhail Mustafa is seen because his PE tube fell out of his left ear.  He thinks it happened about 6 weeks ago because he could no longer taste his drops.  Since then, his ear has gotten progressively araiza and more painful.  He can still wear his hearing aid with good effect.  No otorrhea or vertigo.    Review of systems:  He says that he's had more nasal drainage since his PE tube has been out but he's not sure if it's because the tube is out or because of seasonal allergies.    Physical examination:  male in no acute distress.  Alert and answering questions appropriately.  HB 1/6 bilaterally.  Left ear examined under the microscope.  Paparella tube sitting in the ear canal and this was removed.  The entire TM is very thick with no landmarks.    Assessment and plan:  Discussion was had regarding PE tube placement on the left.  He would like to proceed and consent was obtained.      Procedure:  Time Out was performed with confirmation of the patient, site and procedure.  Left ear examined under the microscope.  Phenol placed on the entire anterior quadrant.  Myringotomy incision first made in the anterior superior quadrant.  The TM has a thick plaque of myringosclerosis under the epithelium.  I was able to enter the middle ear and a thick mucoid effusion was suctioned out of the middle ear.  This did improve his fullness.  However, I was unable to place even a silicone Paparella tube due to the slit like nature of the myringotomy opening.  I tried another myringotomy in the inferior quadrant and the TM was also very thick in this area and I was only able to make a small opening which again was unable to fit a Paparella tube.    We discussed going to the operating room and performing a laser myringotomy with placement of a large bore Paparella tube under MAC.  The risks and benefits were discussed.  Risks include but are not limited to:  Drainage, tympanic membrane perforation requiring future repair and need for  further tube placement.  He expressed understanding and is agreeable to proceed.

## 2019-06-06 LAB
ANION GAP SERPL CALCULATED.3IONS-SCNC: 6 MMOL/L (ref 3–14)
BUN SERPL-MCNC: 14 MG/DL (ref 7–30)
CALCIUM SERPL-MCNC: 8.7 MG/DL (ref 8.5–10.1)
CHLORIDE SERPL-SCNC: 105 MMOL/L (ref 94–109)
CO2 SERPL-SCNC: 29 MMOL/L (ref 20–32)
CREAT SERPL-MCNC: 1.47 MG/DL (ref 0.66–1.25)
GFR SERPL CREATININE-BSD FRML MDRD: 55 ML/MIN/{1.73_M2}
GLUCOSE SERPL-MCNC: 87 MG/DL (ref 70–99)
POTASSIUM SERPL-SCNC: 4.4 MMOL/L (ref 3.4–5.3)
SODIUM SERPL-SCNC: 140 MMOL/L (ref 133–144)

## 2019-06-06 NOTE — RESULT ENCOUNTER NOTE
Suhail,  These lab results look fairly stable and/or fine for your planned procedure next week.    To Whitt MD

## 2019-06-11 ENCOUNTER — TELEPHONE (OUTPATIENT)
Dept: OTOLARYNGOLOGY | Facility: CLINIC | Age: 49
End: 2019-06-11

## 2019-06-11 ENCOUNTER — ANESTHESIA EVENT (OUTPATIENT)
Dept: SURGERY | Facility: AMBULATORY SURGERY CENTER | Age: 49
End: 2019-06-11

## 2019-06-11 NOTE — ANESTHESIA PREPROCEDURE EVALUATION
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 2013    Vasectomy     LAPAROSCOPIC DONOR HAND ASSISTED KIDNEY LIVING RELATED N/A 10/13/2016    Procedure: LAPAROSCOPIC DONOR HAND ASSISTED KIDNEY LIVING RELATED;  Surgeon: Tigre Quarles MD;  [facility]     LASER DIODE TYMPANOMASTOIDECTOMY Left 3/31/2015    Procedure: LASER DIODE TYMPANOMASTOIDECTOMY;  Surgeon: Yaneth Rogel MD;  [facility]     ORTHOPEDIC SURGERY  child    dislocated hip     OSSICULAR CHAIN RECONSTRUCTION N/A 10/9/2015    Procedure: OSSICULAR CHAIN RECONSTRUCTION;  Surgeon: Yaneth Rogel MD;  [facility]: [facility]     TYMPANOPLASTY Left 10/9/2015    Procedure: TYMPANOPLASTY;  Surgeon: Yanteh Rogel MD;  Location: [facility]          Anesthesia Evaluation     . Pt has had prior anesthetic.     No history of anesthetic complications          ROS/MED HX    ENT/Pulmonary: Comment: Presenting for laser myringotomy with tube placement      Neurologic: Comment:  Hearing loss      Cardiovascular:     (+) Dyslipidemia, ----. : . . . :. .       METS/Exercise Tolerance:  >4 METS   Hematologic:  - neg hematologic  ROS       Musculoskeletal:  - neg musculoskeletal ROS       GI/Hepatic:  - neg GI/hepatic ROS       Renal/Genitourinary: Comment: Donated kidney to brother in 2016, has subsequently developed mild kidney disease with creatinine of 1.47        Endo:  - neg endo ROS       Psychiatric:         Infectious Disease:  - neg infectious disease ROS       Malignancy:      - no malignancy   Other:    - neg other ROS                     PHYSICAL EXAM:   Mental Status/Neuro: A/A/O   Airway: Facies: Feasible  Mallampati: II  Mouth/Opening: Full  TM distance: > 6 cm  Neck ROM: Full   Respiratory: Auscultation: CTAB     Resp. Rate: Normal     Resp. Effort: Normal      CV: Rhythm: Regular  Rate: Age appropriate  Heart: Normal Sounds   Comments:      Dental: Normal                  Lab Results   Component Value Date    WBC 7.5 06/05/2019    HGB 14.0 06/05/2019    HCT 40.6 06/05/2019     06/05/2019     06/05/2019    POTASSIUM 4.4 06/05/2019    CHLORIDE 105 06/05/2019    CO2 29 06/05/2019    BUN 14 06/05/2019    CR 1.47 (H) 06/05/2019    GLC 87 06/05/2019    ANA 8.7 06/05/2019    PHOS 2.1 (L) 10/12/2016    ALBUMIN 3.8 08/04/2016    PROTTOTAL 7.1 08/04/2016    ALT 25 04/24/2018    AST 24 04/24/2018    ALKPHOS 51 08/04/2016    BILITOTAL 0.6 08/04/2016    PTT 27 10/12/2016    INR 1.06 10/12/2016    CKTOTAL 136 04/05/2011    CKMB <7 04/05/2011       Preop Vitals  BP Readings from Last 3 Encounters:   06/05/19 117/72   06/05/19 112/78   11/16/18 110/70    Pulse Readings from Last 3 Encounters:   06/05/19 54   06/05/19 69   11/16/18 66      Resp Readings from Last 3 Encounters:   06/05/19 12   11/16/18 18   08/09/18 13    SpO2 Readings from Last 3 Encounters:   06/05/19 97%   11/16/18 100%   11/08/18 99%      Temp Readings from Last 1 Encounters:   06/05/19 37  C (98.6  F) (Oral)  "   Ht Readings from Last 1 Encounters:   06/05/19 1.8 m (5' 10.87\")      Wt Readings from Last 1 Encounters:   06/05/19 92.5 kg (204 lb)    Estimated body mass index is 28.56 kg/m  as calculated from the following:    Height as of 6/5/19: 1.8 m (5' 10.87\").    Weight as of 6/5/19: 92.5 kg (204 lb).     LDA:            Assessment:   ASA SCORE: 2    NPO Status: > 6 hours since completed [employer]   Documentation: H&P complete; Preop Testing complete; Consents complete   Proceeding: Proceed without further delay  Tobacco Use:  NO Active use of Tobacco/UNKNOWN Tobacco use status     Plan:   Anes. Type:  General   Pre-Induction: Midazolam IV; Acetaminophen PO   Induction:  IV (Standard)   Airway: LMA   Access/Monitoring: PIV   Maintenance: Balanced   Emergence: Procedure Site   Logistics: Same Day Surgery     Postop Pain/Sedation Strategy:  Standard-Options: Opioids PRN     PONV Management:  Adult Risk Factors:, Non-Smoker, Postop Opioids  Prevention: Ondansetron; Dexamethasone     CONSENT: Direct conversation   Plan and risks discussed with: Patient   Blood Products: Consent Deferred (Minimal Blood Loss)                         To Granda MD  "

## 2019-06-11 NOTE — TELEPHONE ENCOUNTER
Call placed to patient to discuss post-op activities.  Informed patient that people react differently to general anesthesia, however, from a pain perspective, patient should feel good enough for an outing by tomorrow evening.  Reminded patient that following general anesthesia, he needs to refrain from driving for 24 hours.  Patient verbalized understanding and had no further questions.     Jesu Leon RN

## 2019-06-11 NOTE — TELEPHONE ENCOUNTER
[facility] Call Center    Phone Message    May a detailed message be left on voicemail: yes    Reason for Call: Other: pt is wondering if he would be able to go to a [employer] game tomorrow night seeing as he has surgery that morning. Patient wants to know if he will be in good enough condition to go. Please call.      Action Taken: Message routed to:  Other: ent

## 2019-06-12 ENCOUNTER — HOSPITAL ENCOUNTER (OUTPATIENT)
Facility: AMBULATORY SURGERY CENTER | Age: 49
End: 2019-06-12
Attending: OTOLARYNGOLOGY
Payer: COMMERCIAL

## 2019-06-12 ENCOUNTER — ANESTHESIA (OUTPATIENT)
Dept: SURGERY | Facility: AMBULATORY SURGERY CENTER | Age: 49
End: 2019-06-12

## 2019-06-12 VITALS
HEART RATE: 73 BPM | TEMPERATURE: 97.6 F | RESPIRATION RATE: 16 BRPM | SYSTOLIC BLOOD PRESSURE: 122 MMHG | DIASTOLIC BLOOD PRESSURE: 73 MMHG | OXYGEN SATURATION: 97 %

## 2019-06-12 DIAGNOSIS — H60.319 ACUTE DIFFUSE OTITIS EXTERNA, UNSPECIFIED LATERALITY: Primary | ICD-10-CM

## 2019-06-12 RX ORDER — LABETALOL 20 MG/4 ML (5 MG/ML) INTRAVENOUS SYRINGE
10
Status: DISCONTINUED | OUTPATIENT
Start: 2019-06-12 | End: 2019-06-12 | Stop reason: HOSPADM

## 2019-06-12 RX ORDER — SODIUM CHLORIDE, SODIUM LACTATE, POTASSIUM CHLORIDE, CALCIUM CHLORIDE 600; 310; 30; 20 MG/100ML; MG/100ML; MG/100ML; MG/100ML
INJECTION, SOLUTION INTRAVENOUS CONTINUOUS
Status: DISCONTINUED | OUTPATIENT
Start: 2019-06-12 | End: 2019-06-12 | Stop reason: HOSPADM

## 2019-06-12 RX ORDER — KETOROLAC TROMETHAMINE 30 MG/ML
30 INJECTION, SOLUTION INTRAMUSCULAR; INTRAVENOUS EVERY 6 HOURS PRN
Status: DISCONTINUED | OUTPATIENT
Start: 2019-06-12 | End: 2019-06-13 | Stop reason: HOSPADM

## 2019-06-12 RX ORDER — ALBUTEROL SULFATE 0.83 MG/ML
2.5 SOLUTION RESPIRATORY (INHALATION) EVERY 4 HOURS PRN
Status: DISCONTINUED | OUTPATIENT
Start: 2019-06-12 | End: 2019-06-12 | Stop reason: HOSPADM

## 2019-06-12 RX ORDER — ONDANSETRON 4 MG/1
4 TABLET, ORALLY DISINTEGRATING ORAL EVERY 30 MIN PRN
Status: DISCONTINUED | OUTPATIENT
Start: 2019-06-12 | End: 2019-06-13 | Stop reason: HOSPADM

## 2019-06-12 RX ORDER — ONDANSETRON 2 MG/ML
4 INJECTION INTRAMUSCULAR; INTRAVENOUS EVERY 30 MIN PRN
Status: DISCONTINUED | OUTPATIENT
Start: 2019-06-12 | End: 2019-06-13 | Stop reason: HOSPADM

## 2019-06-12 RX ORDER — HYDROMORPHONE HYDROCHLORIDE 1 MG/ML
.3-.5 INJECTION, SOLUTION INTRAMUSCULAR; INTRAVENOUS; SUBCUTANEOUS EVERY 10 MIN PRN
Status: DISCONTINUED | OUTPATIENT
Start: 2019-06-12 | End: 2019-06-13 | Stop reason: HOSPADM

## 2019-06-12 RX ORDER — GABAPENTIN 300 MG/1
300 CAPSULE ORAL ONCE
Status: COMPLETED | OUTPATIENT
Start: 2019-06-12 | End: 2019-06-12

## 2019-06-12 RX ORDER — NALOXONE HYDROCHLORIDE 0.4 MG/ML
.1-.4 INJECTION, SOLUTION INTRAMUSCULAR; INTRAVENOUS; SUBCUTANEOUS
Status: DISCONTINUED | OUTPATIENT
Start: 2019-06-12 | End: 2019-06-13 | Stop reason: HOSPADM

## 2019-06-12 RX ORDER — PROPOFOL 10 MG/ML
INJECTION, EMULSION INTRAVENOUS PRN
Status: DISCONTINUED | OUTPATIENT
Start: 2019-06-12 | End: 2019-06-12

## 2019-06-12 RX ORDER — DEXAMETHASONE SODIUM PHOSPHATE 4 MG/ML
4 INJECTION, SOLUTION INTRA-ARTICULAR; INTRALESIONAL; INTRAMUSCULAR; INTRAVENOUS; SOFT TISSUE EVERY 10 MIN PRN
Status: DISCONTINUED | OUTPATIENT
Start: 2019-06-12 | End: 2019-06-13 | Stop reason: HOSPADM

## 2019-06-12 RX ORDER — LIDOCAINE HYDROCHLORIDE 20 MG/ML
INJECTION, SOLUTION INFILTRATION; PERINEURAL PRN
Status: DISCONTINUED | OUTPATIENT
Start: 2019-06-12 | End: 2019-06-12

## 2019-06-12 RX ORDER — FENTANYL CITRATE 50 UG/ML
INJECTION, SOLUTION INTRAMUSCULAR; INTRAVENOUS PRN
Status: DISCONTINUED | OUTPATIENT
Start: 2019-06-12 | End: 2019-06-12

## 2019-06-12 RX ORDER — FENTANYL CITRATE 50 UG/ML
25-50 INJECTION, SOLUTION INTRAMUSCULAR; INTRAVENOUS
Status: DISCONTINUED | OUTPATIENT
Start: 2019-06-12 | End: 2019-06-12 | Stop reason: HOSPADM

## 2019-06-12 RX ORDER — HYDRALAZINE HYDROCHLORIDE 20 MG/ML
2.5-5 INJECTION INTRAMUSCULAR; INTRAVENOUS EVERY 10 MIN PRN
Status: DISCONTINUED | OUTPATIENT
Start: 2019-06-12 | End: 2019-06-12 | Stop reason: HOSPADM

## 2019-06-12 RX ORDER — ACETAMINOPHEN 325 MG/1
975 TABLET ORAL ONCE
Status: COMPLETED | OUTPATIENT
Start: 2019-06-12 | End: 2019-06-12

## 2019-06-12 RX ORDER — FENTANYL CITRATE 50 UG/ML
25-50 INJECTION, SOLUTION INTRAMUSCULAR; INTRAVENOUS
Status: DISCONTINUED | OUTPATIENT
Start: 2019-06-12 | End: 2019-06-13 | Stop reason: HOSPADM

## 2019-06-12 RX ORDER — ACETAMINOPHEN 325 MG/1
650 TABLET ORAL
Status: DISCONTINUED | OUTPATIENT
Start: 2019-06-12 | End: 2019-06-13 | Stop reason: HOSPADM

## 2019-06-12 RX ORDER — SODIUM CHLORIDE, SODIUM LACTATE, POTASSIUM CHLORIDE, CALCIUM CHLORIDE 600; 310; 30; 20 MG/100ML; MG/100ML; MG/100ML; MG/100ML
INJECTION, SOLUTION INTRAVENOUS CONTINUOUS
Status: DISCONTINUED | OUTPATIENT
Start: 2019-06-12 | End: 2019-06-13 | Stop reason: HOSPADM

## 2019-06-12 RX ORDER — DEXAMETHASONE SODIUM PHOSPHATE 4 MG/ML
INJECTION, SOLUTION INTRA-ARTICULAR; INTRALESIONAL; INTRAMUSCULAR; INTRAVENOUS; SOFT TISSUE PRN
Status: DISCONTINUED | OUTPATIENT
Start: 2019-06-12 | End: 2019-06-12

## 2019-06-12 RX ORDER — MEPERIDINE HYDROCHLORIDE 25 MG/ML
12.5 INJECTION INTRAMUSCULAR; INTRAVENOUS; SUBCUTANEOUS
Status: DISCONTINUED | OUTPATIENT
Start: 2019-06-12 | End: 2019-06-13 | Stop reason: HOSPADM

## 2019-06-12 RX ORDER — ONDANSETRON 2 MG/ML
INJECTION INTRAMUSCULAR; INTRAVENOUS PRN
Status: DISCONTINUED | OUTPATIENT
Start: 2019-06-12 | End: 2019-06-12

## 2019-06-12 RX ORDER — OFLOXACIN 3 MG/ML
SOLUTION/ DROPS OPHTHALMIC PRN
Status: DISCONTINUED | OUTPATIENT
Start: 2019-06-12 | End: 2019-06-12 | Stop reason: HOSPADM

## 2019-06-12 RX ORDER — OXYCODONE HYDROCHLORIDE 5 MG/1
5-10 TABLET ORAL EVERY 4 HOURS PRN
Status: DISCONTINUED | OUTPATIENT
Start: 2019-06-12 | End: 2019-06-13 | Stop reason: HOSPADM

## 2019-06-12 RX ADMIN — PROPOFOL 200 MG: 10 INJECTION, EMULSION INTRAVENOUS at 07:13

## 2019-06-12 RX ADMIN — SODIUM CHLORIDE, SODIUM LACTATE, POTASSIUM CHLORIDE, CALCIUM CHLORIDE: 600; 310; 30; 20 INJECTION, SOLUTION INTRAVENOUS at 06:15

## 2019-06-12 RX ADMIN — ONDANSETRON 4 MG: 2 INJECTION INTRAMUSCULAR; INTRAVENOUS at 07:22

## 2019-06-12 RX ADMIN — LIDOCAINE HYDROCHLORIDE 100 MG: 20 INJECTION, SOLUTION INFILTRATION; PERINEURAL at 07:13

## 2019-06-12 RX ADMIN — FENTANYL CITRATE 25 MCG: 50 INJECTION, SOLUTION INTRAMUSCULAR; INTRAVENOUS at 07:40

## 2019-06-12 RX ADMIN — GABAPENTIN 300 MG: 300 CAPSULE ORAL at 06:15

## 2019-06-12 RX ADMIN — ACETAMINOPHEN 975 MG: 325 TABLET ORAL at 06:15

## 2019-06-12 RX ADMIN — DEXAMETHASONE SODIUM PHOSPHATE 4 MG: 4 INJECTION, SOLUTION INTRA-ARTICULAR; INTRALESIONAL; INTRAMUSCULAR; INTRAVENOUS; SOFT TISSUE at 07:22

## 2019-06-12 RX ADMIN — FENTANYL CITRATE 50 MCG: 50 INJECTION, SOLUTION INTRAMUSCULAR; INTRAVENOUS at 07:10

## 2019-06-12 NOTE — BRIEF OP NOTE
[facility] [place]    Brief Operative Note    Pre-operative diagnosis: Otorrhea  Post-operative diagnosis * No post-op diagnosis entered *  Procedure: Procedure(s):  Laser Myringotomy with Left Pressure Equalization Tube  Surgeon: Surgeon(s) and Role:     * Yaneth Rogel MD - Primary  Anesthesia: General   Estimated blood loss: 5cc  Drains: None  Specimens: * No specimens in log *  Findings:   See op note  Complications: None.  Implants:    Implant Name Type Inv. Item Serial No.  Lot No. LRB No. Used   Paparella Tube     [id number] Left 1   T-tube     [id number] Left 1

## 2019-06-12 NOTE — ANESTHESIA CARE TRANSFER NOTE
Patient: Suhail Mustafa    Procedure(s):  Laser Myringotomy with Diode Laser and T-tube Placement    Diagnosis: Otorrhea  Diagnosis Additional Information: No value filed.    Anesthesia Type:   No value filed.     Note:  Airway :Face Mask  Patient transferred to:PACU  Comments: VSS/WNL. Responds to name.Handoff Report: Identifed the Patient, Identified the Reponsible Provider, Reviewed the pertinent medical history, Discussed the surgical course, Reviewed Intra-OP anesthesia mangement and issues during anesthesia, Set expectations for post-procedure period and Allowed opportunity for questions and acknowledgement of understanding      Vitals: (Last set prior to Anesthesia Care Transfer)    CRNA VITALS  6/12/2019 0723 - 6/12/2019 0759      6/12/2019             Pulse:  79    SpO2:  100 %    Resp Rate (observed):  11                Electronically Signed By: FREEDOM Brewer CRNA  June 12, 2019  7:59 AM

## 2019-06-12 NOTE — OP NOTE
Date of service:  6/12/19    Preoperative diagnosis:  eustachian tube dysfunction    Postoperative diagnosis:  eustachian tube dysfunction    Procedure:  Left myringotomy with the diode laser and tube placement    Surgeon:  Yaneth Rogel MD    Anesthesia:  General    Complications:  None    EBL:  None    Specimens:  None    Findings:  Left otitis externa with a edematous ear canal and TM, extremely thick TM, middle ear with scar.     Indications:  Suhail Mustafa is a 48 year old male with left eustachian tube dysfunction s/p cartilage tympanoplasty and PORP placement.  The above procedure was discussed with the patient and consent was obtained.    Procedure:  Patient was taken to the operating room and placed supine on the operating table.  After general anesthesia was induced and a LMA placed, Time Out was then performed with confirmation of the patient, site and procedure.  The operating microscope was brought into position and the left ear was examined.  The ear canal is quite edematous as is the TM.  Some moist cerumen was removed.  It was impossible to see the anterior quadrant due to the edema and his already fairly narrow canal.  The diode laser at a setting of 500mW continuous was used to make a myringotomy.  This was quite difficult due to the thickness of the TM.  The laser was then used to make a myringotomy through the cartilage.  The inferior edge of the PORP was exposed.  The PORP has scar around it.  A middle ear space was identified after removal of some scar.  A t-tube with the legs shortened was able to be placed although this was also somewhat difficult due to the small middle ear space.  Floxin instilled into the ear canal.  The patient tolerated the procedure well with no complications.  Awakened and taken to the PACU in stable condition.

## 2019-06-25 NOTE — NURSING NOTE
Reason For Visit:   Chief Complaint   Patient presents with     Surgical Followup     6 week pop left arthroscopic extensive glenohumeral debridement and open biceps tenodesis.  DOS: 11/16/2018       PCP: To Whitt  Ref: Dr. To Callaway     ?  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  Work status?  Full time.  Date of injury: 1990s  Type of injury: dislocated his shoulder playing boot hockey.  Pain started to increase about 3 years ago.  Date of surgery: NA  Type of surgery: NA.  Smoker: No  Request smoking cessation information: No     Right hand dominant    SANE score  Affected shoulder: Left  Right shoulder SANE: 100  Left shoulder SANE: 75    There were no vitals taken for this visit.      Pain Assessment  Patient Currently in Pain: Denies(a little sore after PT)    Jewels Corbett ATC         Patent

## 2019-07-10 DIAGNOSIS — H91.90 HEARING LOSS: Primary | ICD-10-CM

## 2019-07-25 ENCOUNTER — OFFICE VISIT (OUTPATIENT)
Dept: OTOLARYNGOLOGY | Facility: CLINIC | Age: 49
End: 2019-07-25
Payer: COMMERCIAL

## 2019-07-25 ENCOUNTER — OFFICE VISIT (OUTPATIENT)
Dept: AUDIOLOGY | Facility: CLINIC | Age: 49
End: 2019-07-25
Payer: COMMERCIAL

## 2019-07-25 VITALS
BODY MASS INDEX: 27.02 KG/M2 | OXYGEN SATURATION: 100 % | SYSTOLIC BLOOD PRESSURE: 118 MMHG | DIASTOLIC BLOOD PRESSURE: 85 MMHG | HEART RATE: 64 BPM | WEIGHT: 193 LBS

## 2019-07-25 DIAGNOSIS — H90.72 MIXED CONDUCTIVE AND SENSORINEURAL HEARING LOSS OF LEFT EAR WITH UNRESTRICTED HEARING OF RIGHT EAR: Primary | ICD-10-CM

## 2019-07-25 DIAGNOSIS — H69.92 DYSFUNCTION OF LEFT EUSTACHIAN TUBE: Primary | ICD-10-CM

## 2019-07-25 ASSESSMENT — PAIN SCALES - GENERAL: PAINLEVEL: NO PAIN (0)

## 2019-07-25 NOTE — PROGRESS NOTES
CC: Post op check    Interval History: Suhail Mustafa is seen for his first post op visit after left myringotomy with the diode laser and T-tube placement. He has been doing well since surgery. He has previously undergone left tympanomastoidectomy (3/31/2015) and left tympanoplasty with PORP placement (10/9/2015). He reports that he has been using antibiotic otic drops every 3 days because he feels as though his ear becomes clogged without them. Denies otalgia, otorrhea. Reports that aural fullness is unchanged. He uses a hearing aid in the left ear.    Physical examination:  male in no acute distress.  Alert and answering questions appropriately.  HB 1/6 bilaterally.  Left ear examined under the microscope. T-tube in place, patent, dry, TM thickened with no evidence of middle ear effusion.    Assessment and plan:  He is now s/p left myringotomy with the diode laser and T-tube placement. The tube is in adequate position and dry. His audio demonstrates improvement in low frequency hearing. We will see him back in 6 months.    Kalyan Del Toro MD  Neurotology Fellow  Department of Otolaryngology - Head and Neck Surgery  [facility]    I, Yaneth Rogel, saw this patient with the resident/fellow and agree with the resident/fellow s findings and plan of care as documented in the resident s/fellow s note. I was present for the entire procedure.    Yaneth Rogel MD

## 2019-07-25 NOTE — LETTER
7/25/2019      RE: Suhail Mustafa  [address]   [place] [address]       CC: Post op check    Interval History: Suhail Mustafa is seen for his first post op visit after left myringotomy with the diode laser and T-tube placement. He has been doing well since surgery. He has previously undergone left tympanomastoidectomy (3/31/2015) and left tympanoplasty with PORP placement (10/9/2015). He reports that he has been using antibiotic otic drops every 3 days because he feels as though his ear becomes clogged without them. Denies otalgia, otorrhea. Reports that aural fullness is unchanged. He uses a hearing aid in the left ear.    Physical examination:  male in no acute distress.  Alert and answering questions appropriately.  HB 1/6 bilaterally.  Left ear examined under the microscope. T-tube in place, patent, dry, TM thickened with no evidence of middle ear effusion.    Assessment and plan:  He is now s/p left myringotomy with the diode laser and T-tube placement. The tube is in adequate position and dry. His audio demonstrates improvement in low frequency hearing. We will see him back in 6 months.    Kalyan Del Toro MD  Neurotology Fellow  Department of Otolaryngology - Head and Neck Surgery  [facility]    I, Yaneth Rogel, saw this patient with the resident/fellow and agree with the resident/fellow s findings and plan of care as documented in the resident s/fellow s note. I was present for the entire procedure.    MD Yaneth Stanley MD

## 2019-07-25 NOTE — Clinical Note
7/25/2019       RE: Suhail Mustafa  [address] [place] [address]     Dear Colleague,    Thank you for referring your patient, Suhail Mustafa, to the [facility] EAR NOSE AND THROAT at [facility]. Please see a copy of my visit note below.    CC: Post op check    Interval History: Suhail Mustafa is seen for his first post op visit. He underwent left myringotomy with the diode laser and T-tube placement. He has been doing well since surgery. He has previously undergone left tympanomastoidectomy (3/31/2015) and left tympanoplasty with PORP placement (10/9/2015). He reports that he has been using antibiotic otic drops every 3 days because he feels as though his ear becomes clogged without them. Denies otalgia, otorrhea. Reports that aural fullness is unchanged. He uses a hearing aid in the left ear.    Physical examination:  male in no acute distress.  Alert and answering questions appropriately.  HB 1/6 bilaterally.  Left ear examined under the microscope. T-tube in place, patent, dry, TM thickened with no evidence of middle ear effusion.    Assessment and plan:  He is now s/p left myringotomy with the diode laser and T-tube placement. The tube is in adequate position and dry. His audio demonstrates improvement in low frequency hearing. We will see him back in 6 months.    Kalyan Del Toro MD  Neurotology Fellow  Department of Otolaryngology - Head and Neck Surgery  [facility]          Again, thank you for allowing me to participate in the care of your patient.      Sincerely,    Yaneth Rogel MD

## 2019-07-25 NOTE — PROGRESS NOTES
AUDIOLOGY REPORT    SUMMARY: Audiology visit completed. See audiogram for results.      RECOMMENDATIONS: Follow-up with ENT.      Osiel Lazaro.  Licensed Audiologist  [place] #[id number]

## 2019-07-25 NOTE — PATIENT INSTRUCTIONS
1. You were seen in the ENT Clinic today by .  If you have any questions or concerns after your appointment, please call   - Option 1: ENT Clinic: [phone]  - Option 2: Leia ('s Nurse): [phone]    2.   Plan to return to clinic 6 months without a new hearing test.    CAROLYNN Dupree  [facility] Otolaryngology  [phone]

## 2019-07-29 ENCOUNTER — TELEPHONE (OUTPATIENT)
Dept: OTOLARYNGOLOGY | Facility: CLINIC | Age: 49
End: 2019-07-29

## 2019-07-29 DIAGNOSIS — H69.92 DYSFUNCTION OF LEFT EUSTACHIAN TUBE: Primary | ICD-10-CM

## 2019-07-29 LAB
BACTERIA SPEC CULT: ABNORMAL
BACTERIA SPEC CULT: ABNORMAL
SPECIMEN SOURCE: ABNORMAL

## 2019-07-29 RX ORDER — CLOTRIMAZOLE 1 G/ML
SOLUTION TOPICAL
Qty: 10 ML | Refills: 0 | Status: SHIPPED | OUTPATIENT
Start: 2019-07-29 | End: 2019-10-08

## 2019-07-29 NOTE — TELEPHONE ENCOUNTER
Called and left message for patient informing him that his cultures showed he is growing candida (yeast) in left ear. Informed him that a prescription for clotrimazole drops was sent to [facility] in [facility] in [place]. Left call back information if patient has questions or concerns.

## 2019-07-29 NOTE — TELEPHONE ENCOUNTER
[facility] Call Center    Phone Message    May a detailed message be left on voicemail: yes    Reason for Call: Medication Question or concern regarding medication   Prescription Clarification  Name of Medication: clotrimazole (LOTRIMIN) 1 % external solution  Prescribing Provider: Juan F   Pharmacy: [facility]   What on the order needs clarification? The pharmacy doesn't think this can be used in the ear. Please call them to clarify as soon as possible          Action Taken: Message routed to:  [facility] ([facility] ENT

## 2019-07-30 DIAGNOSIS — H69.92 DYSFUNCTION OF LEFT EUSTACHIAN TUBE: Primary | ICD-10-CM

## 2019-07-30 LAB
FUNGUS SPEC CULT: ABNORMAL
SPECIMEN SOURCE: ABNORMAL

## 2019-07-30 RX ORDER — CLOTRIMAZOLE 1 G/ML
SOLUTION TOPICAL
Qty: 15 ML | Refills: 0 | Status: SHIPPED | OUTPATIENT
Start: 2019-07-30 | End: 2019-10-08

## 2019-10-02 ASSESSMENT — ENCOUNTER SYMPTOMS
PARESTHESIAS: 0
SHORTNESS OF BREATH: 0
DYSURIA: 0
FEVER: 0
NERVOUS/ANXIOUS: 0
WEAKNESS: 0
MYALGIAS: 0
CONSTIPATION: 0
HEARTBURN: 0
ABDOMINAL PAIN: 0
PALPITATIONS: 0
SORE THROAT: 0
HEMATURIA: 0
EYE PAIN: 0
JOINT SWELLING: 0
COUGH: 0
HEADACHES: 0
ARTHRALGIAS: 0
NAUSEA: 0
FREQUENCY: 0
HEMATOCHEZIA: 0
DIARRHEA: 0
CHILLS: 0
DIZZINESS: 0

## 2019-10-04 ENCOUNTER — HEALTH MAINTENANCE LETTER (OUTPATIENT)
Age: 49
End: 2019-10-04

## 2019-10-08 ENCOUNTER — OFFICE VISIT (OUTPATIENT)
Dept: FAMILY MEDICINE | Facility: CLINIC | Age: 49
End: 2019-10-08
Payer: COMMERCIAL

## 2019-10-08 VITALS
HEART RATE: 79 BPM | DIASTOLIC BLOOD PRESSURE: 77 MMHG | OXYGEN SATURATION: 98 % | WEIGHT: 196 LBS | TEMPERATURE: 98.4 F | BODY MASS INDEX: 27.44 KG/M2 | SYSTOLIC BLOOD PRESSURE: 109 MMHG

## 2019-10-08 DIAGNOSIS — Z86.0101 HX OF ADENOMATOUS POLYP OF COLON: ICD-10-CM

## 2019-10-08 DIAGNOSIS — N18.30 CKD (CHRONIC KIDNEY DISEASE) STAGE 3, GFR 30-59 ML/MIN (H): ICD-10-CM

## 2019-10-08 DIAGNOSIS — Z23 NEED FOR PROPHYLACTIC VACCINATION AND INOCULATION AGAINST INFLUENZA: ICD-10-CM

## 2019-10-08 DIAGNOSIS — Z00.00 ROUTINE GENERAL MEDICAL EXAMINATION AT A HEALTH CARE FACILITY: Primary | ICD-10-CM

## 2019-10-08 DIAGNOSIS — H90.12 CONDUCTIVE HEARING LOSS OF LEFT EAR WITH UNRESTRICTED HEARING OF RIGHT EAR: ICD-10-CM

## 2019-10-08 DIAGNOSIS — E78.5 HYPERLIPIDEMIA LDL GOAL <100: ICD-10-CM

## 2019-10-08 DIAGNOSIS — Z52.4 DONOR OF KIDNEY FOR TRANSPLANT: ICD-10-CM

## 2019-10-08 LAB
ALT SERPL W P-5'-P-CCNC: 42 U/L (ref 0–70)
ANION GAP SERPL CALCULATED.3IONS-SCNC: 3 MMOL/L (ref 3–14)
BUN SERPL-MCNC: 17 MG/DL (ref 7–30)
CALCIUM SERPL-MCNC: 9.1 MG/DL (ref 8.5–10.1)
CHLORIDE SERPL-SCNC: 105 MMOL/L (ref 94–109)
CHOLEST SERPL-MCNC: 149 MG/DL
CO2 SERPL-SCNC: 30 MMOL/L (ref 20–32)
CREAT SERPL-MCNC: 1.49 MG/DL (ref 0.66–1.25)
DEPRECATED CALCIDIOL+CALCIFEROL SERPL-MC: 25 UG/L (ref 20–75)
ERYTHROCYTE [DISTWIDTH] IN BLOOD BY AUTOMATED COUNT: 11.2 % (ref 10–15)
GFR SERPL CREATININE-BSD FRML MDRD: 54 ML/MIN/{1.73_M2}
GLUCOSE SERPL-MCNC: 97 MG/DL (ref 70–99)
HCT VFR BLD AUTO: 44 % (ref 40–53)
HDLC SERPL-MCNC: 44 MG/DL
HGB BLD-MCNC: 15.3 G/DL (ref 13.3–17.7)
LDLC SERPL CALC-MCNC: 89 MG/DL
MCH RBC QN AUTO: 32.7 PG (ref 26.5–33)
MCHC RBC AUTO-ENTMCNC: 34.8 G/DL (ref 31.5–36.5)
MCV RBC AUTO: 94 FL (ref 78–100)
NONHDLC SERPL-MCNC: 105 MG/DL
PLATELET # BLD AUTO: 198 10E9/L (ref 150–450)
POTASSIUM SERPL-SCNC: 4.2 MMOL/L (ref 3.4–5.3)
RBC # BLD AUTO: 4.68 10E12/L (ref 4.4–5.9)
SODIUM SERPL-SCNC: 138 MMOL/L (ref 133–144)
TRIGL SERPL-MCNC: 81 MG/DL
WBC # BLD AUTO: 4.7 10E9/L (ref 4–11)

## 2019-10-08 PROCEDURE — 85027 COMPLETE CBC AUTOMATED: CPT | Performed by: FAMILY MEDICINE

## 2019-10-08 PROCEDURE — 99213 OFFICE O/P EST LOW 20 MIN: CPT | Mod: 25 | Performed by: FAMILY MEDICINE

## 2019-10-08 PROCEDURE — 36415 COLL VENOUS BLD VENIPUNCTURE: CPT | Performed by: FAMILY MEDICINE

## 2019-10-08 PROCEDURE — 82043 UR ALBUMIN QUANTITATIVE: CPT | Performed by: FAMILY MEDICINE

## 2019-10-08 PROCEDURE — 90472 IMMUNIZATION ADMIN EACH ADD: CPT | Performed by: FAMILY MEDICINE

## 2019-10-08 PROCEDURE — 90714 TD VACC NO PRESV 7 YRS+ IM: CPT | Performed by: FAMILY MEDICINE

## 2019-10-08 PROCEDURE — 90682 RIV4 VACC RECOMBINANT DNA IM: CPT | Performed by: FAMILY MEDICINE

## 2019-10-08 PROCEDURE — 99396 PREV VISIT EST AGE 40-64: CPT | Mod: 25 | Performed by: FAMILY MEDICINE

## 2019-10-08 PROCEDURE — 84460 ALANINE AMINO (ALT) (SGPT): CPT | Performed by: FAMILY MEDICINE

## 2019-10-08 PROCEDURE — 80061 LIPID PANEL: CPT | Performed by: FAMILY MEDICINE

## 2019-10-08 PROCEDURE — 82306 VITAMIN D 25 HYDROXY: CPT | Performed by: FAMILY MEDICINE

## 2019-10-08 PROCEDURE — 90471 IMMUNIZATION ADMIN: CPT | Performed by: FAMILY MEDICINE

## 2019-10-08 PROCEDURE — 80048 BASIC METABOLIC PNL TOTAL CA: CPT | Performed by: FAMILY MEDICINE

## 2019-10-08 RX ORDER — SIMVASTATIN 20 MG
20 TABLET ORAL AT BEDTIME
Qty: 90 TABLET | Refills: 3 | Status: SHIPPED | OUTPATIENT
Start: 2019-10-08 | End: 2021-01-05

## 2019-10-08 ASSESSMENT — ENCOUNTER SYMPTOMS
MYALGIAS: 0
CONSTIPATION: 0
SORE THROAT: 0
HEMATOCHEZIA: 0
NAUSEA: 0
NERVOUS/ANXIOUS: 0
COUGH: 0
FREQUENCY: 0
PARESTHESIAS: 0
EYE PAIN: 0
WEAKNESS: 0
ABDOMINAL PAIN: 0
JOINT SWELLING: 0
DIZZINESS: 0
HEMATURIA: 0
ARTHRALGIAS: 0
FEVER: 0
DIARRHEA: 0
SHORTNESS OF BREATH: 0
HEADACHES: 0
CHILLS: 0
DYSURIA: 0
HEARTBURN: 0
PALPITATIONS: 0

## 2019-10-08 NOTE — PROGRESS NOTES
SUBJECTIVE:   CC: Suhail Mustafa is an 49 year old male who presents for a preventative health visit and follow-up on baseline health conditions.     Healthy Habits:     Getting at least 3 servings of Calcium per day:  Yes    Bi-annual eye exam:  Yes    Dental care twice a year:  Yes    Sleep apnea or symptoms of sleep apnea:  None    Diet:  Regular (no restrictions)    Frequency of exercise:  4-5 days/week    Duration of exercise:  45-60 minutes    Taking medications regularly:  Yes    Medication side effects:  Not applicable    PHQ-2 Total Score: 0    Additional concerns today:  No      Would like to discuss CKD on problem list.    He donated a kidney a few years ago and his creatinine has been elevated from the normal range previously to about 1.4-1.6 since then.  GFR has been in the 45-55 range.  He remains on simvastatin for hyperlipidemia.  He has had chronic left ear problems and currently has a PE tube in the left TM and he wears a left hearing aid.  He had a colonoscopy last year with removal of an adenomatous polyp and a repeat exam was recommended in 5 years.  Review of his chart shows that he is due for a tetanus booster.  He is generally feeling well.    Today's PHQ-2 Score:   PHQ-2 ( 1999 Pfizer) 10/2/2019   Q1: Little interest or pleasure in doing things 0   Q2: Feeling down, depressed or hopeless 0   PHQ-2 Score 0   Q1: Little interest or pleasure in doing things Not at all   Q2: Feeling down, depressed or hopeless Not at all   PHQ-2 Score 0       Abuse: Current or Past(Physical, Sexual or Emotional)- No  Do you feel safe in your environment? Yes    Social History     Tobacco Use     Smoking status: Never Smoker     Smokeless tobacco: Never Used   Substance Use Topics     Alcohol use: Yes     Alcohol/week: 1.0 standard drinks     Types: 1 Standard drinks or equivalent per week     Comment: once per week with a meal         Alcohol Use 10/2/2019   Prescreen: >3 drinks/day or >7 drinks/week? No    Prescreen: >3 drinks/day or >7 drinks/week? -       Last PSA:   PSA   Date Value Ref Range Status   04/24/2018 1.76 0 - 4 ug/L Final     Comment:     Assay Method:  Chemiluminescence using Siemens Vista analyzer       Reviewed orders with patient. Reviewed health maintenance and updated orders accordingly - Yes  Patient Active Problem List   Diagnosis     FH: kidney cancer     Conductive hearing loss in left ear     Donor of kidney for transplant     Lichen simplex chronicus     ACP (advance care planning)     Acute pain of left shoulder     Hyperlipidemia LDL goal <100     Overweight (BMI 25.0-29.9)     CKD (chronic kidney disease) stage 3, GFR 30-59 ml/min (H)     Labral tear of shoulder, left, subsequent encounter     Shoulder instability, left     Past Surgical History:   Procedure Laterality Date     ARTHROSCOPY SHOULDER, OPEN BICEP TENODESIS REPAIR, COMBINED Left 11/16/2018    Procedure: Left Shoulder Diagnostic Arthroscopy, Labral Debridement, Open Biceps Tenodesis;  Surgeon: Anai Mata MD;  Location: [facility] OR     ENT SURGERY  multiple times    pe tubes     ENT SURGERY      tonsils     ENT SURGERY  11/2011    Tubes 5 times since last time 10/21/14     GENITOURINARY SURGERY  Oct. 2013    Vasectomy     LAPAROSCOPIC DONOR HAND ASSISTED KIDNEY LIVING RELATED N/A 10/13/2016    Procedure: LAPAROSCOPIC DONOR HAND ASSISTED KIDNEY LIVING RELATED;  Surgeon: Tigre Quarles MD;  Location: [facility] OR     LASER DIODE TYMPANOMASTOIDECTOMY Left 3/31/2015    Procedure: LASER DIODE TYMPANOMASTOIDECTOMY;  Surgeon: Yaneth Rogel MD;  Location: [facility] OR     LASER DIODE TYMPANOTOMY Left 6/12/2019    Procedure: Laser Myringotomy with Diode Laser and T-tube Placement;  Surgeon: Yaneth Rogel MD;  Location: [facility] OR     ORTHOPEDIC SURGERY  child    dislocated hip     OSSICULAR CHAIN RECONSTRUCTION N/A 10/9/2015    Procedure: OSSICULAR CHAIN RECONSTRUCTION;  Surgeon: Yaneth Rogel MD;  Location: [facility] OR     TYMPANOPLASTY Left  10/9/2015    Procedure: TYMPANOPLASTY;  Surgeon: Yaneth Rogel MD;  Location:  OR       Social History     Tobacco Use     Smoking status: Never Smoker     Smokeless tobacco: Never Used   Substance Use Topics     Alcohol use: Yes     Alcohol/week: 1.0 standard drinks     Types: 1 Standard drinks or equivalent per week     Comment: once per week with a meal     Family History   Problem Relation Age of Onset     Gastrointestinal Disease Mother         colostomy for diverticulitis     Cerebrovascular Disease Mother         after giving birth     Kidney Disease Brother         dysplasia     Genitourinary Problems Brother      Cardiovascular Brother         ablation     Hypertension Brother      Heart Disease Father      Genitourinary Problems Father      Diabetes Type 2  Father         obese     Cancer Paternal Grandfather         bone cancer     Aneurysm Paternal Uncle         brain aneurysm on coumadin     Hypertension Brother          Current Outpatient Medications   Medication Sig Dispense Refill     acetaminophen (TYLENOL) 325 MG tablet Take 2 tablets (650 mg) by mouth every 4 hours as needed for mild pain 50 tablet 0     acetic acid (VOSOL) 2 % otic solution INSTILL 4 DROPS TO THE LEFT EAR- DAILY OR EVERY OTHER DAY  11     simvastatin (ZOCOR) 20 MG tablet Take 1 tablet (20 mg) by mouth At Bedtime 90 tablet 3     triamcinolone (KENALOG) 0.1 % ointment Apply sparingly to affected area three times a day 80 g 2     Ferrous Sulfate (IRON SUPPLEMENT PO)        VITAMIN D, CHOLECALCIFEROL, PO Take 1,000 Units by mouth daily        Allergies   Allergen Reactions     Seasonal Allergies        Reviewed and updated as needed this visit by clinical staff  Tobacco  Allergies  Meds  Med Hx  Surg Hx  Fam Hx  Soc Hx        Reviewed and updated as needed this visit by Provider            Review of Systems   Constitutional: Negative for chills and fever.   HENT: Positive for ear pain and hearing loss. Negative for  congestion and sore throat.    Eyes: Negative for pain and visual disturbance.   Respiratory: Negative for cough and shortness of breath.    Cardiovascular: Negative for chest pain, palpitations and peripheral edema.   Gastrointestinal: Negative for abdominal pain, constipation, diarrhea, heartburn, hematochezia and nausea.   Genitourinary: Negative for discharge, dysuria, frequency, genital sores, hematuria, impotence and urgency.   Musculoskeletal: Negative for arthralgias, joint swelling and myalgias.   Skin: Negative for rash.   Neurological: Negative for dizziness, weakness, headaches and paresthesias.   Psychiatric/Behavioral: Negative for mood changes. The patient is not nervous/anxious.          OBJECTIVE:   Wt 88.9 kg (196 lb)   BMI 27.44 kg/m     /77 (BP Location: Right arm, Patient Position: Sitting, Cuff Size: Adult Regular)   Pulse 79   Temp 98.4  F (36.9  C) (Oral)   Wt 88.9 kg (196 lb)   SpO2 98%   BMI 27.44 kg/m        Physical Exam  GENERAL: alert, no distress and over weight  EYES: Eyes grossly normal to inspection, PERRL and conjunctivae and sclerae normal  HENT: Right ear canal and TM normal, left ear canal is clear and the TM has a green PE tube present, nose and mouth without ulcers or lesions  NECK: no adenopathy, no asymmetry, masses, or scars and thyroid normal to palpation  RESP: lungs clear to auscultation - no rales, rhonchi or wheezes  CV: regular rate and rhythm, normal S1 S2, no S3 or S4, no murmur, click or rub, no peripheral edema and peripheral pulses strong  ABDOMEN: soft, nontender, no hepatosplenomegaly, no masses   MS: no gross musculoskeletal defects noted, no edema  SKIN: no suspicious lesions or rashes  NEURO: Normal strength and tone, mentation intact and speech normal  PSYCH: mentation appears normal, affect normal/bright    Diagnostic Test Results:  Labs reviewed in Epic    ASSESSMENT/PLAN:       ICD-10-CM    1. Routine general medical examination at a [facility]  "care facility Z00.00    2. CKD (chronic kidney disease) stage 3, GFR 30-59 ml/min (H) N18.3 Albumin Random Urine Quantitative with Creat Ratio     Basic metabolic panel     CBC with platelets     Vitamin D Deficiency   3. Hyperlipidemia LDL goal <100 E78.5 simvastatin (ZOCOR) 20 MG tablet     Lipid panel reflex to direct LDL Fasting     ALT   4. Conductive hearing loss of left ear with unrestricted hearing of right ear H90.12    5. Donor of kidney for transplant Z52.4    6. Hx of adenomatous polyp of colon Z86.010    7. Need for prophylactic vaccination and inoculation against influenza Z23 TD (ADULT, 7+) PRESERVE FREE          ADMIN VACCINE, FIRST     INFLUENZA QUAD, RECOMBINANT, P-FREE (RIV4) (FLUBLOCK) [96348]     Vaccine Administration, Each Additional [31842]     Blood pressure and other vital signs look good   We discussed the above conditions, including his diagnosis of chronic kidney disease stage III  Discussed that we want to maintain healthy cholesterol values, blood pressure, and glucose values to help preserve lone kidney function  We will check fasting labs today as above  Continue same medications  Continue routine ENT follow-up for the left ear  We will give him a tetanus booster and flu shot today  Plan a repeat colonoscopy in 4 years for now  Plan a follow-up visit back here in 1 year for another physical and fasting labs    COUNSELING:   Reviewed preventive health counseling, as reflected in patient instructions       Regular exercise       Healthy diet/nutrition       Immunizations    Vaccinated for: Influenza and Td          Estimated body mass index is 27.44 kg/m  as calculated from the following:    Height as of 6/5/19: 1.8 m (5' 10.87\").    Weight as of this encounter: 88.9 kg (196 lb).     Weight management plan: Discussed healthy diet and exercise guidelines     reports that he has never smoked. He has never used smokeless tobacco.      Counseling Resources:  ATP IV Guidelines  Pooled " Cohorts Equation Calculator  FRAX Risk Assessment  ICSI Preventive Guidelines  Dietary Guidelines for Americans, 2010  USDA's MyPlate  ASA Prophylaxis  Lung CA Screening    To Whitt MD  [facility]

## 2019-10-08 NOTE — NURSING NOTE
Prior to immunization administration, verified patients identity using patient s name and date of birth. Please see Immunization Activity for additional information.     Screening Questionnaire for Adult Immunization    Are you sick today?   No   Do you have allergies to medications, food, a vaccine component or latex?   No   Have you ever had a serious reaction after receiving a vaccination?   No   Do you have a long-term health problem with heart disease, lung disease, asthma, kidney disease, metabolic disease (e.g. diabetes), anemia, or other blood disorder?   No   Do you have cancer, leukemia, HIV/AIDS, or any other immune system problem?   No   In the past 3 months, have you taken medications that affect  your immune system, such as prednisone, other steroids, or anticancer drugs; drugs for the treatment of rheumatoid arthritis, Crohn s disease, or psoriasis; or have you had radiation treatments?   No   Have you had a seizure, or a brain or other nervous system problem?   No   During the past year, have you received a transfusion of blood or blood     products, or been given immune (gamma) globulin or antiviral drug?   No   For women: Are you pregnant or is there a chance you could become        pregnant during the next month?   No   Have you received any vaccinations in the past 4 weeks?   No     Immunization questionnaire answers were all negative.        Per orders of Dr. Whitt, injection of Td, Influenza given by Gaby Segura CMA. Patient instructed to remain in clinic for 15 minutes afterwards, and to report any adverse reaction to me immediately.  Vaccine information supplied.       Screening performed by Gaby Segura CMA on 10/8/2019 at 7:20 AM.

## 2019-10-09 LAB
CREAT UR-MCNC: 91 MG/DL
MICROALBUMIN UR-MCNC: 5 MG/L
MICROALBUMIN/CREAT UR: 5.65 MG/G CR (ref 0–17)

## 2019-10-09 NOTE — RESULT ENCOUNTER NOTE
Suhail,  Your lab work continues to look good and/or stable from the past.  Your vitamin D level, electrolytes, liver test, cholesterol values, and blood counts are all nice and normal.  Creatinine kidney test is stable from last time.  I have recorded these values on your health screening form and we will get that faxed in for you later today.  Please continue your same medications.  I would advise another recheck in 1 year.    To Whitt MD

## 2019-11-15 NOTE — MR AVS SNAPSHOT
After Visit Summary   7/24/2018    Suhail Mustafa    MRN: [id number]           Patient Information     Date Of Birth          1970        Visit Information        Provider Department      7/24/2018 3:45 PM To Callaway MD [facility]        Today's Diagnoses     Left shoulder pain, unspecified chronicity    -  1      Care Instructions    - Please call [facility] at [phone] to schedule your MRI.     [facility] is located at:   [address] [place] [address]    -  Once your MRI is scheduled, make an appointment to discuss the results with Dr. To Callaway.  You can call [phone] to make this appointment, anytime 2-3 days after your MRI scan is performed. Dr. Callaway prefers patients to come in for a follow-up appointment to discuss next steps after the MRI.            Follow-ups after your visit        Your next 10 appointments already scheduled     Nov 07, 2018  7:00 AM CST   (Arrive by 6:45 AM)   Return Visit with Yaneth Rogel MD   [facility] ([facility])    [address] [place] [address] [phone]              Future tests that were ordered for you today     Open Future Orders        Priority Expected Expires Ordered    XR Shoulder Arthrogram Left Routine 7/24/2018 7/24/2019 7/24/2018    MR Shoulder Left w Contrast Routine  7/24/2019 7/24/2018            Who to contact     If you have questions or need follow up information about today's clinic visit or your schedule please contact [facility] directly at [phone].  Normal or non-critical lab and imaging results will be communicated to you by MyChart, letter or phone within 4 business days after the clinic has received the results. If you do not hear from us within 7 days, please contact the clinic through MyChart or phone. If  ----- Message from Radha Ng MD sent at 11/15/2019  9:44 AM CST -----  She does have a lower bone density called osteopenia but not to the severity of osteoporosis.  Recommend maximizing calcium and vitamin intake.  1200 mg calcium daily and at least 7865-0836 IU of vitamin D.   "you have a critical or abnormal lab result, we will notify you by phone as soon as possible.  Submit refill requests through [employer] or call your pharmacy and they will forward the refill request to us. Please allow 3 business days for your refill to be completed.          Additional Information About Your Visit        [employer] Information     [employer] gives you secure access to your electronic health record. If you see a primary care provider, you can also send messages to your care team and make appointments. If you have questions, please call your primary care clinic.  If you do not have a primary care provider, please call [phone] and they will assist you.        Care EveryWhere ID     This is your Care EveryWhere ID. This could be used by other organizations to access your [facility] medical records  [id number]        Your Vitals Were     Pulse Respirations Height BMI (Body Mass Index)          80 13 1.797 m (5' 10.75\") 29.5 kg/m2         Blood Pressure from Last 3 Encounters:   07/24/18 131/82   04/24/18 109/71   06/07/17 107/70    Weight from Last 3 Encounters:   07/24/18 95.3 kg (210 lb)   05/03/18 96.2 kg (212 lb)   04/24/18 94.8 kg (209 lb)               Primary Care Provider Office Phone # Fax #    To Whitt [phone]       [address] [facility] [address]        Equal Access to Services     [facility]: Hadii aad ku hadasho Soomaali, waaxda luqadaha, qaybta kaalmada adeegyada, dixie hurtado . So [facility] [phone].    ATENCIÓN: Si habla español, tiene a martin disposición servicios gratuitos de asistencia lingüística. Llame al [phone].    We comply with applicable federal civil rights laws and [place] laws. We do not discriminate on the basis of race, color, national origin, age, disability, sex, sexual orientation, or gender identity.            Thank you!     Thank you for choosing [facility]  for your care. " Our goal is always to provide you with excellent care. Hearing back from our patients is one way we can continue to improve our services. Please take a few minutes to complete the written survey that you may receive in the mail after your visit with us. Thank you!             Your Updated Medication List - Protect others around you: Learn how to safely use, store and throw away your medicines at www.disposemymeds.org.          This list is accurate as of 7/24/18  4:37 PM.  Always use your most recent med list.                   Brand Name Dispense Instructions for use Diagnosis    acetaminophen 325 MG tablet    TYLENOL    100 tablet    Take 2 tablets (650 mg) by mouth every 6 hours as needed for mild pain    Donor of kidney for transplant       acetic acid 2 % otic solution    VOSOL     INSTILL 4 DROPS TO THE LEFT EAR- DAILY OR EVERY OTHER DAY        simvastatin 20 MG tablet    ZOCOR    90 tablet    Take 1 tablet (20 mg) by mouth At Bedtime    Hyperlipidemia LDL goal <100       triamcinolone 0.1 % ointment    KENALOG    80 g    Apply sparingly to affected area three times a day    Other eczema       VITAMIN D (CHOLECALCIFEROL) PO      Take 1,000 Units by mouth daily

## 2020-01-06 NOTE — NURSING NOTE
Chief Complaint   Patient presents with     RECHECK     Return 6 month F/U Pt states left ear fullness, pt had flu shot 2 wks and since feeling some ear fullness.        TAMMY Prince LPN     labs

## 2020-01-15 ENCOUNTER — OFFICE VISIT (OUTPATIENT)
Dept: OTOLARYNGOLOGY | Facility: CLINIC | Age: 50
End: 2020-01-15
Payer: COMMERCIAL

## 2020-01-15 VITALS
SYSTOLIC BLOOD PRESSURE: 131 MMHG | BODY MASS INDEX: 28.95 KG/M2 | WEIGHT: 206.8 LBS | HEART RATE: 79 BPM | DIASTOLIC BLOOD PRESSURE: 90 MMHG

## 2020-01-15 DIAGNOSIS — H92.12 OTORRHEA, LEFT: ICD-10-CM

## 2020-01-15 DIAGNOSIS — H69.92 DYSFUNCTION OF LEFT EUSTACHIAN TUBE: Primary | ICD-10-CM

## 2020-01-15 ASSESSMENT — PAIN SCALES - GENERAL: PAINLEVEL: MILD PAIN (3)

## 2020-01-15 NOTE — PATIENT INSTRUCTIONS
1. You were seen in the ENT Clinic today by .  If you have any questions or concerns after your appointment, please call   - Option 1: ENT Clinic: [phone]  - Option 2: Leia ('s Nurse): [phone]    2.   Plan to return to clinic in one month without a new hearing test.     CAROLYNN Dupree  [facility] Otolaryngology  [phone]

## 2020-01-15 NOTE — LETTER
1/15/2020      RE: Suhail Mustafa  [address] [place] [address]       Suhail Mustafa is seen for a left ear check.  He reports his ear has been uncomfortable and full.  He thinks his t-tube is either out or plugged.  He started having a lot of increased fullness late last week and he started drops but he can't taste them like he usually can.  He did also have quite a bit of otorrhea around Sis for which he used drops and it cleared.  He's still using his hearing aid to good effect and can't tell if there's been a change in hearing.    Physical examination:  male in no acute distress.  Alert and answering questions appropriately.  HB 1/6 bilaterally.  Left ear examined under the microscope.  There is quite a bit of moist debris around the tube but the lumen itself is open and the middle ear is visible and appears clear, once the debris was suctioned, the TM around the tube is edematous, there is otorrhea around the epitympanum and after this was suctioned, there is noted to be a little granulation issue in that area as well.    Assessment and plan:  Left otorrhea with granulation tissue and TM edema.  We will call with the culture results and get him on appropriate drops as well as possibly oral agents given that the TM is inflamed.  We'll see him again in one month.      Yaneth Rogel MD

## 2020-01-15 NOTE — PROGRESS NOTES
Suhail Mustafa is seen for a left ear check.  He reports his ear has been uncomfortable and full.  He thinks his t-tube is either out or plugged.  He started having a lot of increased fullness late last week and he started drops but he can't taste them like he usually can.  He did also have quite a bit of otorrhea around Sis for which he used drops and it cleared.  He's still using his hearing aid to good effect and can't tell if there's been a change in hearing.    Physical examination:  male in no acute distress.  Alert and answering questions appropriately.  HB 1/6 bilaterally.  Left ear examined under the microscope.  There is quite a bit of moist debris around the tube but the lumen itself is open and the middle ear is visible and appears clear, once the debris was suctioned, the TM around the tube is edematous, there is otorrhea around the epitympanum and after this was suctioned, there is noted to be a little granulation issue in that area as well.    Assessment and plan:  Left otorrhea with granulation tissue and TM edema.  We will call with the culture results and get him on appropriate drops as well as possibly oral agents given that the TM is inflamed.  We'll see him again in one month.

## 2020-01-15 NOTE — NURSING NOTE
Chief Complaint   Patient presents with     RECHECK     follow up visit     Merlene Serrano LPN

## 2020-01-18 LAB
BACTERIA SPEC CULT: ABNORMAL
SPECIMEN SOURCE: ABNORMAL

## 2020-01-19 RX ORDER — SULFACETAMIDE SODIUM AND PREDNISOLONE SODIUM PHOSPHATE 100; 2.3 MG/ML; MG/ML
SOLUTION/ DROPS OPHTHALMIC
Qty: 5 ML | Refills: 3 | Status: SHIPPED | OUTPATIENT
Start: 2020-01-19 | End: 2020-02-19

## 2020-01-19 RX ORDER — SULFAMETHOXAZOLE/TRIMETHOPRIM 800-160 MG
1 TABLET ORAL 2 TIMES DAILY
Qty: 42 TABLET | Refills: 0 | Status: SHIPPED | OUTPATIENT
Start: 2020-01-19 | End: 2020-02-19

## 2020-01-20 ENCOUNTER — TELEPHONE (OUTPATIENT)
Dept: OTOLARYNGOLOGY | Facility: CLINIC | Age: 50
End: 2020-01-20

## 2020-01-20 NOTE — TELEPHONE ENCOUNTER
Called and left message for patient stating he is growing staph bacteria in his ear. There are two prescriptions at his pharmacy. Bactrim, an oral antibiotic, and Vasocidin, an ear drop. Directions were provided for both. Call back information given.

## 2020-02-12 LAB
FUNGUS SPEC CULT: NORMAL
Lab: NORMAL
SPECIMEN SOURCE: NORMAL

## 2020-02-19 ENCOUNTER — OFFICE VISIT (OUTPATIENT)
Dept: OTOLARYNGOLOGY | Facility: CLINIC | Age: 50
End: 2020-02-19
Payer: COMMERCIAL

## 2020-02-19 VITALS
BODY MASS INDEX: 29.12 KG/M2 | HEART RATE: 68 BPM | WEIGHT: 208 LBS | SYSTOLIC BLOOD PRESSURE: 124 MMHG | RESPIRATION RATE: 13 BRPM | TEMPERATURE: 97.8 F | HEIGHT: 71 IN | DIASTOLIC BLOOD PRESSURE: 85 MMHG

## 2020-02-19 DIAGNOSIS — H69.92 DYSFUNCTION OF LEFT EUSTACHIAN TUBE: Primary | ICD-10-CM

## 2020-02-19 ASSESSMENT — MIFFLIN-ST. JEOR: SCORE: 1820.61

## 2020-02-19 ASSESSMENT — PAIN SCALES - GENERAL: PAINLEVEL: MILD PAIN (3)

## 2020-02-19 NOTE — LETTER
"2020      RE: Suhail Mustafa  [address]   [facility] [address]         Otolaryngology Clinic      Name: Suhail Mustafa  MRN: [id number]  Age: 49 year old  : 1970  Referring provider: To Whitt  2020      Chief Complaint:   RECHECK    History of Present Illness:   Suhail Mustafa is a 49 year old male with a history of left eustachian tube dysfunction s/p left myringotomy with diode laser and T-tube placement who presents for follow up.    He was last seen on 1/15 to check his left ear with T-tube. At that time, he was noted to have left otorrhea with granulation tissue and TM edema, so after culture results he was started on oral Bactrim and Vasocidin drops on .     Today he reports his otalgia has improved a lot. He has not used any drops in 5 days. The drops helped with the pain but did not seem to improve the sensation of ear wetness.     Physical Exam:   /85   Pulse 68   Temp 97.8  F (36.6  C)   Resp 13   Ht 1.803 m (5' 11\")   Wt 94.3 kg (208 lb)   BMI 29.01 kg/m      Male in no acute distress. Alert and answering questions appropriately. HB 1/6 bilaterally.  Left ear examined under the microscope. Left TM is slightly moist, but edema has improved. The t-tube is in good position and open with a clear middle ear viewed through the lumen, some moisture is on the TM but there is no otorrhea noted.    Assessment and Plan:  Suhail Mustafa is a 49 year old male with a history of left eustachian tube dysfunction s/p left myringotomy with diode laser and T-tube placement who presents for follow up. He reports his left ear still feels wet, but the pain has improved significantly. His left TM is slightly moist but much improved from last time. Discussed that given his hearing aid and tube, he is likely to get occasional infections. With prophylactic oral antibiotics I would have concern for him developing resistant strains. He feels okay to continue Vosol drops, so " will have him do them periodically, once every 2-3 days, and follow-up in 6 months with audiogram.    Scribe Disclosure:  I, Melvina Cole, am serving as a scribe to document services personally performed by Yaneth Rogel MD at this visit, based upon the provider's statements to me. All documentation has been reviewed by the aforementioned provider prior to being entered into the official medical record.    The documentation recorded by the scribe accurately reflects the services I personally performed and the decisions made by me.      Yaneth Rogel MD

## 2020-02-19 NOTE — PATIENT INSTRUCTIONS
1. You were seen in the ENT Clinic today by .  If you have any questions or concerns after your appointment, please call   - Option 1: ENT Clinic: [phone]  - Option 2: [facility] ('s Nurse): [phone]    2.   Plan to return to clinic in 6 months without a new hearing test.     CAROLYNN Dupree  [facility] Otolaryngology  [phone]

## 2020-02-19 NOTE — PROGRESS NOTES
"  Otolaryngology Clinic      Name: Suhail Mustafa  MRN: [id number]  Age: 49 year old  : 1970  Referring provider: To Whitt  2020      Chief Complaint:   RECHECK    History of Present Illness:   Suhail Mustafa is a 49 year old male with a history of left eustachian tube dysfunction s/p left myringotomy with diode laser and T-tube placement who presents for follow up.    He was last seen on 1/15 to check his left ear with T-tube. At that time, he was noted to have left otorrhea with granulation tissue and TM edema, so after culture results he was started on oral Bactrim and Vasocidin drops on .     Today he reports his otalgia has improved a lot. He has not used any drops in 5 days. The drops helped with the pain but did not seem to improve the sensation of ear wetness.     Physical Exam:   /85   Pulse 68   Temp 97.8  F (36.6  C)   Resp 13   Ht 1.803 m (5' 11\")   Wt 94.3 kg (208 lb)   BMI 29.01 kg/m     Male in no acute distress. Alert and answering questions appropriately. HB 1/6 bilaterally.  Left ear examined under the microscope. Left TM is slightly moist, but edema has improved. The t-tube is in good position and open with a clear middle ear viewed through the lumen, some moisture is on the TM but there is no otorrhea noted.    Assessment and Plan:  Suhail Mustafa is a 49 year old male with a history of left eustachian tube dysfunction s/p left myringotomy with diode laser and T-tube placement who presents for follow up. He reports his left ear still feels wet, but the pain has improved significantly. His left TM is slightly moist but much improved from last time. Discussed that given his hearing aid and tube, he is likely to get occasional infections. With prophylactic oral antibiotics I would have concern for him developing resistant strains. He feels okay to continue Vosol drops, so will have him do them periodically, once every 2-3 days, and follow-up in 6 months with " audiogram.    Scribe Disclosure:  I, Melvina Douglas, am serving as a scribe to document services personally performed by Yaneth Rogel MD at this visit, based upon the provider's statements to me. All documentation has been reviewed by the aforementioned provider prior to being entered into the official medical record.    The documentation recorded by the scribe accurately reflects the services I personally performed and the decisions made by me.

## 2020-07-27 ENCOUNTER — TELEPHONE (OUTPATIENT)
Dept: OTOLARYNGOLOGY | Facility: CLINIC | Age: 50
End: 2020-07-27

## 2020-08-06 ENCOUNTER — OFFICE VISIT (OUTPATIENT)
Dept: OTOLARYNGOLOGY | Facility: CLINIC | Age: 50
End: 2020-08-06
Payer: COMMERCIAL

## 2020-08-06 VITALS
HEIGHT: 71 IN | TEMPERATURE: 98 F | DIASTOLIC BLOOD PRESSURE: 93 MMHG | HEART RATE: 68 BPM | SYSTOLIC BLOOD PRESSURE: 129 MMHG | WEIGHT: 215 LBS | BODY MASS INDEX: 30.1 KG/M2 | OXYGEN SATURATION: 100 % | RESPIRATION RATE: 18 BRPM

## 2020-08-06 DIAGNOSIS — H69.92 DYSFUNCTION OF LEFT EUSTACHIAN TUBE: Primary | ICD-10-CM

## 2020-08-06 ASSESSMENT — MIFFLIN-ST. JEOR: SCORE: 1852.36

## 2020-08-06 ASSESSMENT — PAIN SCALES - GENERAL: PAINLEVEL: MILD PAIN (3)

## 2020-08-06 NOTE — NURSING NOTE
"Chief Complaint   Patient presents with     RECHECK     follow up      Blood pressure (!) 129/93, pulse 68, temperature 98  F (36.7  C), resp. rate 18, height 1.803 m (5' 11\"), weight 97.5 kg (215 lb), SpO2 100 %.    Magdi Timmons LPN    "

## 2020-08-06 NOTE — PATIENT INSTRUCTIONS
1. You were seen in the ENT Clinic today by .  If you have any questions or concerns after your appointment, please call   - Option 1: ENT Clinic: [phone]  - Option 2: Leia ('s Nurse): [phone]    2.   Plan to return to clinic in 6 months without a new hearing test    CAROLYNN Dupree  Care Coordinator  [facility] Otolaryngology  [phone]

## 2020-08-06 NOTE — Clinical Note
"2020       RE: Suhail Mustafa  [address] [place] [address]     Dear Colleague,    Thank you for referring your patient, Suhail Mustafa, to the [facility] at [facility]. Please see a copy of my visit note below.      Otolaryngology Clinic      Name: Suhail Mustafa  MRN: [id number]  Age: 49 year old  : 1970  Referring provider: To Whitt  2020      Chief Complaint:   RECHECK    History of Present Illness:   Suhail Mustafa is a 49 year old male with a history of left eustachian tube dysfunction s/p left myringotomy with diode laser and T-tube placement who presents for follow up. He was last seen on 2020 at which time his T-tube was noted to be in place and patent with some moisture. Today he reports left aural fullness and otalgia. He reports that his hearing is down. He denies otorrhea.    Physical Exam:   BP (!) 129/93   Pulse 68   Temp 98  F (36.7  C)   Resp 18   Ht 1.803 m (5' 11\")   Wt 97.5 kg (215 lb)   SpO2 100%   BMI 29.99 kg/m      Male in no acute distress. Alert and answering questions appropriately. HB 1/6 bilaterally.  Left ear examined under the microscope. Left T-tube is in place, shaft surrounded by cholesteatoma and ceruminous debris, T-tube lumen appears to be partially obstructed by middle ear fibrous tissue. The cholesteatoma and ceruminous debris was debrided with a combination of suction and a straight pick, during manipulation of the T-tube the partial obstruction of the lumen was removed with return of serous effusion.    Assessment and Plan:  Suhail Mustafa is a 49 year old male with a history of left eustachian tube dysfunction s/p left myringotomy with diode laser and T-tube placement who presents for follow up. His left T-tube appeared partially obstructed, however this was removed with manipulation of the T-tube. We recommend Ciprodex otic drops 5 drops to the left ear twice " daily for 1 week. We will see him in 6 months or sooner with problems.                Again, thank you for allowing me to participate in the care of your patient.      Sincerely,    Yaneth Rogel MD

## 2020-08-06 NOTE — LETTER
"2020      RE: Suhail Mustafa  [address]   [facility] [address]         Otolaryngology Clinic      Name: Suhail Mustafa  MRN: [id number]  Age: 49 year old  : 1970  Referring provider: To Whitt  2020      Chief Complaint:   RECHECK    History of Present Illness:   Suhail Mustafa is a 49 year old male with a history of left eustachian tube dysfunction s/p left myringotomy with diode laser and T-tube placement who presents for follow up. He was last seen on 2020 at which time his T-tube was noted to be in place and patent with some moisture. Today he reports left aural fullness and otalgia. He reports that his hearing is down. He denies otorrhea.    Physical Exam:   BP (!) 129/93   Pulse 68   Temp 98  F (36.7  C)   Resp 18   Ht 1.803 m (5' 11\")   Wt 97.5 kg (215 lb)   SpO2 100%   BMI 29.99 kg/m      Male in no acute distress. Alert and answering questions appropriately. HB 1/6 bilaterally.  Left ear examined under the microscope. Left T-tube is in place, base surrounded by cholesteatoma and ceruminous debris, T-tube lumen appears to be partially obstructed by middle ear fibrous tissue. The cholesteatoma and ceruminous debris was debrided with a combination of suction and a straight pick, during manipulation of the T-tube the partial obstruction of the lumen was removed with return of serous effusion.    Assessment and Plan:  Suhail Mustafa is a 49 year old male with a history of left eustachian tube dysfunction s/p left myringotomy with diode laser and T-tube placement who presents for follow up. His left T-tube appeared partially obstructed, however this was removed with manipulation of the T-tube. We recommend Ciprodex otic drops 5 drops to the left ear twice daily for 1 week. We will see him in 6 months or sooner with problems.    Yaneth BOYD MD, saw this patient with the resident/fellow and agree with the resident/fellow s findings and plan of care as " documented in the resident s/fellow s note. I was present for the entire procedure.    MD Yaneth Stanley MD

## 2020-08-06 NOTE — PROGRESS NOTES
"  Otolaryngology Clinic      Name: Suhail Mustafa  MRN: [id number]  Age: 49 year old  : 1970  Referring provider: To Whitt  2020      Chief Complaint:   RECHECK    History of Present Illness:   Suhail Mustafa is a 49 year old male with a history of left eustachian tube dysfunction s/p left myringotomy with diode laser and T-tube placement who presents for follow up. He was last seen on 2020 at which time his T-tube was noted to be in place and patent with some moisture. Today he reports left aural fullness and otalgia. He reports that his hearing is down. He denies otorrhea.    Physical Exam:   BP (!) 129/93   Pulse 68   Temp 98  F (36.7  C)   Resp 18   Ht 1.803 m (5' 11\")   Wt 97.5 kg (215 lb)   SpO2 100%   BMI 29.99 kg/m      Male in no acute distress. Alert and answering questions appropriately. HB 1/6 bilaterally.  Left ear examined under the microscope. Left T-tube is in place, base surrounded by cholesteatoma and ceruminous debris, T-tube lumen appears to be partially obstructed by middle ear fibrous tissue. The cholesteatoma and ceruminous debris was debrided with a combination of suction and a straight pick, during manipulation of the T-tube the partial obstruction of the lumen was removed with return of serous effusion.    Assessment and Plan:  Suhail Mustafa is a 49 year old male with a history of left eustachian tube dysfunction s/p left myringotomy with diode laser and T-tube placement who presents for follow up. His left T-tube appeared partially obstructed, however this was removed with manipulation of the T-tube. We recommend Ciprodex otic drops 5 drops to the left ear twice daily for 1 week. We will see him in 6 months or sooner with problems.    I, Yaneth Rogel MD, saw this patient with the resident/fellow and agree with the resident/fellow s findings and plan of care as documented in the resident s/fellow s note. I was present for the entire procedure.    Yaneth" GARRICK Rogel MD

## 2020-09-09 ENCOUNTER — TELEPHONE (OUTPATIENT)
Dept: AUDIOLOGY | Facility: CLINIC | Age: 50
End: 2020-09-09

## 2020-09-09 NOTE — TELEPHONE ENCOUNTER
Suhail Mustafa was seen at the [facility] on 9/9/20 for hearing aid services.  His left hearing aid was cleaned and left earmold tubing was  Replaced today.  The hearing aid was found to be working normally.  Mr. Mustafa reported a comfortable fit and good sound quality.  He will return to the clinic as needed.

## 2020-11-14 ENCOUNTER — HEALTH MAINTENANCE LETTER (OUTPATIENT)
Age: 50
End: 2020-11-14

## 2021-01-05 ENCOUNTER — OFFICE VISIT (OUTPATIENT)
Dept: FAMILY MEDICINE | Facility: CLINIC | Age: 51
End: 2021-01-05
Payer: COMMERCIAL

## 2021-01-05 VITALS
HEART RATE: 72 BPM | HEIGHT: 71 IN | WEIGHT: 215 LBS | TEMPERATURE: 97.9 F | OXYGEN SATURATION: 98 % | BODY MASS INDEX: 30.1 KG/M2 | DIASTOLIC BLOOD PRESSURE: 82 MMHG | SYSTOLIC BLOOD PRESSURE: 122 MMHG

## 2021-01-05 DIAGNOSIS — Z00.00 ROUTINE GENERAL MEDICAL EXAMINATION AT A HEALTH CARE FACILITY: Primary | ICD-10-CM

## 2021-01-05 DIAGNOSIS — B35.1 ONYCHOMYCOSIS: ICD-10-CM

## 2021-01-05 DIAGNOSIS — N18.31 STAGE 3A CHRONIC KIDNEY DISEASE (H): ICD-10-CM

## 2021-01-05 DIAGNOSIS — E78.5 HYPERLIPIDEMIA LDL GOAL <100: ICD-10-CM

## 2021-01-05 DIAGNOSIS — H90.12 CONDUCTIVE HEARING LOSS OF LEFT EAR WITH UNRESTRICTED HEARING OF RIGHT EAR: ICD-10-CM

## 2021-01-05 DIAGNOSIS — L30.8 OTHER ECZEMA: ICD-10-CM

## 2021-01-05 PROBLEM — M25.512 ACUTE PAIN OF LEFT SHOULDER: Status: RESOLVED | Noted: 2017-06-17 | Resolved: 2021-01-05

## 2021-01-05 LAB
ALT SERPL W P-5'-P-CCNC: 54 U/L (ref 0–70)
ANION GAP SERPL CALCULATED.3IONS-SCNC: 3 MMOL/L (ref 3–14)
BUN SERPL-MCNC: 14 MG/DL (ref 7–30)
CALCIUM SERPL-MCNC: 8.9 MG/DL (ref 8.5–10.1)
CHLORIDE SERPL-SCNC: 106 MMOL/L (ref 94–109)
CHOLEST SERPL-MCNC: 153 MG/DL
CO2 SERPL-SCNC: 30 MMOL/L (ref 20–32)
CREAT SERPL-MCNC: 1.51 MG/DL (ref 0.66–1.25)
CREAT UR-MCNC: 122 MG/DL
ERYTHROCYTE [DISTWIDTH] IN BLOOD BY AUTOMATED COUNT: 11.7 % (ref 10–15)
GFR SERPL CREATININE-BSD FRML MDRD: 52 ML/MIN/{1.73_M2}
GLUCOSE SERPL-MCNC: 96 MG/DL (ref 70–99)
HCT VFR BLD AUTO: 44 % (ref 40–53)
HDLC SERPL-MCNC: 44 MG/DL
HGB BLD-MCNC: 15.2 G/DL (ref 13.3–17.7)
LDLC SERPL CALC-MCNC: 89 MG/DL
MCH RBC QN AUTO: 32.8 PG (ref 26.5–33)
MCHC RBC AUTO-ENTMCNC: 34.5 G/DL (ref 31.5–36.5)
MCV RBC AUTO: 95 FL (ref 78–100)
MICROALBUMIN UR-MCNC: 9 MG/L
MICROALBUMIN/CREAT UR: 7.05 MG/G CR (ref 0–17)
NONHDLC SERPL-MCNC: 109 MG/DL
PLATELET # BLD AUTO: 231 10E9/L (ref 150–450)
POTASSIUM SERPL-SCNC: 3.8 MMOL/L (ref 3.4–5.3)
PSA SERPL-ACNC: 1.24 UG/L (ref 0–4)
RBC # BLD AUTO: 4.63 10E12/L (ref 4.4–5.9)
SODIUM SERPL-SCNC: 139 MMOL/L (ref 133–144)
TRIGL SERPL-MCNC: 99 MG/DL
WBC # BLD AUTO: 5.5 10E9/L (ref 4–11)

## 2021-01-05 PROCEDURE — 80048 BASIC METABOLIC PNL TOTAL CA: CPT | Performed by: FAMILY MEDICINE

## 2021-01-05 PROCEDURE — 99396 PREV VISIT EST AGE 40-64: CPT | Performed by: FAMILY MEDICINE

## 2021-01-05 PROCEDURE — 36415 COLL VENOUS BLD VENIPUNCTURE: CPT | Performed by: FAMILY MEDICINE

## 2021-01-05 PROCEDURE — G0103 PSA SCREENING: HCPCS | Performed by: FAMILY MEDICINE

## 2021-01-05 PROCEDURE — 82043 UR ALBUMIN QUANTITATIVE: CPT | Performed by: FAMILY MEDICINE

## 2021-01-05 PROCEDURE — 84460 ALANINE AMINO (ALT) (SGPT): CPT | Performed by: FAMILY MEDICINE

## 2021-01-05 PROCEDURE — 85027 COMPLETE CBC AUTOMATED: CPT | Performed by: FAMILY MEDICINE

## 2021-01-05 PROCEDURE — 99213 OFFICE O/P EST LOW 20 MIN: CPT | Mod: 25 | Performed by: FAMILY MEDICINE

## 2021-01-05 PROCEDURE — 80061 LIPID PANEL: CPT | Performed by: FAMILY MEDICINE

## 2021-01-05 RX ORDER — TRIAMCINOLONE ACETONIDE 1 MG/G
OINTMENT TOPICAL
Qty: 80 G | Refills: 2 | Status: SHIPPED | OUTPATIENT
Start: 2021-01-05 | End: 2023-07-18

## 2021-01-05 RX ORDER — SIMVASTATIN 20 MG
20 TABLET ORAL AT BEDTIME
Qty: 90 TABLET | Refills: 3 | Status: SHIPPED | OUTPATIENT
Start: 2021-01-05 | End: 2022-02-01

## 2021-01-05 RX ORDER — FERROUS GLUCONATE 324(38)MG
324 TABLET ORAL
COMMUNITY
End: 2022-05-18

## 2021-01-05 ASSESSMENT — ENCOUNTER SYMPTOMS
ARTHRALGIAS: 0
SORE THROAT: 0
ABDOMINAL PAIN: 0
CHILLS: 0
HEMATOCHEZIA: 0
DIZZINESS: 0
PALPITATIONS: 0
CONSTIPATION: 0
DYSURIA: 0
PARESTHESIAS: 0
MYALGIAS: 0
FREQUENCY: 0
HEADACHES: 0
FEVER: 0
NAUSEA: 0
JOINT SWELLING: 0
EYE PAIN: 0
DIARRHEA: 0
HEARTBURN: 0
SHORTNESS OF BREATH: 0
HEMATURIA: 0
WEAKNESS: 0
NERVOUS/ANXIOUS: 0
COUGH: 0

## 2021-01-05 ASSESSMENT — MIFFLIN-ST. JEOR: SCORE: 1843.97

## 2021-01-05 NOTE — PROGRESS NOTES
SUBJECTIVE:   CC: Suhail Mustafa is an 50 year old male who presents for a preventative health visit and follow-up on some baseline health conditions.    Patient has been advised of split billing requirements and indicates understanding: Yes  Healthy Habits:     Getting at least 3 servings of Calcium per day:  Yes    Bi-annual eye exam:  Yes    Dental care twice a year:  Yes    Sleep apnea or symptoms of sleep apnea:  None    Diet:  Regular (no restrictions) and Gluten-free/reduced    Frequency of exercise:  2-3 days/week    Duration of exercise:  30-45 minutes    Taking medications regularly:  Yes    Medication side effects:  None    PHQ-2 Total Score: 0    Additional concerns today:  No          Today's PHQ-2 Score:   PHQ-2 ( 1999 [employer]) 1/5/2021   Q1: Little interest or pleasure in doing things 0   Q2: Feeling down, depressed or hopeless 0   PHQ-2 Score 0   Q1: Little interest or pleasure in doing things Not at all   Q2: Feeling down, depressed or hopeless Not at all   PHQ-2 Score 0       Abuse: Current or Past(Physical, Sexual or Emotional)- No  Do you feel safe in your environment? Yes    He remains on simvastatin for hyperlipidemia.  He uses triamcinolone ointment periodically for eczema.  He sees ENT every 6 months for chronic left ear issues.  He has a PE tube in the left ear and uses eardrops a few times a week.    Social History     Tobacco Use     Smoking status: Never Smoker     Smokeless tobacco: Never Used   Substance Use Topics     Alcohol use: Yes     Alcohol/week: 1.0 standard drinks     Comment: once per week with a meal         Alcohol Use 1/5/2021   Prescreen: >3 drinks/day or >7 drinks/week? No   Prescreen: >3 drinks/day or >7 drinks/week? -       Last PSA:   PSA   Date Value Ref Range Status   04/24/2018 1.76 0 - 4 ug/L Final     Comment:     Assay Method:  Chemiluminescence using Siemens Vista analyzer       Reviewed orders with patient. Reviewed health maintenance and updated orders  accordingly - Yes  Patient Active Problem List   Diagnosis     FH: kidney cancer     Conductive hearing loss in left ear     Donor of kidney for transplant     Lichen simplex chronicus     ACP (advance care planning)     Hyperlipidemia LDL goal <100     Overweight (BMI 25.0-29.9)     CKD (chronic kidney disease) stage 3, GFR 30-59 ml/min     Labral tear of shoulder, left, subsequent encounter     Shoulder instability, left     Hx of adenomatous polyp of colon     Past Surgical History:   Procedure Laterality Date     ARTHROSCOPY SHOULDER, OPEN BICEP TENODESIS REPAIR, COMBINED Left 11/16/2018    Procedure: Left Shoulder Diagnostic Arthroscopy, Labral Debridement, Open Biceps Tenodesis;  Surgeon: Anai Mata MD;  Location:  OR     ENT SURGERY  multiple times    pe tubes     ENT SURGERY      tonsils     ENT SURGERY  11/2011    Tubes 5 times since last time 10/21/14     GENITOURINARY SURGERY  Oct. 2013    Vasectomy     LAPAROSCOPIC DONOR HAND ASSISTED KIDNEY LIVING RELATED N/A 10/13/2016    Procedure: LAPAROSCOPIC DONOR HAND ASSISTED KIDNEY LIVING RELATED;  Surgeon: Tigre Quarles MD;  Location:  OR     LASER DIODE TYMPANOMASTOIDECTOMY Left 3/31/2015    Procedure: LASER DIODE TYMPANOMASTOIDECTOMY;  Surgeon: Yaneth Rogel MD;  Location:  OR     LASER DIODE TYMPANOTOMY Left 6/12/2019    Procedure: Laser Myringotomy with Diode Laser and T-tube Placement;  Surgeon: Yaneth Rogel MD;  Location:  OR     ORTHOPEDIC SURGERY  child    dislocated hip     OSSICULAR CHAIN RECONSTRUCTION N/A 10/9/2015    Procedure: OSSICULAR CHAIN RECONSTRUCTION;  Surgeon: Yaneth Rogel MD;  Location:  OR     TYMPANOPLASTY Left 10/9/2015    Procedure: TYMPANOPLASTY;  Surgeon: Yaneth Rogel MD;  Location:  OR       Social History     Tobacco Use     Smoking status: Never Smoker     Smokeless tobacco: Never Used   Substance Use Topics     Alcohol use: Yes     Alcohol/week: 1.0 standard drinks     Comment: once per week  with a meal     Family History   Problem Relation Age of Onset     Gastrointestinal Disease Mother         colostomy for diverticulitis     Cerebrovascular Disease Mother         after giving birth     Kidney Disease Brother         dysplasia     Genitourinary Problems Brother      Cardiovascular Brother         ablation     Hypertension Brother      Heart Disease Father      Genitourinary Problems Father      Diabetes Type 2  Father         obese     Cancer Paternal Grandfather         bone cancer     Aneurysm Paternal Uncle         brain aneurysm on coumadin     Hypertension Brother          Current Outpatient Medications   Medication Sig Dispense Refill     acetaminophen (TYLENOL) 325 MG tablet Take 2 tablets (650 mg) by mouth every 4 hours as needed for mild pain 50 tablet 0     acetic acid (VOSOL) 2 % otic solution INSTILL 4 DROPS TO THE LEFT EAR- DAILY OR EVERY OTHER DAY  11     ferrous gluconate (FERGON) 324 (38 Fe) MG tablet Take 324 mg by mouth       simvastatin (ZOCOR) 20 MG tablet Take 1 tablet (20 mg) by mouth At Bedtime 90 tablet 3     triamcinolone (KENALOG) 0.1 % external ointment Apply sparingly to affected area three times a day 80 g 2     VITAMIN D, CHOLECALCIFEROL, PO Take 1,000 Units by mouth daily        Allergies   Allergen Reactions     Seasonal Allergies        Reviewed and updated as needed this visit by clinical staff  Tobacco  Allergies  Meds   Med Hx  Surg Hx  Fam Hx  Soc Hx        Reviewed and updated as needed this visit by Provider                    Review of Systems   Constitutional: Negative for chills and fever.   HENT: Positive for ear pain. Negative for congestion, hearing loss and sore throat.    Eyes: Negative for pain and visual disturbance.   Respiratory: Negative for cough and shortness of breath.    Cardiovascular: Negative for chest pain, palpitations and peripheral edema.   Gastrointestinal: Negative for abdominal pain, constipation, diarrhea, heartburn,  "hematochezia and nausea.   Genitourinary: Negative for discharge, dysuria, frequency, genital sores, hematuria, impotence and urgency.   Musculoskeletal: Negative for arthralgias, joint swelling and myalgias.   Skin: Negative for rash.   Neurological: Negative for dizziness, weakness, headaches and paresthesias.   Psychiatric/Behavioral: Negative for mood changes. The patient is not nervous/anxious.          OBJECTIVE:   /82 (BP Location: Right arm, Patient Position: Sitting, Cuff Size: Adult Regular)   Pulse 72   Temp 97.9  F (36.6  C) (Oral)   Ht 1.798 m (5' 10.79\")   Wt 97.5 kg (215 lb)   SpO2 98%   BMI 30.17 kg/m      Physical Exam  GENERAL: alert, no distress and over weight  EYES: Eyes grossly normal to inspection, PERRL and conjunctivae and sclerae normal  HENT: Right ear canal is clear and the TM appears normal.  Left ear canal is clear with a green PE tube in the TM.  No drainage at this time.  NECK: no adenopathy, no asymmetry, masses, or scars and thyroid normal to palpation  RESP: lungs clear to auscultation - no rales, rhonchi or wheezes  CV: regular rate and rhythm, normal S1 S2, no S3 or S4, no murmur, click or rub, no peripheral edema and peripheral pulses strong  ABDOMEN: soft, nontender, no hepatosplenomegaly, no masses   MS: no gross musculoskeletal defects noted, no edema.  He has some fungal thickening of his left great toe and to a lesser extent some other toes.  SKIN: no suspicious lesions or rashes  NEURO: Normal strength and tone, mentation intact and speech normal  PSYCH: mentation appears normal, affect normal/bright    Diagnostic Test Results:  Labs reviewed in Epic    ASSESSMENT/PLAN:       ICD-10-CM    1. Routine general medical examination at a health care facility  Z00.00 Prostate spec antigen screen   2. Hyperlipidemia LDL goal <100  E78.5 Lipid panel reflex to direct LDL Fasting     simvastatin (ZOCOR) 20 MG tablet     ALT   3. Other eczema  L30.8 triamcinolone (KENALOG) " "0.1 % external ointment   4. Stage 3a chronic kidney disease  N18.31 BASIC METABOLIC PANEL     Albumin Random Urine Quantitative with Creat Ratio     CBC with platelets   5. Conductive hearing loss of left ear with unrestricted hearing of right ear  H90.12    6. Onychomycosis  B35.1      Blood pressure and other vital signs look good  We discussed the above items  We will check fasting lab work today as above  Continue same medication  Continue routine ENT follow-up for the left ear  We discussed his onychomycosis and various treatment options for that and he may try an over-the-counter product for it  We discussed the Shingrix vaccine and we may consider that in the future, but we will hold on it for now  Plan a recheck in 1 year, or sooner prn    Patient has been advised of split billing requirements and indicates understanding: Yes  COUNSELING:   Reviewed preventive health counseling, as reflected in patient instructions       Regular exercise       Healthy diet/nutrition    Estimated body mass index is 30.17 kg/m  as calculated from the following:    Height as of this encounter: 1.798 m (5' 10.79\").    Weight as of this encounter: 97.5 kg (215 lb).     Weight management plan: Discussed healthy diet and exercise guidelines    He reports that he has never smoked. He has never used smokeless tobacco.      Counseling Resources:  ATP IV Guidelines  Pooled Cohorts Equation Calculator  FRAX Risk Assessment  ICSI Preventive Guidelines  Dietary Guidelines for Americans, 2010  USDA's MyPlate  ASA Prophylaxis  Lung CA Screening    To Whitt MD  [facility]  "

## 2021-01-15 DIAGNOSIS — H69.92 DYSFUNCTION OF LEFT EUSTACHIAN TUBE: ICD-10-CM

## 2021-01-19 RX ORDER — CLOTRIMAZOLE 1 G/ML
SOLUTION TOPICAL
Qty: 30 ML | Refills: 0 | OUTPATIENT
Start: 2021-01-19

## 2021-01-19 NOTE — TELEPHONE ENCOUNTER
CLOTRIMAZOLE 1% SOLUTION  clotrimazole (LOTRIMIN) 1 % external solution (Discontinued) 15 mL 0 7/30/2019 10/8/2019 --   Sig: Instill 5 drops to the affected ear 2 times daily for 10 days.   Sent to pharmacy as: clotrimazole (LOTRIMIN) 1 % external solution   Class: E-Prescribe   Order: [id number]   E-Prescribing Status: Receipt confirmed by pharmacy (7/30/2019 10:03 AM CDT)   Printout Tracking    External Result Report   Medication Administration Instructions    Instill 5 drops to the affected ear 2 times daily for 10 days.   Pharmacy    [id number] [place] [address]   This Order Has Been Discontinued    Order Status Reason By On   Discontinued None To Whitt MD 10/8/19 0712     Cheri Cross RN  Central Triage Red Flags/Med Refills

## 2021-01-20 DIAGNOSIS — H92.12 OTORRHEA, LEFT: ICD-10-CM

## 2021-01-20 NOTE — PATIENT INSTRUCTIONS
1. You were seen in the ENT Clinic today by Dr. Rogel.  If you have any questions or concerns after your appointment, please call   - Option 1: ENT Clinic: [phone]   - Option 2: Bettie (Dr. Rogel's Nurse): [phone]  2.   Plan to return to clinic in 1 mth  3. Ear cultures- left ear- will call with results    4. Refill Alonso Forbes LPN  [facility] - Otolaryngology

## 2021-01-21 ENCOUNTER — OFFICE VISIT (OUTPATIENT)
Dept: OTOLARYNGOLOGY | Facility: CLINIC | Age: 51
End: 2021-01-21
Payer: COMMERCIAL

## 2021-01-21 VITALS
TEMPERATURE: 96.4 F | BODY MASS INDEX: 30.31 KG/M2 | HEART RATE: 74 BPM | DIASTOLIC BLOOD PRESSURE: 84 MMHG | OXYGEN SATURATION: 100 % | SYSTOLIC BLOOD PRESSURE: 127 MMHG | WEIGHT: 216 LBS

## 2021-01-21 DIAGNOSIS — H92.12 OTORRHEA, LEFT: Primary | ICD-10-CM

## 2021-01-21 PROCEDURE — 99214 OFFICE O/P EST MOD 30 MIN: CPT | Mod: 25 | Performed by: OTOLARYNGOLOGY

## 2021-01-21 PROCEDURE — 92504 EAR MICROSCOPY EXAMINATION: CPT | Mod: GC | Performed by: OTOLARYNGOLOGY

## 2021-01-21 PROCEDURE — 87186 SC STD MICRODIL/AGAR DIL: CPT | Mod: 90 | Performed by: PATHOLOGY

## 2021-01-21 PROCEDURE — 87077 CULTURE AEROBIC IDENTIFY: CPT | Mod: 90 | Performed by: PATHOLOGY

## 2021-01-21 PROCEDURE — 87070 CULTURE OTHR SPECIMN AEROBIC: CPT | Mod: 90 | Performed by: PATHOLOGY

## 2021-01-21 PROCEDURE — 99000 SPECIMEN HANDLING OFFICE-LAB: CPT | Performed by: PATHOLOGY

## 2021-01-21 PROCEDURE — 87102 FUNGUS ISOLATION CULTURE: CPT | Mod: 90 | Performed by: PATHOLOGY

## 2021-01-21 RX ORDER — ACETIC ACID 20.65 MG/ML
SOLUTION AURICULAR (OTIC)
Qty: 15 ML | Refills: 11 | Status: SHIPPED | OUTPATIENT
Start: 2021-01-21 | End: 2022-02-09

## 2021-01-21 ASSESSMENT — PAIN SCALES - GENERAL: PAINLEVEL: MODERATE PAIN (4)

## 2021-01-21 NOTE — PROGRESS NOTES
"Name: Suhail Mustafa  MRN: [id number]  Age: 49 year old  : 1970  Referring provider: To Whitt  2020      Chief Complaint:   Left bloody otorrhea     History of Present Illness:   Suhail Mustafa is a 49 year old male with a history of left eustachian tube dysfunction s/p left myringotomy with diode laser and T-tube placement who presents for follow up. He was last seen on  at which time his T-tube was noted to be in place but partially obstructed with fibrous tissue and circumferential cholesteatoma and cerumenous debris. The patient reports that he had been doing well until 4 days ago when he had a fit of sneezing and later noted blood on his hearing aid. Since then he has had stable otalgia, fullness, and decreased hearing in that ear. He has not noticed otorrhea since then.    Physical Exam:   BP (!) 129/93   Pulse 68   Temp 98  F (36.7  C)   Resp 18   Ht 1.803 m (5' 11\")   Wt 97.5 kg (215 lb)   SpO2 100%   BMI 29.99 kg/m       Male in no acute distress. Alert and answering questions appropriately. HB 1/6 bilaterally.  Left ear examined under the microscope. Left T-tube is in place and there is blood seen in the lumen. There is dried blood on the floor of the canal. There is purulence on the surface of the tympanic membrane, which was cultured and then removed with suctioning.     Assessment and Plan:  Suhail Mustafa is a 49 year old male with a history of left eustachian tube dysfunction s/p left myringotomy with diode laser and T-tube placement who presents for follow up with left-sided bloody otorrhea and purulence.    1. Left bloody otorrhea  - No further bleeding, appears to have originated from the middle ear with blood in the lumen of the t-tube  - Follow-up aerobic and anaerobic cultures, blood cultures. We will place him on appropriate drops and possibly oral antibiotics depending on those results.  - F/u in 1 month for repeat examination  - Prescription for Vosol " refilled today    The patient was seen and the plan discussed with Dr. Yaneth Rogel.    Tanya Mccormack MD PGY2  ENT Resident    I, Yaneth Rogel MD, saw this patient with the resident/fellow and agree with the resident/fellow s findings and plan of care as documented in the resident s/fellow s note. I was present for the entire procedure.    Yaneth Rogel MD

## 2021-01-21 NOTE — NURSING NOTE
Chief Complaint   Patient presents with     RECHECK     left ear bleeding      Blood pressure 127/84, pulse 74, temperature 96.4  F (35.8  C), weight 98 kg (216 lb), SpO2 100 %.    Magdi Timmons LPN

## 2021-01-21 NOTE — LETTER
"2021      RE: Suhail Mustafa  [address]   [facility] [address]       Name: Suhail Mustafa  MRN: [id number]  Age: 49 year old  : 1970  Referring provider: To Whitt  2020      Chief Complaint:   Left bloody otorrhea     History of Present Illness:   Suhail Mustafa is a 49 year old male with a history of left eustachian tube dysfunction s/p left myringotomy with diode laser and T-tube placement who presents for follow up. He was last seen on  at which time his T-tube was noted to be in place but partially obstructed with fibrous tissue and circumferential cholesteatoma and cerumenous debris. The patient reports that he had been doing well until 4 days ago when he had a fit of sneezing and later noted blood on his hearing aid. Since then he has had stable otalgia, fullness, and decreased hearing in that ear. He has not noticed otorrhea since then.    Physical Exam:   BP (!) 129/93   Pulse 68   Temp 98  F (36.7  C)   Resp 18   Ht 1.803 m (5' 11\")   Wt 97.5 kg (215 lb)   SpO2 100%   BMI 29.99 kg/m       Male in no acute distress. Alert and answering questions appropriately. HB 1/6 bilaterally.  Left ear examined under the microscope. Left T-tube is in place and there is blood seen in the lumen. There is dried blood on the floor of the canal. There is purulence on the surface of the tympanic membrane, which was cultured and then removed with suctioning.     Assessment and Plan:  Suhail Mustafa is a 49 year old male with a history of left eustachian tube dysfunction s/p left myringotomy with diode laser and T-tube placement who presents for follow up with left-sided bloody otorrhea and purulence.    1. Left bloody otorrhea  - No further bleeding, appears to have originated from the middle ear with blood in the lumen of the t-tube  - Follow-up aerobic and anaerobic cultures, blood cultures. We will place him on appropriate drops and possibly oral antibiotics depending on " those results.  - F/u in 1 month for repeat examination  - Prescription for Vosol refilled today    The patient was seen and the plan discussed with Dr. Yaneth Rogel.    Tanya Mccormack MD PGY2  ENT Resident    I, Yaneth Rogel MD, saw this patient with the resident/fellow and agree with the resident/fellow s findings and plan of care as documented in the resident s/fellow s note. I was present for the entire procedure.    Yaneth Rogel MD

## 2021-01-22 RX ORDER — SULFACETAMIDE SODIUM AND PREDNISOLONE SODIUM PHOSPHATE 100; 2.3 MG/ML; MG/ML
SOLUTION/ DROPS OPHTHALMIC
Qty: 5 ML | Refills: 1 | Status: SHIPPED | OUTPATIENT
Start: 2021-01-22 | End: 2021-02-03

## 2021-01-22 RX ORDER — SULFAMETHOXAZOLE/TRIMETHOPRIM 800-160 MG
1 TABLET ORAL 2 TIMES DAILY
Qty: 42 TABLET | Refills: 0 | Status: SHIPPED | OUTPATIENT
Start: 2021-01-22 | End: 2021-02-12

## 2021-01-22 RX ORDER — SULFACETAMIDE SODIUM AND PREDNISOLONE SODIUM PHOSPHATE 100; 2.3 MG/ML; MG/ML
SOLUTION/ DROPS OPHTHALMIC
Qty: 5 ML | Refills: 3 | OUTPATIENT
Start: 2021-01-22

## 2021-01-24 LAB
BACTERIA SPEC CULT: ABNORMAL
Lab: ABNORMAL
SPECIMEN SOURCE: ABNORMAL

## 2021-01-27 NOTE — PATIENT INSTRUCTIONS
1. You were seen in the ENT Clinic today by Dr. Rogel.  If you have any questions or concerns after your appointment, please call   - Option 1: ENT Clinic: [phone]   - Option 2: Bettie (Dr. Rogel's Nurse): [phone]    2.   Plan to return to clinic in 3 months    3. Ear culture- left- will call with results    Bettie Forbes LPN  [facility] - Otolaryngology

## 2021-02-03 ENCOUNTER — OFFICE VISIT (OUTPATIENT)
Dept: OTOLARYNGOLOGY | Facility: CLINIC | Age: 51
End: 2021-02-03
Payer: COMMERCIAL

## 2021-02-03 VITALS
SYSTOLIC BLOOD PRESSURE: 115 MMHG | WEIGHT: 216.05 LBS | HEART RATE: 79 BPM | OXYGEN SATURATION: 86 % | DIASTOLIC BLOOD PRESSURE: 78 MMHG | HEIGHT: 71 IN | BODY MASS INDEX: 30.25 KG/M2 | TEMPERATURE: 86.7 F

## 2021-02-03 DIAGNOSIS — H92.12 OTORRHEA, LEFT: Primary | ICD-10-CM

## 2021-02-03 PROCEDURE — 99000 SPECIMEN HANDLING OFFICE-LAB: CPT | Performed by: PATHOLOGY

## 2021-02-03 PROCEDURE — 99213 OFFICE O/P EST LOW 20 MIN: CPT | Mod: 25 | Performed by: OTOLARYNGOLOGY

## 2021-02-03 PROCEDURE — 87102 FUNGUS ISOLATION CULTURE: CPT | Mod: 90 | Performed by: PATHOLOGY

## 2021-02-03 PROCEDURE — 87070 CULTURE OTHR SPECIMN AEROBIC: CPT | Mod: 90 | Performed by: PATHOLOGY

## 2021-02-03 PROCEDURE — 92504 EAR MICROSCOPY EXAMINATION: CPT | Performed by: OTOLARYNGOLOGY

## 2021-02-03 ASSESSMENT — PAIN SCALES - GENERAL: PAINLEVEL: MILD PAIN (3)

## 2021-02-03 ASSESSMENT — MIFFLIN-ST. JEOR: SCORE: 1852.13

## 2021-02-03 NOTE — LETTER
2/3/2021      RE: Suhail Mustafa  [address]   [facility] [address]       Suhail Mustafa is seen for a left ear check. He grew staph on his culture a few weeks ago and has been on oral and topical antibiotics. He has finished his Bactrim and is still using the Vasocidin. He says the ear feels better but is itchy. He continues to wear his hearing aid fairly regularly.    Physical examination:  male in no acute distress.  Alert and answering questions appropriately.  HB 1/6 bilaterally.  Left ear examined under the microscope. Moist debris around the base of the t-tube but the lumen is dry and the middle ear mucosa appears healthy through the lumen, TM remains thick throughout, debris was cultured and cleaned.    Assessment and plan:  Improved left otorrhea. We'll have him stop his drops for now although he knows to use the Vosol as needed when he has symptoms. We'll call him with the culture results and I asked that he continue to leave his hearing aid out when he can for this next week. We'll see him again in 3 months.        Yaneth Rogel MD

## 2021-02-03 NOTE — NURSING NOTE
"Chief Complaint   Patient presents with     RECHECK     follow up with audio     Blood pressure 115/78, pulse 79, temperature (!) 86.7  F (30.4  C), height 1.803 m (5' 11\"), weight 98 kg (216 lb 0.8 oz), SpO2 (!) 86 %.    Magdi Timmons LPN    "

## 2021-02-03 NOTE — PROGRESS NOTES
Suhail Mustafa is seen for a left ear check. He grew staph on his culture a few weeks ago and has been on oral and topical antibiotics. He has finished his Bactrim and is still using the Vasocidin. He says the ear feels better but is itchy. He continues to wear his hearing aid fairly regularly.    Physical examination:  male in no acute distress.  Alert and answering questions appropriately.  HB 1/6 bilaterally.  Left ear examined under the microscope. Moist debris around the base of the t-tube but the lumen is dry and the middle ear mucosa appears healthy through the lumen, TM remains thick throughout, debris was cultured and cleaned.    Assessment and plan:  Improved left otorrhea. We'll have him stop his drops for now although he knows to use the Vosol as needed when he has symptoms. We'll call him with the culture results and I asked that he continue to leave his hearing aid out when he can for this next week. We'll see him again in 3 months.

## 2021-02-05 LAB
BACTERIA SPEC CULT: NO GROWTH
SPECIMEN SOURCE: NORMAL

## 2021-02-08 ENCOUNTER — TELEPHONE (OUTPATIENT)
Dept: OTOLARYNGOLOGY | Facility: CLINIC | Age: 51
End: 2021-02-08

## 2021-02-08 NOTE — TELEPHONE ENCOUNTER
"----- Message from Yaneth Rogel MD sent at 2/5/2021  3:18 PM CST -----  \"No bacterial growth. Back to Vosol as needed and keep the 3 month follow up.\"    Pt was notified and had no questions.    Bettie Forbes LPN        "

## 2021-02-16 ENCOUNTER — E-VISIT (OUTPATIENT)
Dept: FAMILY MEDICINE | Facility: CLINIC | Age: 51
End: 2021-02-16
Payer: COMMERCIAL

## 2021-02-16 DIAGNOSIS — R21 RASH: Primary | ICD-10-CM

## 2021-02-16 PROCEDURE — 99421 OL DIG E/M SVC 5-10 MIN: CPT | Performed by: FAMILY MEDICINE

## 2021-02-18 LAB
FUNGUS SPEC CULT: NORMAL
Lab: NORMAL
SPECIMEN SOURCE: NORMAL

## 2021-03-03 LAB
FUNGUS SPEC CULT: NORMAL
SPECIMEN SOURCE: NORMAL

## 2021-04-23 NOTE — PATIENT INSTRUCTIONS
1. You were seen in the ENT Clinic today by Dr. Rogel.  If you have any questions or concerns after your appointment, please call   - Option 1: ENT Clinic: [phone]   - Option 2: Bettie (Dr. Rogel's Nurse): [phone]    2.   Plan to return to clinic in 4 months     Bettie Forbes LPN  [facility] - Otolaryngology

## 2021-05-05 ENCOUNTER — TELEPHONE (OUTPATIENT)
Dept: OTOLARYNGOLOGY | Facility: CLINIC | Age: 51
End: 2021-05-05

## 2021-05-05 ENCOUNTER — OFFICE VISIT (OUTPATIENT)
Dept: OTOLARYNGOLOGY | Facility: CLINIC | Age: 51
End: 2021-05-05
Payer: COMMERCIAL

## 2021-05-05 VITALS
TEMPERATURE: 97.3 F | BODY MASS INDEX: 30.25 KG/M2 | HEART RATE: 64 BPM | DIASTOLIC BLOOD PRESSURE: 86 MMHG | SYSTOLIC BLOOD PRESSURE: 119 MMHG | WEIGHT: 216.05 LBS | HEIGHT: 71 IN | OXYGEN SATURATION: 99 %

## 2021-05-05 DIAGNOSIS — H69.92 DYSFUNCTION OF LEFT EUSTACHIAN TUBE: Primary | ICD-10-CM

## 2021-05-05 PROCEDURE — 92504 EAR MICROSCOPY EXAMINATION: CPT | Performed by: OTOLARYNGOLOGY

## 2021-05-05 PROCEDURE — 99212 OFFICE O/P EST SF 10 MIN: CPT | Mod: 25 | Performed by: OTOLARYNGOLOGY

## 2021-05-05 ASSESSMENT — MIFFLIN-ST. JEOR: SCORE: 1852.13

## 2021-05-05 ASSESSMENT — PAIN SCALES - GENERAL: PAINLEVEL: NO PAIN (0)

## 2021-05-05 NOTE — LETTER
5/5/2021      RE: Suhail MICHELLE Ramsey  [address]   [facility] [address]       Suhail Mustafa is seen for an ear check. He says that the left ear had been full for a little over 2 weeks and he was using the Vosol regularly. It then cleared this last Saturday. It felt a little full again on Sunday so he used drops and he put some drops in this morning because it felt a little full. No changes in hearing, no overt otorrhea, no otalgia. Right ear fine.    Physical examination:  male in no acute distress.  Alert and answering questions appropriately.  HB 1/6 bilaterally.  Both ears examined under the microscope. Right ear canal clear, TM intact with an aerated middle ear. Left ear canal wet with drops, t-tube in position and open, it lays on the anterior ear canal, a small amount of skin was cleaned off the base and shaft, cartilage in good position and middle ear aerated.    Assessment and plan:  Doing well. We'll see him in 4 months.        Yaneth Rogel MD

## 2021-05-05 NOTE — NURSING NOTE
"Chief Complaint   Patient presents with     RECHECK     follow up      Blood pressure 119/86, pulse 64, temperature 97.3  F (36.3  C), height 1.803 m (5' 11\"), weight 98 kg (216 lb 0.8 oz), SpO2 99 %.    Magdi Timmons LPN    "

## 2021-05-05 NOTE — PROGRESS NOTES
Suhail Mustafa is seen for an ear check. He says that the left ear had been full for a little over 2 weeks and he was using the Vosol regularly. It then cleared this last Saturday. It felt a little full again on Sunday so he used drops and he put some drops in this morning because it felt a little full. No changes in hearing, no overt otorrhea, no otalgia. Right ear fine.    Physical examination:  male in no acute distress.  Alert and answering questions appropriately.  HB 1/6 bilaterally.  Both ears examined under the microscope. Right ear canal clear, TM intact with an aerated middle ear. Left ear canal wet with drops, t-tube in position and open, it lays on the anterior ear canal, a small amount of skin was cleaned off the base and shaft, cartilage in good position and middle ear aerated.    Assessment and plan:  Doing well. We'll see him in 4 months.

## 2021-09-12 ENCOUNTER — HEALTH MAINTENANCE LETTER (OUTPATIENT)
Age: 51
End: 2021-09-12

## 2021-09-15 ENCOUNTER — OFFICE VISIT (OUTPATIENT)
Dept: OTOLARYNGOLOGY | Facility: CLINIC | Age: 51
End: 2021-09-15
Payer: COMMERCIAL

## 2021-09-15 VITALS — BODY MASS INDEX: 30.24 KG/M2 | WEIGHT: 216 LBS | HEIGHT: 71 IN

## 2021-09-15 DIAGNOSIS — H69.92 DYSFUNCTION OF LEFT EUSTACHIAN TUBE: Primary | ICD-10-CM

## 2021-09-15 PROCEDURE — 92504 EAR MICROSCOPY EXAMINATION: CPT | Performed by: OTOLARYNGOLOGY

## 2021-09-15 ASSESSMENT — MIFFLIN-ST. JEOR: SCORE: 1846.9

## 2021-09-15 NOTE — PROGRESS NOTES
"  Otolaryngology Clinic      Name: Suhail Mustafa  MRN: [id number]  Age: 51 year old  : 1970  09/15/2021      Chief Complaint:   Follow up    History of Present Illness:   Suhail Mustafa is a 51 year old male who presents for an ear check. At the patient's last visit 4 months ago (2021), he reported two weeks of ear fullness, despite using Vosol regularly. He reported it resolved for one day and returned. He denied any changes in hearing, otorrhea, or otalgia.     Today, he reports doing well and using Vosol regularly.     Physical Exam:   Ht 1.803 m (5' 11\")   Wt 98 kg (216 lb)   BMI 30.13 kg/m       Male in no acute distress.  Alert and answering questions appropriately.  HB 1/6 bilaterally.  Left ear examined under the microscope. Left ear canal wet with drops, t-tube in position and open, and this lays on the anterior ear canal, a small amount of skin was cleaned off of the t-tube base and shaft, cartilage is in good position and the middle ear is aerated. No retraction pocket evident.      Assessment and Plan:  Suhail Mustafa is a 51 year old male who presents for an ear check. He's doing well. We'll see him again in 4 months.      Scribe Disclosure:  I, Rosalia Guerrero, am serving as a scribe to document services personally performed by Yaneth Rogel MD at this visit, based upon the provider's statements to me. All documentation has been reviewed by the aforementioned provider prior to being entered into the official medical record.     The documentation recorded by the scribe accurately reflects the services I personally performed and the decisions made by me.    "

## 2021-09-15 NOTE — LETTER
"9/15/2021      RE: Suhail Mustafa  [address]   [facility] [address]         Otolaryngology Clinic      Name: Suhail Mustafa  MRN: [id number]  Age: 51 year old  : 1970  09/15/2021      Chief Complaint:   Follow up    History of Present Illness:   Suhail Mustafa is a 51 year old male who presents for an ear check. At the patient's last visit 4 months ago (2021), he reported two weeks of ear fullness, despite using Vosol regularly. He reported it resolved for one day and returned. He denied any changes in hearing, otorrhea, or otalgia.     Today, he reports doing well and using Vosol regularly.     Physical Exam:   Ht 1.803 m (5' 11\")   Wt 98 kg (216 lb)   BMI 30.13 kg/m       Male in no acute distress.  Alert and answering questions appropriately.  HB 1/6 bilaterally.  Left ear examined under the microscope. Left ear canal wet with drops, t-tube in position and open, and this lays on the anterior ear canal, a small amount of skin was cleaned off of the t-tube base and shaft, cartilage is in good position and the middle ear is aerated. No retraction pocket evident.      Assessment and Plan:  Suhail Mustafa is a 51 year old male who presents for an ear check. He's doing well. We'll see him again in 4 months.      Scribe Disclosure:  I, Rosalia Guerrero, am serving as a scribe to document services personally performed by Yaneth Rogel MD at this visit, based upon the provider's statements to me. All documentation has been reviewed by the aforementioned provider prior to being entered into the official medical record.     The documentation recorded by the scribe accurately reflects the services I personally performed and the decisions made by me.        Yaneth Rogel MD      "

## 2021-09-15 NOTE — Clinical Note
"9/15/2021       RE: Suhail Mustafa  [address]   [facility] [address]     Dear Colleague,    Thank you for referring your patient, Suhail Mustafa, to the [facility] EAR NOSE AND THROAT CLINIC [place] at [facility]. Please see a copy of my visit note below.      Otolaryngology Clinic      Name: Suhail Mustafa  MRN: [id number]  Age: 51 year old  : 1970  09/15/2021      Chief Complaint:   Follow up    History of Present Illness:   Suhail Mustafa is a 51 year old male who presents for an ear check. At the patient's last visit 4 months ago (2021), he reported two weeks of ear fullness, despite using Vosol regularly. He reported it resolved for one day and returned. He denied any changes in hearing, otorrhea, or otalgia.     Today, he reports doing well and using drops persistently.     Physical Exam:   Ht 1.803 m (5' 11\")   Wt 98 kg (216 lb)   BMI 30.13 kg/m       Physical examination:  Male in no acute distress.  Alert and answering questions appropriately.  HB 1/6 bilaterally.  Left ear examined under the microscope. Left ear canal wet with drops, t-tube in position and open, and this lays on the anterior ear canal, a small amount of skin was cleaned off of the t-tube base and shaft, cartilage is in good position and the middle ear is aerated. No retraction pocket evident.      Assessment and Plan:  Suhail Mustafa is a 51 year old male who presents for an ear check. He's doing well. We'll see him again in 4 months.    Follow-up: No follow-ups on file.       Scribe Disclosure:  I, Rosalia Guerrero, am serving as a scribe to document services personally performed by Yaneth Rogel MD at this visit, based upon the provider's statements to me. All documentation has been reviewed by the aforementioned provider prior to being entered into the official medical record.         Again, thank you for allowing me to participate in the care of " your patient.      Sincerely,    Yaneth Rogel MD

## 2021-09-15 NOTE — PATIENT INSTRUCTIONS
1. You were seen in the ENT Clinic today by Dr. Rogel.  If you have any questions or concerns after your appointment, please call   - Option 1: ENT Clinic: [phone]   - Option 2: Bettie (Dr. Rogel's Nurse): [phone]                   Jacquelyn(Dr. Rogel's Nurse): [phone]    2.   Plan to return to clinic in 4 months  Bettie Forbes LPN  [facility] - Otolaryngology

## 2021-09-15 NOTE — NURSING NOTE
"Chief Complaint   Patient presents with     RECHECK     Height 1.803 m (5' 11\"), weight 98 kg (216 lb).    Bettie Forbes LPN    "

## 2021-11-15 ENCOUNTER — MYC MEDICAL ADVICE (OUTPATIENT)
Dept: FAMILY MEDICINE | Facility: CLINIC | Age: 51
End: 2021-11-15
Payer: COMMERCIAL

## 2021-11-15 DIAGNOSIS — M79.673 HEEL PAIN, UNSPECIFIED LATERALITY: Primary | ICD-10-CM

## 2022-01-17 ENCOUNTER — TELEPHONE (OUTPATIENT)
Dept: OTOLARYNGOLOGY | Facility: CLINIC | Age: 52
End: 2022-01-17
Payer: COMMERCIAL

## 2022-01-24 ENCOUNTER — OFFICE VISIT (OUTPATIENT)
Dept: OTOLARYNGOLOGY | Facility: CLINIC | Age: 52
End: 2022-01-24
Payer: COMMERCIAL

## 2022-01-24 VITALS
WEIGHT: 214 LBS | HEIGHT: 71 IN | RESPIRATION RATE: 18 BRPM | DIASTOLIC BLOOD PRESSURE: 77 MMHG | SYSTOLIC BLOOD PRESSURE: 114 MMHG | BODY MASS INDEX: 29.96 KG/M2 | TEMPERATURE: 97.6 F | HEART RATE: 63 BPM | OXYGEN SATURATION: 100 %

## 2022-01-24 DIAGNOSIS — H69.92 DYSFUNCTION OF LEFT EUSTACHIAN TUBE: Primary | ICD-10-CM

## 2022-01-24 PROCEDURE — 99212 OFFICE O/P EST SF 10 MIN: CPT | Mod: 25 | Performed by: OTOLARYNGOLOGY

## 2022-01-24 PROCEDURE — 92504 EAR MICROSCOPY EXAMINATION: CPT | Mod: GC | Performed by: OTOLARYNGOLOGY

## 2022-01-24 ASSESSMENT — PAIN SCALES - GENERAL: PAINLEVEL: MILD PAIN (3)

## 2022-01-24 ASSESSMENT — MIFFLIN-ST. JEOR: SCORE: 1837.83

## 2022-01-24 NOTE — PROGRESS NOTES
Otolaryngology Clinic      Name: Suhail Mustafa  MRN: [id number]  Age: 51 year old  : 2024      Chief Complaint:   Follow up    History of Present Illness:   Suhail Mustafa is a 51 year old male who presents for follow up. At his last visit 4 months ago (09/15/2021), he reported intermittent use of Vosol drops and was doing well. His left t-tube was patent and in position.     Today, the patient reports that he has been having increased drainage in his left ear and mild pain for the past several weeks. He usually uses his vosol drops once a week but has been using drops 3-4 times a weeks due to the drainage. His drainage started improving in the past week. Reports that his left hearing feels slightly worse. No significant changes in tinnitus or vertigo.     Physical examination:  Male in no acute distress.  Alert and answering questions appropriately.  HB 1/6 bilaterally.  Bilateral ears examined under the microscope.  - Right: Ear canal clear. TM intact without effusion or retraction. Middle ear well aerated.   - Left: Ear canal clear. Small amount of wet debris collecting in the inferior medial canal suctioned. T-tube in place anteriorly and patent. Mucosalized TM superior to malleus. Middle ear is aerated.      Assessment and Plan:  Suhail Mustafa is a 51 year old male who presents for follow up. He has had increased left ear drainage for last several weeks which has been improving with more frequent vosol drops. 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 Recommended continuing vosol drops more frequently as needed and keep his ear dry by leaving out hearing aid when he doesn't need it. We discussed that we can consider TCA application to improve his superior TM mucosalization if his drainage continues in the future. He will return in 6 months for left ear check.     Follow-up: in 6 months    Letitia Pratt MD MPH   Fellow Physician  Otology &  Neurotology  [facility]     Scribe Preparation Attestation:  I, Rosalia Guerrero, a scribe, prepared the chart for today's encounter.     I, Yaneth Rogel MD, saw this patient with the resident/fellow and agree with the resident/fellow s findings and plan of care as documented in the resident s/fellow s note. I was present for the entire procedure.    Yaneth Rogel MD    The documentation recorded by the scribe accurately reflects the services I personally performed and the decisions made by me.

## 2022-01-24 NOTE — PATIENT INSTRUCTIONS
1. You were seen in the ENT Clinic today by Dr. Rogel.  If you have any questions or concerns after your appointment, please call   - Option 1: ENT Clinic: [phone]   - Option 2: Bettie (Dr. Rogel's Nurse): [phone]                   Jacquelyn(Dr. Rogel's Nurse): [phone]    2.   Plan to return to clinic in 6 months     Bettie Forbes LPN  [facility] - Otolaryngology

## 2022-01-24 NOTE — LETTER
2022      RE: Suhail Mustafa  [address]   [facility] [address]         Otolaryngology Clinic      Name: Suhail Mustafa  MRN: [id number]  Age: 51 year old  : 2024      Chief Complaint:   Follow up    History of Present Illness:   Suhail Mustafa is a 51 year old male who presents for follow up. At his last visit 4 months ago (09/15/2021), he reported intermittent use of Vosol drops and was doing well. His left t-tube was patent and in position.     Today, the patient reports that he has been having increased drainage in his left ear and mild pain for the past several weeks. He usually uses his vosol drops once a week but has been using drops 3-4 times a weeks due to the drainage. His drainage started improving in the past week. Reports that his left hearing feels slightly worse. No significant changes in tinnitus or vertigo.     Physical examination:  Male in no acute distress.  Alert and answering questions appropriately.  HB 1/6 bilaterally.  Bilateral ears examined under the microscope.  - Right: Ear canal clear. TM intact without effusion or retraction. Middle ear well aerated.   - Left: Ear canal clear. Small amount of wet debris collecting in the inferior medial canal suctioned. T-tube in place anteriorly and patent. Mucosalized TM superior to malleus. Middle ear is aerated.      Assessment and Plan:  Suhail Mustafa is a 51 year old male who presents for follow up. He has had increased left ear drainage for last several weeks which has been improving with more frequent vosol drops. 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 Recommended continuing vosol drops more frequently as needed and keep his ear dry by leaving out hearing aid when he doesn't need it. We discussed that we can consider TCA application to improve his superior TM mucosalization if his drainage continues in the future. He will return in 6 months for left ear check.     Follow-up:  in 6 months    Letitia Pratt MD MPH   Fellow Physician  Otology & Neurotology  [facility]     Scribe Preparation Attestation:  I, Rosalia Guerrero, a scribe, prepared the chart for today's encounter.     I, Yaneth Rogel MD, saw this patient with the resident/fellow and agree with the resident/fellow s findings and plan of care as documented in the resident s/fellow s note. I was present for the entire procedure.      Yaneth Rogel MD    The documentation recorded by the scribe accurately reflects the services I personally performed and the decisions made by me.

## 2022-01-24 NOTE — Clinical Note
2022       RE: Suhail Mustafa  [address]   [facility] [address]     Dear Colleague,    Thank you for referring your patient, Suhail Mustafa, to the [facility] EAR NOSE AND THROAT CLINIC [place] at [facility]. Please see a copy of my visit note below.      Otolaryngology Clinic      Name: Suhail Mustafa  MRN: [id number]  Age: 51 year old  : 2024      Chief Complaint:   Follow up    History of Present Illness:   Suhail Mustafa is a 51 year old male who presents for follow up. About one year ago, the patient denied any improvement in ear fullness with using Vosol drops that I prescribed for him. At his last visit 4 months ago (09/15/2021), he reported persistent use of Vosol drops and was doing well. Both of his t-tubes were patent and in position.     Today, the patient reports that he has been having increased drainage in left ear and mild pain for the past several weeks. He usually uses his vosol drops once a week but has been using drops 3-4 times a weeks due to the drainage. His drainage started improving in the past week. Reports that his left hearing feels slightly worse. No significant changes in tinnitus or vertigo.     Physical Exam:   There were no vitals taken for this visit.     Physical examination:  Male in no acute distress.  Alert and answering questions appropriately.  HB 1/6 bilaterally.  Bilateral ears examined under the microscope.  - Right: Ear canal clear. TM intact without effusion or retraction. Middle ear well aerated.   - Left: Ear canal clear. Small amount of wet debris collecting inferior medial canal suctioned. T-tube in place anteriorly and patent. Mucosalized TM superior to malleus with inflamed appearance. Middle ear is aerated.      Assessment and Plan:  Suhail Mustafa is a 51 year old male who presents for follow up. He has had increased left ear drainage for last several weeks which  has been improving with more frequent vosol drops. 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 Recommended continuing vosol drops more frequently as needed and keep his ear dry by leaving out hearing aid when he doesn't need it. We discussed that we can consider TCA application to improve his superior TM mucosalization if his drainage continues in the future. He will return in 6 months for left ear check.     Follow-up: in 6 months    Letitia Pratt MD MPH   Fellow Physician  Otology & Neurotology  [facility]       Scribe Preparation Attestation:  I, Rosalia Guerrero, a scribe, prepared the chart for today's encounter.         Again, thank you for allowing me to participate in the care of your patient.      Sincerely,    Yaneth Rogel MD

## 2022-01-29 DIAGNOSIS — E78.5 HYPERLIPIDEMIA LDL GOAL <100: ICD-10-CM

## 2022-02-01 RX ORDER — SIMVASTATIN 20 MG
TABLET ORAL
Qty: 30 TABLET | Refills: 0 | Status: SHIPPED | OUTPATIENT
Start: 2022-02-01 | End: 2022-03-03

## 2022-02-01 NOTE — TELEPHONE ENCOUNTER
"Requested Prescriptions   Pending Prescriptions Disp Refills     simvastatin (ZOCOR) 20 MG tablet [Pharmacy Med Name: SIMVASTATIN 20 MG TABLET] 30 tablet 11     Sig: TAKE 1 TABLET BY MOUTH AT BEDTIME       Statins Protocol Failed - 1/29/2022  9:07 AM        Failed - LDL on file in past 12 months     Recent Labs   Lab Test 01/05/21  0748   LDL 89           Passed - No abnormal creatine kinase in past 12 months     No lab results found.           Passed - Recent (12 mo) or future (30 days) visit within the authorizing provider's specialty     Patient has had an office visit with the authorizing provider or a provider within the authorizing providers department within the previous 12 mos or has a future within next 30 days. See \"Patient Info\" tab in inbasket, or \"Choose Columns\" in Meds & Orders section of the refill encounter.            Passed - Medication is active on med list        Passed - Patient is age 18 or older           Last annual physical: 1/5/21  Last labs: 1/5/21    Medication is being filled for 1 time refill only due to:  Patient needs labs annual . Patient needs to be seen because it has been more than one year since last visit.    "

## 2022-02-09 DIAGNOSIS — H92.12 OTORRHEA, LEFT: ICD-10-CM

## 2022-02-09 RX ORDER — ACETIC ACID 20.65 MG/ML
SOLUTION AURICULAR (OTIC)
Qty: 15 ML | Refills: 11 | Status: SHIPPED | OUTPATIENT
Start: 2022-02-09 | End: 2023-05-04

## 2022-02-26 ENCOUNTER — HEALTH MAINTENANCE LETTER (OUTPATIENT)
Age: 52
End: 2022-02-26

## 2022-03-03 DIAGNOSIS — E78.5 HYPERLIPIDEMIA LDL GOAL <100: ICD-10-CM

## 2022-03-03 RX ORDER — SIMVASTATIN 20 MG
TABLET ORAL
Qty: 30 TABLET | Refills: 0 | Status: SHIPPED | OUTPATIENT
Start: 2022-03-03 | End: 2022-03-28

## 2022-03-03 NOTE — TELEPHONE ENCOUNTER
Routing refill request to provider for review/approval because:  Labs not current:  LDL  Appointment needed    LDL Cholesterol Calculated   Date Value Ref Range Status   01/05/2021 89 <100 mg/dL Final     Comment:     Desirable:       <100 mg/dl              Pending Prescriptions:                       Disp   Refills    simvastatin (ZOCOR) 20 MG tablet [Pharmacy*30 tab*0        Sig: TAKE 1 TABLET BY MOUTH EVERYDAY AT BEDTIME        Bravo Altamirano RN

## 2022-03-10 NOTE — DISCHARGE INSTRUCTIONS
----- Message from Laurel Jacob sent at 3/9/2022  3:30 PM CST -----  Hi,    Received a call from [facility] requesting a Same-Day or Next day appointment for the following patient. Patient needing to est care, follow up for Injury of head.    No appointments are populating. Patient willing to be seen at any location in the  Area. Can you please review and contact patient for scheduling?    Thank you,  Laurel Lai        1.  Dry ear precautions.    2.  After 1 week, may put head underwater without ear plugs if the water is chlorinated.  Otherwise, keep ears dry with ear plugs/headband.    3.  Floxin drops 5 drops twice a day to the left ear for 7 days.  Augmentin twice a day for 7 days, take with food.    4.  Tylenol as needed for pain.    5.  Follow up as scheduled.    6.  If you have any questions, please call the ENT clinic during daytime hours or call the  and ask for the ENT resident on call during evening/weekend hours.    [facility]  Home Care Following Anesthesia  For 24 hours after surgery:  1. Get plenty of rest.  A responsible adult must stay with you for at least 24 hours after you leave the surgery center.  2. Do not drive or use heavy equipment.  If you have weakness or tingling, don't drive or use heavy equipment until this feeling goes away.   3. Do not drink alcohol.   4. Avoid strenuous or risky activities.  Ask for help when climbing stairs.  5. You may feel lightheaded.  IF so, sit for a few minutes before standing.  Have someone help you get up.   6. If you have nausea (feel sick to your stomach): Drink only clear liquids such as apple juice, ginger ale, broth or 7-Up.  Rest may also help.  Be sure to drink enough fluids.  Move to a regular diet as you feel able.   7. You may have a slight fever.  Call the doctor if your fever is over 100 F (37.7 C) (taken under the tongue) or lasts longer than 24 hours.  8. You may have a dry mouth, a sore throat, muscle aches or trouble sleeping. These should go away after 24 hours.  9. Do not make important or legal decisions.               Tips for taking pain medications  To get the best pain relief possible, remember these points:    Take pain medications as directed, before pain becomes severe.    Pain medication can upset your stomach: taking it with food may help.    Constipation is a common side effect of pain medication.  Drink plenty of  fluids.    Eat foods high in fiber. Take a stool softener if recommended by your doctor or pharmacist.    Do not drink alcohol, drive or operate machinery while taking pain medications.    Ask about other ways to control pain, such as with heat, ice or relaxation.    Tylenol/Acetaminophen Consumption  To help encourage the safe use of acetaminophen, the makers of TYLENOL  have lowered the maximum daily dose for single-ingredient Extra Strength TYLENOL  (acetaminophen) products sold in the [place] from 8 pills per day (4,000 mg) to 6 pills per day (3,000 mg). The dosing interval has also changed from 2 pills every 4-6 hours to 2 pills every 6 hours.    If you feel your pain relief is insufficient, you may take Tylenol/Acetaminophen in addition to your narcotic pain medication.     Be careful not to exceed 3,000 mg of Tylenol/Acetaminophen in a 24 hour period from all sources.    If you are taking extra strength Tylenol/acetaminophen (500 mg), the maximum dose is 6 tablets in 24 hours.    If you are taking regular strength acetaminophen (325 mg), the maximum dose is 9 tablets in 24 hours.    Call a doctor for any of the followin. Signs of infection (fever, growing tenderness at the surgery site, a large amount of drainage or bleeding, severe pain, foul-smelling drainage, redness, swelling).  2. It has been over 8 to 10 hours since surgery and you are still not able to urinate (pass water).  3. Headache for over 24 hours.  Your doctor is:  Dr. Yaneth Rogel, ENT Otolaryngology: [phone]           Or dial [phone] and ask for the resident on call for:  ENT Otolaryngology  For emergency care, call the:  [facility]:  [phone] (TTY for hearing impaired: [phone])

## 2022-03-24 NOTE — RESULT ENCOUNTER NOTE
Recommended observation. Suhail,  These lab results continue to look good and/or stable from the past including normal lipid values, liver tests, electrolytes, PSA, blood counts, and urine test.  Your creatinine is stable from last year at 1.5.  Please continue your same medication.  I would advise another recheck in 1 year.    To Whitt MD

## 2022-03-27 DIAGNOSIS — E78.5 HYPERLIPIDEMIA LDL GOAL <100: ICD-10-CM

## 2022-03-28 RX ORDER — SIMVASTATIN 20 MG
TABLET ORAL
Qty: 30 TABLET | Refills: 0 | Status: SHIPPED | OUTPATIENT
Start: 2022-03-28 | End: 2022-04-25

## 2022-03-28 NOTE — TELEPHONE ENCOUNTER
"Routing refill request to provider for review/approval because:  Perla given x1 and patient did not follow up, please advise.     Requested Prescriptions   Pending Prescriptions Disp Refills    simvastatin (ZOCOR) 20 MG tablet [Pharmacy Med Name: SIMVASTATIN 20 MG TABLET] 30 tablet 0     Sig: TAKE 1 TABLET BY MOUTH EVERYDAY AT BEDTIME        Statins Protocol Failed - 3/28/2022  2:24 PM        Failed - LDL on file in past 12 months     Recent Labs   Lab Test 01/05/21  0748   LDL 89               Failed - Recent (12 mo) or future (30 days) visit within the authorizing provider's specialty     Patient has had an office visit with the authorizing provider or a provider within the authorizing providers department within the previous 12 mos or has a future within next 30 days. See \"Patient Info\" tab in inbasket, or \"Choose Columns\" in Meds & Orders section of the refill encounter.              Passed - No abnormal creatine kinase in past 12 months     No lab results found.             Passed - Medication is active on med list        Passed - Patient is age 18 or older              Brandi Tong RN   [facility]         "

## 2022-04-23 DIAGNOSIS — E78.5 HYPERLIPIDEMIA LDL GOAL <100: ICD-10-CM

## 2022-04-25 RX ORDER — SIMVASTATIN 20 MG
20 TABLET ORAL AT BEDTIME
Qty: 30 TABLET | Refills: 0 | Status: SHIPPED | OUTPATIENT
Start: 2022-04-25 | End: 2022-05-16

## 2022-04-25 NOTE — TELEPHONE ENCOUNTER
"Routing refill request to provider for review/approval because:  Perla given x1 and patient did not follow up, please advise  Labs not current:  LDL  [employer] message was sent to patient to schedule but message was not read      Requested Prescriptions   Pending Prescriptions Disp Refills     simvastatin (ZOCOR) 20 MG tablet [Pharmacy Med Name: SIMVASTATIN 20 MG TABLET] 30 tablet 0     Sig: TAKE 1 TABLET BY MOUTH EVERYDAY AT BEDTIME       Statins Protocol Failed - 4/23/2022  1:30 PM        Failed - LDL on file in past 12 months     Recent Labs   Lab Test 01/05/21  0748   LDL 89             Failed - Recent (12 mo) or future (30 days) visit within the authorizing provider's specialty     Patient has had an office visit with the authorizing provider or a provider within the authorizing providers department within the previous 12 mos or has a future within next 30 days. See \"Patient Info\" tab in inbasket, or \"Choose Columns\" in Meds & Orders section of the refill encounter.              Passed - No abnormal creatine kinase in past 12 months     No lab results found.             Passed - Medication is active on med list        Passed - Patient is age 18 or older           Suly Mason RN    "

## 2022-05-12 DIAGNOSIS — E78.5 HYPERLIPIDEMIA LDL GOAL <100: Primary | ICD-10-CM

## 2022-05-12 DIAGNOSIS — N18.31 STAGE 3A CHRONIC KIDNEY DISEASE (H): ICD-10-CM

## 2022-05-13 NOTE — TELEPHONE ENCOUNTER
"Requested Prescriptions   Pending Prescriptions Disp Refills     simvastatin (ZOCOR) 20 MG tablet 30 tablet 0     Sig: Take 1 tablet (20 mg) by mouth At Bedtime Due for recheck with MD.       Statins Protocol Failed - 5/12/2022  2:37 PM        Failed - LDL on file in past 12 months     Recent Labs   Lab Test 01/05/21  0748   LDL 89             Failed - Recent (12 mo) or future (30 days) visit within the authorizing provider's specialty     Patient has had an office visit with the authorizing provider or a provider within the authorizing providers department within the previous 12 mos or has a future within next 30 days. See \"Patient Info\" tab in inbasket, or \"Choose Columns\" in Meds & Orders section of the refill encounter.              Passed - No abnormal creatine kinase in past 12 months     No lab results found.           Passed - Medication is active on med list        Passed - Patient is age 18 or older             Team:  Please call patient and assist with scheduling an office visit with labs.  Limited refill was sent 4/25/22  [employer] message sent.      "

## 2022-05-16 ENCOUNTER — DOCUMENTATION ONLY (OUTPATIENT)
Dept: LAB | Facility: CLINIC | Age: 52
End: 2022-05-16
Payer: COMMERCIAL

## 2022-05-16 RX ORDER — SIMVASTATIN 20 MG
20 TABLET ORAL AT BEDTIME
Qty: 90 TABLET | Refills: 0 | Status: SHIPPED | OUTPATIENT
Start: 2022-05-16 | End: 2022-05-18

## 2022-05-16 NOTE — PROGRESS NOTES
Please review, associate diagnosis and sign laboratory orders for patient upcoming appointment on 5/17/2022. Thank you, [facility]

## 2022-05-16 NOTE — TELEPHONE ENCOUNTER
Appointment has been made for 05/18/2022. Patient is requesting fasting labs before the physical. Patient has lab appointment made for tomorrow at 10 am. Routing to PCP to advise.     Jennifer PRASAD,

## 2022-05-17 ENCOUNTER — LAB (OUTPATIENT)
Dept: LAB | Facility: CLINIC | Age: 52
End: 2022-05-17
Payer: COMMERCIAL

## 2022-05-17 DIAGNOSIS — E78.5 HYPERLIPIDEMIA LDL GOAL <100: ICD-10-CM

## 2022-05-17 DIAGNOSIS — N18.31 STAGE 3A CHRONIC KIDNEY DISEASE (H): ICD-10-CM

## 2022-05-17 LAB
ANION GAP SERPL CALCULATED.3IONS-SCNC: 5 MMOL/L (ref 3–14)
BUN SERPL-MCNC: 11 MG/DL (ref 7–30)
CALCIUM SERPL-MCNC: 9.1 MG/DL (ref 8.5–10.1)
CHLORIDE BLD-SCNC: 105 MMOL/L (ref 94–109)
CHOLEST SERPL-MCNC: 143 MG/DL
CO2 SERPL-SCNC: 28 MMOL/L (ref 20–32)
CREAT SERPL-MCNC: 1.42 MG/DL (ref 0.66–1.25)
CREAT UR-MCNC: 104 MG/DL
ERYTHROCYTE [DISTWIDTH] IN BLOOD BY AUTOMATED COUNT: 11.8 % (ref 10–15)
FASTING STATUS PATIENT QL REPORTED: ABNORMAL
GFR SERPL CREATININE-BSD FRML MDRD: 59 ML/MIN/1.73M2
GLUCOSE BLD-MCNC: 97 MG/DL (ref 70–99)
HCT VFR BLD AUTO: 42.7 % (ref 40–53)
HDLC SERPL-MCNC: 39 MG/DL
HGB BLD-MCNC: 14.9 G/DL (ref 13.3–17.7)
LDLC SERPL CALC-MCNC: 73 MG/DL
MCH RBC QN AUTO: 32.9 PG (ref 26.5–33)
MCHC RBC AUTO-ENTMCNC: 34.9 G/DL (ref 31.5–36.5)
MCV RBC AUTO: 94 FL (ref 78–100)
MICROALBUMIN UR-MCNC: 6 MG/L
MICROALBUMIN/CREAT UR: 5.77 MG/G CR (ref 0–17)
NONHDLC SERPL-MCNC: 104 MG/DL
PLATELET # BLD AUTO: 216 10E3/UL (ref 150–450)
POTASSIUM BLD-SCNC: 4.2 MMOL/L (ref 3.4–5.3)
RBC # BLD AUTO: 4.53 10E6/UL (ref 4.4–5.9)
SODIUM SERPL-SCNC: 138 MMOL/L (ref 133–144)
TRIGL SERPL-MCNC: 154 MG/DL
WBC # BLD AUTO: 5.6 10E3/UL (ref 4–11)

## 2022-05-17 PROCEDURE — 82043 UR ALBUMIN QUANTITATIVE: CPT

## 2022-05-17 PROCEDURE — 85027 COMPLETE CBC AUTOMATED: CPT

## 2022-05-17 PROCEDURE — 36415 COLL VENOUS BLD VENIPUNCTURE: CPT

## 2022-05-17 PROCEDURE — 80048 BASIC METABOLIC PNL TOTAL CA: CPT

## 2022-05-17 PROCEDURE — 80061 LIPID PANEL: CPT

## 2022-05-18 ENCOUNTER — OFFICE VISIT (OUTPATIENT)
Dept: FAMILY MEDICINE | Facility: CLINIC | Age: 52
End: 2022-05-18
Payer: COMMERCIAL

## 2022-05-18 VITALS
WEIGHT: 221 LBS | TEMPERATURE: 98.5 F | DIASTOLIC BLOOD PRESSURE: 88 MMHG | BODY MASS INDEX: 30.82 KG/M2 | OXYGEN SATURATION: 100 % | SYSTOLIC BLOOD PRESSURE: 118 MMHG | HEART RATE: 75 BPM

## 2022-05-18 DIAGNOSIS — H90.12 CONDUCTIVE HEARING LOSS OF LEFT EAR WITH UNRESTRICTED HEARING OF RIGHT EAR: ICD-10-CM

## 2022-05-18 DIAGNOSIS — E66.3 OVERWEIGHT: ICD-10-CM

## 2022-05-18 DIAGNOSIS — Z23 NEED FOR SHINGLES VACCINE: ICD-10-CM

## 2022-05-18 DIAGNOSIS — Z00.00 ROUTINE GENERAL MEDICAL EXAMINATION AT A HEALTH CARE FACILITY: Primary | ICD-10-CM

## 2022-05-18 DIAGNOSIS — N18.31 STAGE 3A CHRONIC KIDNEY DISEASE (H): ICD-10-CM

## 2022-05-18 DIAGNOSIS — E78.5 HYPERLIPIDEMIA LDL GOAL <100: ICD-10-CM

## 2022-05-18 PROBLEM — N18.30 CKD (CHRONIC KIDNEY DISEASE) STAGE 3, GFR 30-59 ML/MIN (H): Status: RESOLVED | Noted: 2018-04-25 | Resolved: 2022-05-18

## 2022-05-18 PROCEDURE — 90750 HZV VACC RECOMBINANT IM: CPT | Performed by: FAMILY MEDICINE

## 2022-05-18 PROCEDURE — 99213 OFFICE O/P EST LOW 20 MIN: CPT | Mod: 25 | Performed by: FAMILY MEDICINE

## 2022-05-18 PROCEDURE — 90471 IMMUNIZATION ADMIN: CPT | Performed by: FAMILY MEDICINE

## 2022-05-18 PROCEDURE — 99396 PREV VISIT EST AGE 40-64: CPT | Mod: 25 | Performed by: FAMILY MEDICINE

## 2022-05-18 RX ORDER — SIMVASTATIN 20 MG
20 TABLET ORAL AT BEDTIME
Qty: 90 TABLET | Refills: 3 | Status: SHIPPED | OUTPATIENT
Start: 2022-05-18 | End: 2023-06-12

## 2022-05-18 ASSESSMENT — ENCOUNTER SYMPTOMS
HEARTBURN: 0
MYALGIAS: 0
FREQUENCY: 0
FEVER: 0
PARESTHESIAS: 0
ABDOMINAL PAIN: 0
JOINT SWELLING: 0
SORE THROAT: 0
HEADACHES: 1
ARTHRALGIAS: 0
DYSURIA: 0
DIARRHEA: 0
PALPITATIONS: 0
NAUSEA: 0
HEMATURIA: 0
WEAKNESS: 0
CONSTIPATION: 0
COUGH: 0
EYE PAIN: 0
NERVOUS/ANXIOUS: 0
SHORTNESS OF BREATH: 0
DIZZINESS: 0
HEMATOCHEZIA: 0
CHILLS: 0

## 2022-05-18 NOTE — PROGRESS NOTES
SUBJECTIVE:   CC: Suhail Mustafa is an 51 year old male who presents for a preventative health visit and follow-up on baseline health conditions.       Patient has been advised of split billing requirements and indicates understanding: Yes  Healthy Habits:     Getting at least 3 servings of Calcium per day:  Yes    Bi-annual eye exam:  Yes    Dental care twice a year:  Yes    Sleep apnea or symptoms of sleep apnea:  None    Diet:  Regular (no restrictions)    Frequency of exercise:  1 day/week    Duration of exercise:  30-45 minutes    Taking medications regularly:  Yes    Medication side effects:  None    PHQ-2 Total Score: 0    Additional concerns today:  Yes      [employer] form.    He is planning to a [employer] camp in [place] this summer and has a form that he needs filled out for that.  He has done this in the past.  He remains on simvastatin for hyperlipidemia.  He tolerates the medicine well.  He came in recently for fasting lab work.  He has donated a kidney in the past and does have elevated creatinine levels with his remaining kidney.  He donates blood about every 10 weeks.  He has had some chronic left ear problems and sees ENT for that.  See notes in chart for details.    Today's PHQ-2 Score:   PHQ-2 ( 1999 [employer]) 5/18/2022   Q1: Little interest or pleasure in doing things 0   Q2: Feeling down, depressed or hopeless 0   PHQ-2 Score 0   PHQ-2 Total Score (12-17 Years)- Positive if 3 or more points; Administer PHQ-A if positive -   Q1: Little interest or pleasure in doing things Not at all   Q2: Feeling down, depressed or hopeless Not at all   PHQ-2 Score 0       Abuse: Current or Past(Physical, Sexual or Emotional)- No  Do you feel safe in your environment? Yes    Have you ever done Advance Care Planning? (For example, a Health Directive, POLST, or a discussion with a medical provider or your loved ones about your wishes): Yes, advance care planning is on file.    Social History     Tobacco Use      Smoking status: Never Smoker     Smokeless tobacco: Never Used   Substance Use Topics     Alcohol use: Yes     Alcohol/week: 1.0 standard drink     Comment: once per week with a meal     If you drink alcohol do you typically have >3 drinks per day or >7 drinks per week? No    Alcohol Use 5/18/2022   Prescreen: >3 drinks/day or >7 drinks/week? No   Prescreen: >3 drinks/day or >7 drinks/week? -       Last PSA:   PSA   Date Value Ref Range Status   01/05/2021 1.24 0 - 4 ug/L Final     Comment:     Assay Method:  Chemiluminescence using Siemens Vista analyzer       Reviewed orders with patient. Reviewed health maintenance and updated orders accordingly - Yes  Patient Active Problem List   Diagnosis     FH: kidney cancer     Conductive hearing loss in left ear     Donor of kidney for transplant     Lichen simplex chronicus     ACP (advance care planning)     Hyperlipidemia LDL goal <100     Overweight (BMI 25.0-29.9)     Stage 3a chronic kidney disease (H)     Labral tear of shoulder, left, subsequent encounter     Shoulder instability, left     Hx of adenomatous polyp of colon     Past Surgical History:   Procedure Laterality Date     ARTHROSCOPY SHOULDER, OPEN BICEP TENODESIS REPAIR, COMBINED Left 11/16/2018    Procedure: Left Shoulder Diagnostic Arthroscopy, Labral Debridement, Open Biceps Tenodesis;  Surgeon: Anai Mata MD;  Location: [facility]     ENT SURGERY  multiple times    pe tubes     ENT SURGERY      tonsils     ENT SURGERY  11/2011    Tubes 5 times since last time 10/21/14     GENITOURINARY SURGERY  Oct. 2013    Vasectomy     LAPAROSCOPIC DONOR HAND ASSISTED KIDNEY LIVING RELATED N/A 10/13/2016    Procedure: LAPAROSCOPIC DONOR HAND ASSISTED KIDNEY LIVING RELATED;  Surgeon: Tigre Quarles MD;  Location: [facility] OR     LASER DIODE TYMPANOMASTOIDECTOMY Left 3/31/2015    Procedure: LASER DIODE TYMPANOMASTOIDECTOMY;  Surgeon: Yaneth Rogel MD;  Location: [facility] OR     LASER DIODE TYMPANOTOMY Left  6/12/2019    Procedure: Laser Myringotomy with Diode Laser and T-tube Placement;  Surgeon: Yaneth Rogel MD;  Location:  OR     ORTHOPEDIC SURGERY  child    dislocated hip     OSSICULAR CHAIN RECONSTRUCTION N/A 10/9/2015    Procedure: OSSICULAR CHAIN RECONSTRUCTION;  Surgeon: Yaneth Rogel MD;  Location: [facility] OR     TYMPANOPLASTY Left 10/9/2015    Procedure: TYMPANOPLASTY;  Surgeon: Yaneth Rogel MD;  [facility]       Social History     Tobacco Use     Smoking status: Never Smoker     Smokeless tobacco: Never Used   Substance Use Topics     Alcohol use: Yes     Alcohol/week: 1.0 standard drink     Comment: once per week with a meal     Family History   Problem Relation Age of Onset     Gastrointestinal Disease Mother         colostomy for diverticulitis     Cerebrovascular Disease Mother         after giving birth     Kidney Disease Brother         dysplasia     Genitourinary Problems Brother      Cardiovascular Brother         ablation     Hypertension Brother      Heart Disease Father      Genitourinary Problems Father      Diabetes Type 2  Father         obese     Cancer Paternal Grandfather         bone cancer     Aneurysm Paternal Uncle         brain aneurysm on coumadin     Hypertension Brother          Current Outpatient Medications   Medication Sig Dispense Refill     acetic acid (VOSOL) 2 % otic solution INSTILL 4 DROPS TO THE LEFT EAR- DAILY OR EVERY OTHER DAY 15 mL 11     simvastatin (ZOCOR) 20 MG tablet Take 1 tablet (20 mg) by mouth At Bedtime 90 tablet 3     triamcinolone (KENALOG) 0.1 % external ointment Apply sparingly to affected area three times a day 80 g 2     VITAMIN D, CHOLECALCIFEROL, PO Take 1,000 Units by mouth daily  (Patient not taking: No sig reported)       Allergies   Allergen Reactions     Seasonal Allergies        Reviewed and updated as needed this visit by clinical staff                    Reviewed and updated as needed this visit by Provider                       Review of  Systems   Constitutional: Negative for chills and fever.   HENT: Positive for ear pain and hearing loss. Negative for congestion and sore throat.    Eyes: Negative for pain and visual disturbance.   Respiratory: Negative for cough and shortness of breath.    Cardiovascular: Negative for chest pain, palpitations and peripheral edema.   Gastrointestinal: Negative for abdominal pain, constipation, diarrhea, heartburn, hematochezia and nausea.   Genitourinary: Negative for dysuria, frequency, genital sores, hematuria, impotence, penile discharge and urgency.   Musculoskeletal: Negative for arthralgias, joint swelling and myalgias.   Skin: Negative for rash.   Neurological: Positive for headaches. Negative for dizziness, weakness and paresthesias.   Psychiatric/Behavioral: Negative for mood changes. The patient is not nervous/anxious.          OBJECTIVE:   /88 (BP Location: Right arm, Patient Position: Sitting, Cuff Size: Adult Regular)   Pulse 75   Temp 98.5  F (36.9  C) (Oral)   Wt 100.2 kg (221 lb)   SpO2 100%   BMI 30.82 kg/m      Physical Exam  GENERAL: alert, no distress and over weight  EYES: Eyes grossly normal to inspection, PERRL and conjunctivae and sclerae normal  HENT: He has a left hearing aid, but otherwise unremarkable  NECK: no adenopathy, no asymmetry, masses, or scars and thyroid normal to palpation  RESP: lungs clear to auscultation - no rales, rhonchi or wheezes  CV: regular rate and rhythm, normal S1 S2, no S3 or S4, no murmur, click or rub, no peripheral edema and peripheral pulses strong  ABDOMEN: soft, nontender, no hepatosplenomegaly, no masses   MS: no gross musculoskeletal defects noted, no edema  SKIN: no suspicious lesions or rashes  NEURO: Normal strength and tone, mentation intact and speech normal  PSYCH: mentation appears normal, affect normal/bright    Diagnostic Test Results:  Labs reviewed in Epic  Lab on 05/17/2022   Component Date Value Ref Range Status     Sodium  05/17/2022 138  133 - 144 mmol/L Final     Potassium 05/17/2022 4.2  3.4 - 5.3 mmol/L Final     Chloride 05/17/2022 105  94 - 109 mmol/L Final     Carbon Dioxide (CO2) 05/17/2022 28  20 - 32 mmol/L Final     Anion Gap 05/17/2022 5  3 - 14 mmol/L Final     Urea Nitrogen 05/17/2022 11  7 - 30 mg/dL Final     Creatinine 05/17/2022 1.42 (A) 0.66 - 1.25 mg/dL Final     Calcium 05/17/2022 9.1  8.5 - 10.1 mg/dL Final     Glucose 05/17/2022 97  70 - 99 mg/dL Final     GFR Estimate 05/17/2022 59 (A) >60 mL/min/1.73m2 Final    Effective December 21, 2021 eGFRcr in adults is calculated using the 2021 CKD-EPI creatinine equation which includes age and gender (Jin et al., [facility], DOI: 10.1056/XEJBzg3907255)     Creatinine Urine mg/dL 05/17/2022 104  mg/dL Final     Albumin Urine mg/L 05/17/2022 6  mg/L Final     Albumin Urine mg/g Cr 05/17/2022 5.77  0.00 - 17.00 mg/g Cr Final     Cholesterol 05/17/2022 143  <200 mg/dL Final     Triglycerides 05/17/2022 154 (A) <150 mg/dL Final     Direct Measure HDL 05/17/2022 39 (A) >=40 mg/dL Final     LDL Cholesterol Calculated 05/17/2022 73  <=100 mg/dL Final     Non HDL Cholesterol 05/17/2022 104  <130 mg/dL Final     Patient Fasting > 8hrs? 05/17/2022 Unknown   Final     WBC Count 05/17/2022 5.6  4.0 - 11.0 10e3/uL Final     RBC Count 05/17/2022 4.53  4.40 - 5.90 10e6/uL Final     Hemoglobin 05/17/2022 14.9  13.3 - 17.7 g/dL Final     Hematocrit 05/17/2022 42.7  40.0 - 53.0 % Final     MCV 05/17/2022 94  78 - 100 fL Final     MCH 05/17/2022 32.9  26.5 - 33.0 pg Final     MCHC 05/17/2022 34.9  31.5 - 36.5 g/dL Final     RDW 05/17/2022 11.8  10.0 - 15.0 % Final     Platelet Count 05/17/2022 216  150 - 450 10e3/uL Final         ASSESSMENT/PLAN:       ICD-10-CM    1. Routine general medical examination at a health care facility  Z00.00    2. Hyperlipidemia LDL goal <100  E78.5 simvastatin (ZOCOR) 20 MG tablet   3. Stage 3a chronic kidney disease (H)  N18.31    4. Conductive hearing loss of  "left ear with unrestricted hearing of right ear  H90.12    5. Overweight  E66.3    6. Need for shingles vaccine  Z23 SCREENING QUESTIONS FOR ADULT IMMUNIZATIONS     ZOSTER VACCINE RECOMBINANT ADJUVANTED IM NJX     Lab work looks stable from the past  We will continue his same baseline meds  We discussed the above items  We will give him the shingles vaccine today  He was advised to return to a local pharmacy in 2 to 6 months for a booster dose  His Boy  physical form was completed for him today  No restrictions for that  Continue ENT follow-up as per their recommendation  Plan a recheck in 1 year, or sooner prn      COUNSELING:   Reviewed preventive health counseling, as reflected in patient instructions       Regular exercise       Healthy diet/nutrition       Immunizations    Vaccinated for: Zoster          Estimated body mass index is 30.82 kg/m  as calculated from the following:    Height as of 1/24/22: 1.803 m (5' 11\").    Weight as of this encounter: 100.2 kg (221 lb).     Weight management plan: Discussed healthy diet and exercise guidelines    He reports that he has never smoked. He has never used smokeless tobacco.      Counseling Resources:  ATP IV Guidelines  Pooled Cohorts Equation Calculator  FRAX Risk Assessment  ICSI Preventive Guidelines  Dietary Guidelines for Americans, 2010  USDA's MyPlate  ASA Prophylaxis  Lung CA Screening    To Whitt MD  [employer]  "

## 2022-07-12 ENCOUNTER — MYC MEDICAL ADVICE (OUTPATIENT)
Dept: FAMILY MEDICINE | Facility: CLINIC | Age: 52
End: 2022-07-12

## 2022-07-12 NOTE — TELEPHONE ENCOUNTER
Please have patient schedule an in person appointment 11 days after positive test.   Margareth Gomes PA-C

## 2022-07-20 ENCOUNTER — OFFICE VISIT (OUTPATIENT)
Dept: FAMILY MEDICINE | Facility: CLINIC | Age: 52
End: 2022-07-20
Payer: COMMERCIAL

## 2022-07-20 VITALS
BODY MASS INDEX: 30.27 KG/M2 | OXYGEN SATURATION: 100 % | DIASTOLIC BLOOD PRESSURE: 80 MMHG | WEIGHT: 217 LBS | TEMPERATURE: 98.8 F | HEART RATE: 77 BPM | SYSTOLIC BLOOD PRESSURE: 116 MMHG

## 2022-07-20 DIAGNOSIS — Z86.16 HISTORY OF 2019 NOVEL CORONAVIRUS DISEASE (COVID-19): Primary | ICD-10-CM

## 2022-07-20 PROCEDURE — 99213 OFFICE O/P EST LOW 20 MIN: CPT | Performed by: FAMILY MEDICINE

## 2022-07-20 ASSESSMENT — PAIN SCALES - GENERAL: PAINLEVEL: NO PAIN (0)

## 2022-07-20 ASSESSMENT — ENCOUNTER SYMPTOMS: COUGH: 1

## 2022-07-20 NOTE — PROGRESS NOTES
"  Assessment & Plan       ICD-10-CM    1. History of 2019 novel coronavirus disease (COVID-19)  Z86.16      His vital signs are all nice and healthy and his lungs are clear  His symptoms are significantly improving now as well  He was therefore reassured about his recent COVID infection and we will continue to follow him expectantly    Return for a follow up as needed/desired.    To Whitt MD  [facility] NOELLE Jang is a 51 year old, presenting for the following health issues:  Cough (Pos for covid on 701/22 while in [place], tested neg 7/9/22)      Cough         Pos for covid on 701/22 while in [place], tested neg 7/9/22. Patient has a lingering wet cough, clear mucous.    The patient was in the middle of a 2-week family vacation trip to [place] when he tested positive for COVID a few weeks ago.  He mainly just had \"allergy symptoms\".  Eventually, 4 of the 6 members of his family tested positive.  He never felt like he got very sick with the infection, but he had a lingering cough that was bothersome.  Today is the first day he actually has not used any cough drops.  He feels like his cough has been getting better since he made this appointment about 10 days ago.  He has had no recent fever.  He has not checked his oxygen level, but he feels like he is breathing okay.  His past medical history is mainly significant for being a kidney donor to his brother who had kidney cancer.    Patient Active Problem List   Diagnosis     FH: kidney cancer     Conductive hearing loss in left ear     Donor of kidney for transplant     Lichen simplex chronicus     ACP (advance care planning)     Hyperlipidemia LDL goal <100     Overweight (BMI 25.0-29.9)     Stage 3a chronic kidney disease (H)     Labral tear of shoulder, left, subsequent encounter     Shoulder instability, left     Hx of adenomatous polyp of colon     Current Outpatient Medications   Medication     acetic acid (VOSOL) 2 % otic " solution     simvastatin (ZOCOR) 20 MG tablet     triamcinolone (KENALOG) 0.1 % external ointment     VITAMIN D, CHOLECALCIFEROL, PO     No current facility-administered medications for this visit.         Review of Systems   Respiratory: Positive for cough.             Objective    /80 (BP Location: Left arm, Patient Position: Sitting, Cuff Size: Adult Regular)   Pulse 77   Temp 98.8  F (37.1  C) (Oral)   Wt 98.4 kg (217 lb)   SpO2 100%   BMI 30.27 kg/m    Body mass index is 30.27 kg/m .  Physical Exam   GENERAL: healthy, alert and no distress  RESP: lungs clear to auscultation - no rales, rhonchi or wheezes                .  ..

## 2022-08-02 NOTE — PROGRESS NOTES
Otolaryngology Clinic      Name: Suhail Mustafa  MRN: [id number]  Age: 51 year old  : 2022      Chief Complaint:   Follow up    History of Present Illness:   Suhail Mustafa is a 51 year old male who presents for follow up.At his appointment on 22 he was continuing Vosol drops 1x/week but did need to increase it to 3-4x/day due to ongoing drainage. His left t-tube was in place at that appointment. He did have a brief episode of plugging sensation of his tube for 2 weeks and an overall soreness. He thinks that may have happened after he returned from [place] just under a month ago. He had tried to apply drops daily to the ear and finally had a breakthrough in his plugging 3 nights ago, now he just has some soreness. Otherwise he is doing well.      Physical Exam:   /85 (BP Location: Right arm, Patient Position: Chair, Cuff Size: Adult Large)   Pulse 88   Wt 98.2 kg (216 lb 9 oz)   BMI 30.20 kg/m       Male in no acute distress.  Alert and answering questions appropriately.  HB 1/6 bilaterally.  Both ears examined under the microscope. Left T-tube in place anteriorly and patent. Chronic minute amount of wet debris collecting in the medial canal which was suctioned. Middle ear is aerated. Right canal is clear. TM is without effusion or retraction. Middle ear well aerated.      Assessment and Plan:  Suhail Mustafa is a 51 year old male who presents for follow up. His ears are stable today and I cleaned a minimal amount of fluid on the left. He can return in 6 months for follow-up and will use Vosol as needed.     Scribe Disclosure:  I, Raul Clayton, am serving as a scribe to document services personally performed by Yaneth Rogel MD at this visit, based upon the provider's statements to me. All documentation has been reviewed by the aforementioned provider prior to being entered into the official medical record.     The documentation recorded by the scribe accurately reflects  the services I personally performed and the decisions made by me.

## 2022-08-03 ENCOUNTER — OFFICE VISIT (OUTPATIENT)
Dept: OTOLARYNGOLOGY | Facility: CLINIC | Age: 52
End: 2022-08-03
Payer: COMMERCIAL

## 2022-08-03 VITALS
SYSTOLIC BLOOD PRESSURE: 128 MMHG | WEIGHT: 216.56 LBS | DIASTOLIC BLOOD PRESSURE: 85 MMHG | HEART RATE: 88 BPM | BODY MASS INDEX: 30.2 KG/M2

## 2022-08-03 DIAGNOSIS — H92.12 OTORRHEA, LEFT: ICD-10-CM

## 2022-08-03 DIAGNOSIS — H69.92 DYSFUNCTION OF LEFT EUSTACHIAN TUBE: Primary | ICD-10-CM

## 2022-08-03 PROCEDURE — 99212 OFFICE O/P EST SF 10 MIN: CPT | Mod: 25 | Performed by: OTOLARYNGOLOGY

## 2022-08-03 PROCEDURE — 92504 EAR MICROSCOPY EXAMINATION: CPT | Performed by: OTOLARYNGOLOGY

## 2022-08-03 ASSESSMENT — PAIN SCALES - GENERAL: PAINLEVEL: MILD PAIN (3)

## 2022-08-03 NOTE — PROGRESS NOTES
Chief Complaint   Patient presents with     RECHECK     Blood pressure 128/85, pulse 88, weight 98.2 kg (216 lb 9 oz).    Bettie Forbes LPN

## 2022-08-03 NOTE — LETTER
8/3/2022      RE: Suhail Mustafa  [address]   [facility] [address]         Otolaryngology Clinic      Name: Suhail Mustafa  MRN: [id number]  Age: 51 year old  : [birth year]22      Chief Complaint:   Follow up    History of Present Illness:   Suhail Mustafa is a 51 year old male who presents for follow up.At his appointment on 22 he was continuing Vosol drops 1x/week but did need to increase it to 3-4x/day due to ongoing drainage. His left t-tube was in place at that appointment. He did have a brief episode of plugging sensation of his tube for 2 weeks and an overall soreness. He thinks that may have happened after he returned from [place] just under a month ago. He had tried to apply drops daily to the ear and finally had a breakthrough in his plugging 3 nights ago, now he just has some soreness. Otherwise he is doing well.      Physical Exam:   /85 (BP Location: Right arm, Patient Position: Chair, Cuff Size: Adult Large)   Pulse 88   Wt 98.2 kg (216 lb 9 oz)   BMI 30.20 kg/m       Male in no acute distress.  Alert and answering questions appropriately.  HB 1/6 bilaterally.  Both ears examined under the microscope. Left T-tube in place anteriorly and patent. Chronic minute amount of wet debris collecting in the medial canal which was suctioned. Middle ear is aerated. Right canal is clear. TM is without effusion or retraction. Middle ear well aerated.      Assessment and Plan:  Suhail Mustafa is a 51 year old male who presents for follow up. His ears are stable today and I cleaned a minimal amount of fluid on the left. He can return in 6 months for follow-up and will use Vosol as needed.     Scribe Disclosure:  I, Raul Clayton, am serving as a scribe to document services personally performed by Yaneth Rogel MD at this visit, based upon the provider's statements to me. All documentation has been reviewed by the aforementioned provider prior to being entered into the official  medical record.     The documentation recorded by the scribe accurately reflects the services I personally performed and the decisions made by me.      Chief Complaint   Patient presents with     RECHECK     Blood pressure 128/85, pulse 88, weight 98.2 kg (216 lb 9 oz).    KOJO Lu MD

## 2022-08-03 NOTE — PATIENT INSTRUCTIONS
1. You were seen in the ENT Clinic today by Dr. Rogel.  If you have any questions or concerns after your appointment, please call   - Option 1: ENT Clinic: [phone]   - Option 2: Bettie (Dr. Rogel's Nurse): [phone]                   Jacquelyn(Dr. Rogel's Nurse): [phone]    2.   Plan to return to clinic in 6 months    Bettie Forbes LPN  [facility] - Otolaryngology

## 2022-08-03 NOTE — Clinical Note
8/3/2022       RE: Suhail Mustafa  [address]   [facility] [address]     Dear Colleague,    Thank you for referring your patient, Suhail Mustafa, to the [facility] [place] [facility]. Please see a copy of my visit note below.      Otolaryngology Clinic      Name: Suhail Mustafa  MRN: [id number]  Age: 51 year old  : 2024      Chief Complaint:   Follow up    History of Present Illness:   Suhail Mustafa is a 51 year old male who presents for follow up.At his appointment on 22 he was continuing Vosol drops 1x/week but did need to increase it to 3-4x/day due to ongoing drainage. His left t-tube was in place at that appointment. He did have a brief episode of plugging sensation of his tube for 2 week and an overall soreness. He thinks that may have happened after he returned from [place] just under a month ago. He had tried to apply drops daily to the ear and finally had a breakthrough in his plugging 3 nights ago, now he just has some soreness. Otherwise he is doing well.        Physical Exam:   /85 (BP Location: Right arm, Patient Position: Chair, Cuff Size: Adult Large)   Pulse 88   Wt 98.2 kg (216 lb 9 oz)   BMI 30.20 kg/m       Physical examination:  Male in no acute distress.  Alert and answering questions appropriately.  HB 1/6 bilaterally.  Both ears examined under the microscope. Left T-tube in place anteriorly and patent. Chronic minute amount of wet debris collecting in the medial canal which was suctioned. Middle ear is aerated. Right canal is clear. TM is without effusion or retraction. Middle ear well aerated.      Assessment and Plan:  Suhail Mustafa is a 51 year old male who presents for follow up. His ears are stable today and I performed suctioning in the left. He can return in 6 months for follow-up.         Scribe Disclosure:  I, Raul Clayton, am serving as a scribe  to document services personally performed by Yaneth Rogel MD at this visit, based upon the provider's statements to me. All documentation has been reviewed by the aforementioned provider prior to being entered into the official medical record.     Chief Complaint   Patient presents with     RECHECK     Blood pressure 128/85, pulse 88, weight 98.2 kg (216 lb 9 oz).    Bettie Forbes LPN        Again, thank you for allowing me to participate in the care of your patient.      Sincerely,    Yaneth Rogel MD

## 2022-10-10 ENCOUNTER — E-VISIT (OUTPATIENT)
Dept: URGENT CARE | Facility: CLINIC | Age: 52
End: 2022-10-10
Payer: COMMERCIAL

## 2022-10-10 DIAGNOSIS — R05.9 COUGH, UNSPECIFIED TYPE: Primary | ICD-10-CM

## 2022-10-10 PROCEDURE — 99207 PR NON-BILLABLE SERV PER CHARTING: CPT | Performed by: FAMILY MEDICINE

## 2022-10-10 NOTE — PATIENT INSTRUCTIONS
Dear Suhail Mustafa,    We are sorry you are not feeling well. Based on the responses you provided, it is recommended that you be seen in-person in urgent care so we can better evaluate your symptoms. Please click here to find the nearest urgent care location to you.   You will not be charged for this Visit. Thank you for trusting us with your care.    Patricia Frost MD

## 2022-10-11 ENCOUNTER — ANCILLARY PROCEDURE (OUTPATIENT)
Dept: GENERAL RADIOLOGY | Facility: CLINIC | Age: 52
End: 2022-10-11
Attending: INTERNAL MEDICINE
Payer: COMMERCIAL

## 2022-10-11 ENCOUNTER — OFFICE VISIT (OUTPATIENT)
Dept: INTERNAL MEDICINE | Facility: CLINIC | Age: 52
End: 2022-10-11
Payer: COMMERCIAL

## 2022-10-11 VITALS
SYSTOLIC BLOOD PRESSURE: 121 MMHG | WEIGHT: 213.9 LBS | DIASTOLIC BLOOD PRESSURE: 88 MMHG | HEART RATE: 60 BPM | BODY MASS INDEX: 29.83 KG/M2 | TEMPERATURE: 97.9 F | OXYGEN SATURATION: 100 %

## 2022-10-11 DIAGNOSIS — Z96.22 CHRONIC OTITIS MEDIA OF LEFT EAR AFTER INSERTION OF TYMPANIC VENTILATION TUBE: ICD-10-CM

## 2022-10-11 DIAGNOSIS — H66.92 CHRONIC OTITIS MEDIA OF LEFT EAR AFTER INSERTION OF TYMPANIC VENTILATION TUBE: ICD-10-CM

## 2022-10-11 DIAGNOSIS — R05.3 CHRONIC COUGH: ICD-10-CM

## 2022-10-11 DIAGNOSIS — J02.9 SORE THROAT: ICD-10-CM

## 2022-10-11 DIAGNOSIS — R05.3 CHRONIC COUGH: Primary | ICD-10-CM

## 2022-10-11 DIAGNOSIS — R09.82 POSTNASAL DRIP: ICD-10-CM

## 2022-10-11 PROCEDURE — 99204 OFFICE O/P NEW MOD 45 MIN: CPT | Performed by: INTERNAL MEDICINE

## 2022-10-11 PROCEDURE — 71046 X-RAY EXAM CHEST 2 VIEWS: CPT | Mod: TC | Performed by: RADIOLOGY

## 2022-10-11 RX ORDER — BENZONATATE 200 MG/1
200 CAPSULE ORAL 3 TIMES DAILY PRN
Qty: 30 CAPSULE | Refills: 0 | Status: SHIPPED | OUTPATIENT
Start: 2022-10-11 | End: 2023-05-04

## 2022-10-11 RX ORDER — AZITHROMYCIN 250 MG/1
TABLET, FILM COATED ORAL
Qty: 6 TABLET | Refills: 0 | Status: SHIPPED | OUTPATIENT
Start: 2022-10-11 | End: 2022-10-16

## 2022-10-11 RX ORDER — FLUTICASONE PROPIONATE 50 MCG
1 SPRAY, SUSPENSION (ML) NASAL DAILY
Qty: 16 G | Refills: 1 | Status: SHIPPED | OUTPATIENT
Start: 2022-10-11 | End: 2022-12-04

## 2022-10-11 ASSESSMENT — ENCOUNTER SYMPTOMS: COUGH: 1

## 2022-10-11 NOTE — PROGRESS NOTES
Assessment & Plan     53 yo male with h/o Covid and cough in July this year, recovered completely, but the cough came back about 2 weeks ago and has sore throat and postnasal drainage too. No sinus congestion, fever, chills, GERD. No wheezing, dyspnea.  He has left ear tube and occasionally has mild drainage. He is using ear drops. Has fullness in the ears.  His son has same cold symptoms.  He denies smoking and history of asthma.  He donated one of his kidneys to his brother.  Component      Latest Ref Rng & Units 5/17/2022   Sodium      133 - 144 mmol/L 138   Potassium      3.4 - 5.3 mmol/L 4.2   Chloride      94 - 109 mmol/L 105   Carbon Dioxide      20 - 32 mmol/L 28   Anion Gap      3 - 14 mmol/L 5   Urea Nitrogen      7 - 30 mg/dL 11   Creatinine      0.66 - 1.25 mg/dL 1.42 (H)   Calcium      8.5 - 10.1 mg/dL 9.1   Glucose      70 - 99 mg/dL 97   GFR Estimate      >60 mL/min/1.73m2 59 (L)       Chronic cough    - XR Chest 2 Views; Future  - azithromycin (ZITHROMAX) 250 MG tablet; Take 2 tablets (500 mg) by mouth daily for 1 day, THEN 1 tablet (250 mg) daily for 4 days.  - benzonatate (TESSALON) 200 MG capsule; Take 1 capsule (200 mg) by mouth 3 times daily as needed for cough    Sore throat    - azithromycin (ZITHROMAX) 250 MG tablet; Take 2 tablets (500 mg) by mouth daily for 1 day, THEN 1 tablet (250 mg) daily for 4 days.    Chronic otitis media of left ear after insertion of tympanic ventilation tube      Postnasal drip    - fluticasone (FLONASE) 50 MCG/ACT nasal spray; Spray 1 spray into both nostrils daily    He will use Flonase and will start Allegra. Rx for azithromycin and benzonatate sent.  CXR today.  Follow-up in 2 weeks.                 Return in about 2 weeks (around 10/25/2022) for Follow up PCP.    Tay Andres MD  [facility]ENNY [facility]ang is a 52 year old, presenting for the following health issues:  Cough (Cough/Sore Throat x10-14 days tested Neg.  Twice for Covid)      Cough    History of Present Illness       Reason for visit:  Cough sore throat  Symptom onset:  1-2 weeks ago    He eats 2-3 servings of fruits and vegetables daily.He consumes 0 sweetened beverage(s) daily.He exercises with enough effort to increase his heart rate 30 to 60 minutes per day.  He exercises with enough effort to increase his heart rate 5 days per week.   He is taking medications regularly.             Review of Systems   Respiratory: Positive for cough.             Objective    /88   Pulse 60   Temp 97.9  F (36.6  C)   Wt 97 kg (213 lb 14.4 oz)   SpO2 100%   BMI 29.83 kg/m    Body mass index is 29.83 kg/m .  Physical Exam   Constitutional:  oriented to person, place, and time, appears well-nourished. No distress.   HENT:   Head: Normocephalic.   Ears; right TM normal, left TM tube in place, no drainage  Mouth/Throat: Oropharynx is clear and moist. No exudates  Eyes: Conjunctivae are normal. Pupils are equal, round, and reactive to light.   Neck: Normal range of motion. Neck supple.   Cardiovascular: Normal rate, regular rhythm and normal heart sounds.    Pulmonary/Chest: Effort normal and breath sounds normal.  Musculoskeletal: Normal range of motion.   Neurological: alert and oriented to person, place, and time. Skin: Skin is warm.   Psychiatric: normal mood and affect.

## 2022-10-30 ENCOUNTER — HEALTH MAINTENANCE LETTER (OUTPATIENT)
Age: 52
End: 2022-10-30

## 2022-12-02 DIAGNOSIS — R09.82 POSTNASAL DRIP: ICD-10-CM

## 2022-12-04 RX ORDER — FLUTICASONE PROPIONATE 50 MCG
SPRAY, SUSPENSION (ML) NASAL
Qty: 16 ML | Refills: 1 | Status: SHIPPED | OUTPATIENT
Start: 2022-12-04 | End: 2024-04-26

## 2022-12-04 NOTE — TELEPHONE ENCOUNTER
"Last Written Prescription Date:  10/11/22  Last Fill Quantity: 16 g,  # refills: 1   Last office visit provider:  10/11/22     Requested Prescriptions   Pending Prescriptions Disp Refills     fluticasone (FLONASE) 50 MCG/ACT nasal spray [Pharmacy Med Name: FLUTICASONE PROP 50 MCG SPRAY] 16 mL 1     Sig: INSTILL 1 SPRAY INTO BOTH NOSTRILS DAILY       Nasal Allergy Protocol Passed - 12/2/2022  9:31 AM        Passed - Patient is age 12 or older        Passed - Recent (12 mo) or future (30 days) visit within the authorizing provider's specialty     Patient has had an office visit with the authorizing provider or a provider within the authorizing providers department within the previous 12 mos or has a future within next 30 days. See \"Patient Info\" tab in inbasket, or \"Choose Columns\" in Meds & Orders section of the refill encounter.              Passed - Medication is active on med list             Aspen Fu 12/04/22 2:41 PM  "

## 2023-02-08 NOTE — PROGRESS NOTES
"  Otolaryngology Clinic      Name: Suhail Mustafa  MRN: [id number]  Age: 52 year old  : 2025      Chief Complaint:   Follow up    History of Present Illness:   Suhail Mustafa is a 52 year old male with a history of left eustachian tube dysfunction who presents for follow up. At our last visit on 8/3/2022, his ears were stable.    Today, he reports that he has needed to use drops daily for the last 3 weeks because his ear has been painful. He reports pain and pressure, and it felt like the drops were not getting though, but when they got through it felt painful. Since then, it has improved. He also reports otorrhea which started 2 weeks ago. He continues to use alcohol swabs to clean his hearing aids.    Physical Exam:   /61   Pulse 67   Temp 97.2  F (36.2  C)   Ht 1.803 m (5' 11\")   Wt 99.3 kg (219 lb)   SpO2 100%   BMI 30.54 kg/m       Male in no acute distress.  Alert and answering questions appropriately.  HB 1/6 bilaterally.  Bilateral ears examined under the microscope.  - Right: Canal is clear. TM is without effusion or retraction. Middle ear is well aerated.  - Left: Left T-tube in place anteriorly and patent. There is thick white wet debris which was swabbed for culture. Wet cerumen suctioned from canal. Middle ear well aerated.     Assessment and Plan:  Suhail Mustafa is a 52 year old male who presents for follow up. Right ear is stable today. On the left, there was thick white wet debris which I swabbed for culture. We discussed changing T-tube if infection does not resolve. He should stop ear drops for now, and I will follow up with culture results. He can follow up with me in 1 month.      Scribe Disclosure:  I, Martin Cota, am serving as a scribe to document services personally performed by Yaneth Rogel MD at this visit, based upon the provider's statements to me. All documentation has been reviewed by the aforementioned provider prior to being entered into " the official medical record.    The documentation recorded by the scribe accurately reflects the services I personally performed and the decisions made by me.      The documentation recorded by the scribe accurately reflects the services I personally performed and the decisions made by me.

## 2023-02-09 ENCOUNTER — OFFICE VISIT (OUTPATIENT)
Dept: OTOLARYNGOLOGY | Facility: CLINIC | Age: 53
End: 2023-02-09
Payer: COMMERCIAL

## 2023-02-09 VITALS
HEART RATE: 67 BPM | BODY MASS INDEX: 30.66 KG/M2 | WEIGHT: 219 LBS | HEIGHT: 71 IN | OXYGEN SATURATION: 100 % | SYSTOLIC BLOOD PRESSURE: 119 MMHG | TEMPERATURE: 97.2 F | DIASTOLIC BLOOD PRESSURE: 61 MMHG

## 2023-02-09 DIAGNOSIS — H92.12 OTORRHEA, LEFT: Primary | ICD-10-CM

## 2023-02-09 DIAGNOSIS — H69.92 DYSFUNCTION OF LEFT EUSTACHIAN TUBE: ICD-10-CM

## 2023-02-09 PROCEDURE — 87102 FUNGUS ISOLATION CULTURE: CPT | Performed by: OTOLARYNGOLOGY

## 2023-02-09 PROCEDURE — 92504 EAR MICROSCOPY EXAMINATION: CPT | Performed by: OTOLARYNGOLOGY

## 2023-02-09 PROCEDURE — 87077 CULTURE AEROBIC IDENTIFY: CPT | Performed by: OTOLARYNGOLOGY

## 2023-02-09 PROCEDURE — 99212 OFFICE O/P EST SF 10 MIN: CPT | Mod: 25 | Performed by: OTOLARYNGOLOGY

## 2023-02-09 ASSESSMENT — PAIN SCALES - GENERAL: PAINLEVEL: MODERATE PAIN (4)

## 2023-02-09 NOTE — Clinical Note
"2023       RE: Suhail Mustafa  [address]   [facility] [address]     Dear Colleague,    Thank you for referring your patient, Suhail Mustafa, to the [facility] [place] at [facility]. Please see a copy of my visit note below.      Otolaryngology Clinic      Name: Suhail Mustafa  MRN: [id number]  Age: 52 year old  : 2025      Chief Complaint:   Follow up    History of Present Illness:   Suhail Mustafa is a 52 year old male with a history of left eustachian tube dysfunction who presents for follow up. At our last visit on 8/3/2022, his ears were stable.    Today, he reports that he has needed to use drops daily for the last 3 weeks because his ear has been painful. He reports pain and pressure, and it felt like the drops were not getting though, but when they got through it felt painful. Since then, it has improved. He also report otorrhea which started 2 weeks ago. He continues to use alcohol swabs to clean his hearing aids.    Physical Exam:   /61   Pulse 67   Temp 97.2  F (36.2  C)   Ht 1.803 m (5' 11\")   Wt 99.3 kg (219 lb)   SpO2 100%   BMI 30.54 kg/m       Physical examination:  Male in no acute distress.  Alert and answering questions appropriately.  HB 1/6 bilaterally.  Bilateral ears examined under the microscope.  - Right: Canal is clear. TM is without effusion or retraction. Middle ear is well aerated.  - Left: Left T-tube in place anteriorly and patent. There is thick white wet debris which was swabbed for culture. Wet cerumen suctioned from canal. Middle ear well aerated.     Assessment and Plan:  Suhail Mustafa is a 52 year old male who presents for follow up. Right ear is stable today. On the left, there was thick white wet debris which I swabbed for culture. We discussed changing T-tube if infection does not resolve. He should stop ear drops for now, and I will follow " "up with culture results. He can follow up with me in 1 month.        Scribe Disclosure:  I, Martin Cota, am serving as a scribe to document services personally performed by Yaneth Rogel MD at this visit, based upon the provider's statements to me. All documentation has been reviewed by the aforementioned provider prior to being entered into the official medical record.      Otolaryngology Clinic      Name: Suhail Mustafa  MRN: [id number]  Age: 52 year old  : 2025      Chief Complaint:   Follow up    History of Present Illness:   Suhail Mustafa is a 52 year old male with a history of left eustachian tube dysfunction who presents for follow up. At our last visit on 8/3/2022, his ears were stable.    Today, he reports that he has needed to use drops daily for the last 3 weeks because his ear has been painful. He reports pain and pressure, and it felt like the drops were not getting though, but when they got through it felt painful. Since then, it has improved. He also reports otorrhea which started 2 weeks ago. He continues to use alcohol swabs to clean his hearing aids.    Physical Exam:   /61   Pulse 67   Temp 97.2  F (36.2  C)   Ht 1.803 m (5' 11\")   Wt 99.3 kg (219 lb)   SpO2 100%   BMI 30.54 kg/m       Male in no acute distress.  Alert and answering questions appropriately.  HB 1/6 bilaterally.  Bilateral ears examined under the microscope.  - Right: Canal is clear. TM is without effusion or retraction. Middle ear is well aerated.  - Left: Left T-tube in place anteriorly and patent. There is thick white wet debris which was swabbed for culture. Wet cerumen suctioned from canal. Middle ear well aerated.     Assessment and Plan:  Suhail Mustafa is a 52 year old male who presents for follow up. Right ear is stable today. On the left, there was thick white wet debris which I swabbed for culture. We discussed changing T-tube if infection does not resolve. He should stop " ear drops for now, and I will follow up with culture results. He can follow up with me in 1 month.      Scribe Disclosure:  I, Martin Cipriano, am serving as a scribe to document services personally performed by Yaneth Rogel MD at this visit, based upon the provider's statements to me. All documentation has been reviewed by the aforementioned provider prior to being entered into the official medical record.    The documentation recorded by the scribe accurately reflects the services I personally performed and the decisions made by me.      The documentation recorded by the scribe accurately reflects the services I personally performed and the decisions made by me.        Again, thank you for allowing me to participate in the care of your patient.      Sincerely,    Yaneth Rogel MD

## 2023-02-09 NOTE — LETTER
"[birth year]23      RE: Suhail Mustafa  [address]   [facility] [address]         Otolaryngology Clinic      Name: Suhail Mustafa  MRN: [id number]  Age: 52 year old  : 2025      Chief Complaint:   Follow up    History of Present Illness:   Suhail Mustafa is a 52 year old male with a history of left eustachian tube dysfunction who presents for follow up. At our last visit on 8/3/2022, his ears were stable.    Today, he reports that he has needed to use drops daily for the last 3 weeks because his ear has been painful. He reports pain and pressure, and it felt like the drops were not getting though, but when they got through it felt painful. Since then, it has improved. He also reports otorrhea which started 2 weeks ago. He continues to use alcohol swabs to clean his hearing aids.    Physical Exam:   /61   Pulse 67   Temp 97.2  F (36.2  C)   Ht 1.803 m (5' 11\")   Wt 99.3 kg (219 lb)   SpO2 100%   BMI 30.54 kg/m       Male in no acute distress.  Alert and answering questions appropriately.  HB 1/6 bilaterally.  Bilateral ears examined under the microscope.  - Right: Canal is clear. TM is without effusion or retraction. Middle ear is well aerated.  - Left: Left T-tube in place anteriorly and patent. There is thick white wet debris which was swabbed for culture. Wet cerumen suctioned from canal. Middle ear well aerated.     Assessment and Plan:  Suhail Mustafa is a 52 year old male who presents for follow up. Right ear is stable today. On the left, there was thick white wet debris which I swabbed for culture. We discussed changing T-tube if infection does not resolve. He should stop ear drops for now, and I will follow up with culture results. He can follow up with me in 1 month.      Scribe Disclosure:  I, Martin Cota, am serving as a scribe to document services personally performed by Yaneth Rogel MD at this visit, based upon the provider's statements to me. All " documentation has been reviewed by the aforementioned provider prior to being entered into the official medical record.    The documentation recorded by the scribe accurately reflects the services I personally performed and the decisions made by me.      The documentation recorded by the scribe accurately reflects the services I personally performed and the decisions made by me.        Yaneth Rogel MD

## 2023-02-11 LAB — BACTERIA SPEC CULT: ABNORMAL

## 2023-02-13 DIAGNOSIS — H92.12 OTORRHEA, LEFT: Primary | ICD-10-CM

## 2023-02-13 RX ORDER — SULFACETAMIDE SODIUM AND PREDNISOLONE SODIUM PHOSPHATE 100; 2.3 MG/ML; MG/ML
5 SOLUTION/ DROPS OPHTHALMIC 2 TIMES DAILY
Qty: 5 ML | Refills: 2 | Status: SHIPPED | OUTPATIENT
Start: 2023-02-13 | End: 2023-05-04

## 2023-03-08 NOTE — PROGRESS NOTES
"  Otolaryngology Clinic      Name: Suhail Mustafa  MRN: [id number]  Age: 52 year old  : 2025      Chief Complaint:   Follow up    History of Present Illness:   Suhail Mustafa is a 52 year old male with a history of eustachian tube dysfunction with t-tube in left TM who presents for follow up. Last seen on 23 after he had a round of otalgia in the left ear that required drops for 3 weeks. Culture returned with normal albin but patient restarted on ear drops. He continues to have discomfort of the left ear and has found Vosol to be more effective than Vasocidin. We had discussed previously that we would remove the t-tube if he continued to have issues as there may be biofilm on the tube.    Physical Exam:   /75   Pulse 74   Temp 97.1  F (36.2  C)   Ht 1.803 m (5' 11\")   Wt 98.4 kg (217 lb)   SpO2 99%   BMI 30.27 kg/m       Male in no acute distress.  Alert and answering questions appropriately.  HB 1/6 bilaterally.  Left ear examined under the microscope. There is some crusting around the meatus. Left t-tube removed with alligator. Residual wet cerumen suctioned from the canal and within the cartilaginous opening. TM was very thick and inflamed where the t-tube was removed. Middle ear appeared relatively clear.     Assessment and Plan:  Suhail Mustafa is a 52 year old male who presents for follow up. I removed his left t-tube today as he is still experiencing discomfort and moisture. I will send this for culture and would like for him to start Pred-Forte and Bactroban application to the external ear for possible EAC staph infection. Follow-up in two weeks.      Scribe Disclosure:  I, Raul Clayton, am serving as a scribe to document services personally performed by Yaneth Rogel MD at this visit, based upon the provider's statements to me. All documentation has been reviewed by the aforementioned provider prior to being entered into the official medical record.    The " documentation recorded by the scribe accurately reflects the services I personally performed and the decisions made by me.

## 2023-03-09 ENCOUNTER — OFFICE VISIT (OUTPATIENT)
Dept: OTOLARYNGOLOGY | Facility: CLINIC | Age: 53
End: 2023-03-09
Payer: COMMERCIAL

## 2023-03-09 VITALS
SYSTOLIC BLOOD PRESSURE: 109 MMHG | OXYGEN SATURATION: 99 % | DIASTOLIC BLOOD PRESSURE: 75 MMHG | HEIGHT: 71 IN | HEART RATE: 74 BPM | WEIGHT: 217 LBS | BODY MASS INDEX: 30.38 KG/M2 | TEMPERATURE: 97.1 F

## 2023-03-09 DIAGNOSIS — H69.92 DYSFUNCTION OF LEFT EUSTACHIAN TUBE: ICD-10-CM

## 2023-03-09 DIAGNOSIS — H92.12 OTORRHEA, LEFT: Primary | ICD-10-CM

## 2023-03-09 LAB — BACTERIA SPEC CULT: NO GROWTH

## 2023-03-09 PROCEDURE — 87102 FUNGUS ISOLATION CULTURE: CPT | Performed by: PATHOLOGY

## 2023-03-09 PROCEDURE — 87186 SC STD MICRODIL/AGAR DIL: CPT | Performed by: PATHOLOGY

## 2023-03-09 PROCEDURE — 99212 OFFICE O/P EST SF 10 MIN: CPT | Mod: 25 | Performed by: OTOLARYNGOLOGY

## 2023-03-09 PROCEDURE — 99000 SPECIMEN HANDLING OFFICE-LAB: CPT | Performed by: PATHOLOGY

## 2023-03-09 PROCEDURE — 87070 CULTURE OTHR SPECIMN AEROBIC: CPT | Performed by: PATHOLOGY

## 2023-03-09 PROCEDURE — 92504 EAR MICROSCOPY EXAMINATION: CPT | Performed by: OTOLARYNGOLOGY

## 2023-03-09 RX ORDER — MUPIROCIN 20 MG/G
OINTMENT TOPICAL
Qty: 1 G | Refills: 0 | Status: SHIPPED | OUTPATIENT
Start: 2023-03-09 | End: 2023-03-19

## 2023-03-09 RX ORDER — PREDNISOLONE ACETATE 10 MG/ML
4 SUSPENSION/ DROPS OPHTHALMIC 2 TIMES DAILY
Qty: 10 ML | Refills: 0 | Status: SHIPPED | OUTPATIENT
Start: 2023-03-09 | End: 2023-06-15

## 2023-03-09 ASSESSMENT — PAIN SCALES - GENERAL: PAINLEVEL: MODERATE PAIN (5)

## 2023-03-09 NOTE — PATIENT INSTRUCTIONS
1. You were seen in the ENT Clinic today by Dr. Rogel.  If you have any questions or concerns after your appointment, please call   - Option 1: ENT Clinic: [phone]   - Option 2: Bettie (Dr. Rogel's Nurse): [phone]          Teri (Dr. Rogel's Nurse): [phone]     2.   Plan to return to clinic in 2 weeks    3. Left ear tube- will call with results    4. Pred-forte in left ear til next follow up    How to Contact Us:  Send a Clarient message to your provider. Our team will respond to you via Clarient. Occasionally, we will need to call you to get further information.  For urgent matters (Monday-Friday), call the ENT Clinic: [phone] and speak with a call center team member - they will route your call appropriately.   If you'd like to speak directly with a nurse, please find our contact information below. We do our best to check voicemail frequently throughout the day, and will work to call you back within 1-2 days. For urgent matters, please use the general clinic phone numbers listed above.        Bettie NICOLE LPN  [facility] - Otolaryngology

## 2023-03-09 NOTE — NURSING NOTE
"Chief Complaint   Patient presents with     RECHECK     1 MONTH FOLLOW UP   Blood pressure 109/75, pulse 74, temperature 97.1  F (36.2  C), height 1.803 m (5' 11\"), weight 98.4 kg (217 lb), SpO2 99 %.    Magdi Timmons LPN      "

## 2023-03-09 NOTE — LETTER
"3/9/2023      RE: Suhail Mustafa  [address]   [facility] [address]         Otolaryngology Clinic      Name: Suhail Mustafa  MRN: [id number]  Age: 52 year old  : 2025      Chief Complaint:   Follow up    History of Present Illness:   Suhail Mustafa is a 52 year old male with a history of eustachian tube dysfunction with t-tube in left TM who presents for follow up. Last seen on 23 after he had a round of otalgia in the left ear that required drops for 3 weeks. Culture returned with normal albin but patient restarted on ear drops. He continues to have discomfort of the left ear and has found Vosol to be more effective than Vasocidin. We had discussed previously that we would remove the t-tube if he continued to have issues as there may be biofilm on the tube.    Physical Exam:   /75   Pulse 74   Temp 97.1  F (36.2  C)   Ht 1.803 m (5' 11\")   Wt 98.4 kg (217 lb)   SpO2 99%   BMI 30.27 kg/m       Male in no acute distress.  Alert and answering questions appropriately.  HB 1/6 bilaterally.  Left ear examined under the microscope. There is some crusting around the meatus. Left t-tube removed with alligator. Residual wet cerumen suctioned from the canal and within the cartilaginous opening. TM was very thick and inflamed where the t-tube was removed. Middle ear appeared relatively clear.     Assessment and Plan:  Suhail Mustafa is a 52 year old male who presents for follow up. I removed his left t-tube today as he is still experiencing discomfort and moisture. I will send this for culture and would like for him to start Pred-Forte and Bactroban application to the external ear for possible EAC staph infection. Follow-up in two weeks.      Scribe Disclosure:  I, Raul Clayton, am serving as a scribe to document services personally performed by Yaneth Rogel MD at this visit, based upon the provider's statements to me. All documentation has been reviewed by the " aforementioned provider prior to being entered into the official medical record.    The documentation recorded by the scribe accurately reflects the services I personally performed and the decisions made by me.       Yaneth Rogel MD

## 2023-03-11 LAB
BACTERIA SPEC CULT: ABNORMAL
BACTERIA SPEC CULT: ABNORMAL

## 2023-03-14 ENCOUNTER — TELEPHONE (OUTPATIENT)
Dept: OTOLARYNGOLOGY | Facility: CLINIC | Age: 53
End: 2023-03-14
Payer: COMMERCIAL

## 2023-03-14 DIAGNOSIS — B95.8 STAPH INFECTION: Primary | ICD-10-CM

## 2023-03-14 RX ORDER — AMOXICILLIN 500 MG/1
500 CAPSULE ORAL 2 TIMES DAILY
Qty: 28 CAPSULE | Refills: 0 | Status: SHIPPED | OUTPATIENT
Start: 2023-03-14 | End: 2023-03-28

## 2023-03-15 NOTE — TELEPHONE ENCOUNTER
Pt is out of state in [place] til Friday. Will get meds when he is back in [place]. Let provider know.    Bettie Forbes LPN

## 2023-03-23 ENCOUNTER — OFFICE VISIT (OUTPATIENT)
Dept: OTOLARYNGOLOGY | Facility: CLINIC | Age: 53
End: 2023-03-23
Payer: COMMERCIAL

## 2023-03-23 VITALS
HEART RATE: 70 BPM | DIASTOLIC BLOOD PRESSURE: 86 MMHG | WEIGHT: 220 LBS | OXYGEN SATURATION: 98 % | HEIGHT: 71 IN | BODY MASS INDEX: 30.8 KG/M2 | SYSTOLIC BLOOD PRESSURE: 122 MMHG | TEMPERATURE: 97.9 F

## 2023-03-23 DIAGNOSIS — H90.72 MIXED CONDUCTIVE AND SENSORINEURAL HEARING LOSS OF LEFT EAR WITH UNRESTRICTED HEARING OF RIGHT EAR: ICD-10-CM

## 2023-03-23 DIAGNOSIS — H69.92 DYSFUNCTION OF LEFT EUSTACHIAN TUBE: Primary | ICD-10-CM

## 2023-03-23 PROCEDURE — 99212 OFFICE O/P EST SF 10 MIN: CPT | Mod: 25 | Performed by: OTOLARYNGOLOGY

## 2023-03-23 PROCEDURE — 92504 EAR MICROSCOPY EXAMINATION: CPT | Performed by: OTOLARYNGOLOGY

## 2023-03-23 ASSESSMENT — PAIN SCALES - GENERAL: PAINLEVEL: NO PAIN (0)

## 2023-03-23 NOTE — NURSING NOTE
"Chief Complaint   Patient presents with     RECHECK     2 week follow up      Blood pressure 122/86, pulse 70, temperature 97.9  F (36.6  C), height 1.803 m (5' 11\"), weight 99.8 kg (220 lb), SpO2 98 %.    Magdi Timmons LPN'    "

## 2023-03-23 NOTE — PROGRESS NOTES
"  Otolaryngology Clinic      Name: Suhail Mustafa  MRN: [id number]  Age: 52 year old  : 2025      Chief Complaint:   Follow up    History of Present Illness:   Suhail Mustafa is a 52 year old male with a history of eustachian tube dysfunction with t-tube in left ear who presents for follow up. At our last visit on 3/9/23 he had persistent discomfort in the left ear so was started on Pred-forte and Bactroban around the meatus and the t-tube was removed and cultured. Culture showed enterococcus. Today he tells me that he has continued the drops and antibiotics and he was started on amoxicillin and gentamicin drops. He didn't start his vancomycin until recently. His ears have been doing well. He has been cleaning his hearing aids every other day. He has a follow-up appointment with audiology next month.     Culture results 3/9/23  Fungal or yeast: No growth after 13 days   Aerobic bacteria:    2+ Enterococcus faecalis Abnormal    1+ Staphylococcus epidermidis Abnormal      Physical Exam:   /86   Pulse 70   Temp 97.9  F (36.6  C)   Ht 1.803 m (5' 11\")   Wt 99.8 kg (220 lb)   SpO2 98%   BMI 30.68 kg/m       Male in no acute distress.  Alert and answering questions appropriately.  HB 1/6 bilaterally.  Left ear examined under the microscope. Wet cerumen, likely due to drop residue was cleaned with suction. Perforation is healed with no retraction. TM thick.     Assessment and Plan:  Suhail Mustafa is a 52 year old male who presents for follow up. His ear currently does not appear infected. I suspect his symptoms were due to his t-tube so do not feel tube placement is appropriate at this time but will need to continue monitoring for retraction. We discussed option of balloon dilation for ETD and I offered referral to Dr. Waddell for further consideration of this. He should finish the oral antibiotics and ear drops. He can discontinue dry ear precautions as the perforation has healed over. " We otherwise will plan to monitor the left ear and he will follow-up in 3 months.     Scribe Disclosure:  I, Jennifer Matthews, am serving as a scribe to document services personally performed by Yaneth Rogel MD at this visit, based upon the provider's statements to me. All documentation has been reviewed by the aforementioned provider prior to being entered into the official medical record.     The documentation recorded by the scribe accurately reflects the services I personally performed and the decisions made by me.

## 2023-03-23 NOTE — PATIENT INSTRUCTIONS
1. You were seen in the ENT Clinic today by Dr. Rogel.  If you have any questions or concerns after your appointment, please call   - Option 1: ENT Clinic: [phone]   - Option 2: Bettie (Dr. Rogel's Nurse): [phone]          Teri (Dr. Rogel's Nurse): [phone]     2.   Plan to return to clinic in 3 months    How to Contact Us:  Send a Oxynade message to your provider. Our team will respond to you via Oxynade. Occasionally, we will need to call you to get further information.  For urgent matters (Monday-Friday), call the ENT Clinic: [phone] and speak with a call center team member - they will route your call appropriately.   If you'd like to speak directly with a nurse, please find our contact information below. We do our best to check voicemail frequently throughout the day, and will work to call you back within 1-2 days. For urgent matters, please use the general clinic phone numbers listed above.        Bettie NICOLE LPN  [facility] - Otolaryngology

## 2023-03-23 NOTE — LETTER
"3/23/2023      RE: Suhail Mustafa  [address]   [facility] [address]         Otolaryngology Clinic      Name: Suhail Mustafa  MRN: [id number]  Age: 52 year old  : 2023      Chief Complaint:   Follow up    History of Present Illness:   Suhail Mustafa is a 52 year old male with a history of eustachian tube dysfunction with t-tube in left ear who presents for follow up. At our last visit on 3/9/23 he had persistent discomfort in the left ear so was started on Pred-forte and Bactroban around the meatus and the t-tube was removed and cultured. Culture showed enterococcus. Today he tells me that he has continued the drops and antibiotics and he was started on amoxicillin and gentamicin drops. He didn't start his vancomycin until recently. His ears have been doing well. He has been cleaning his hearing aids every other day. He has a follow-up appointment with audiology next month.     Culture results 3/9/23  Fungal or yeast: No growth after 13 days   Aerobic bacteria:    2+ Enterococcus faecalis Abnormal    1+ Staphylococcus epidermidis Abnormal      Physical Exam:   /86   Pulse 70   Temp 97.9  F (36.6  C)   Ht 1.803 m (5' 11\")   Wt 99.8 kg (220 lb)   SpO2 98%   BMI 30.68 kg/m       Male in no acute distress.  Alert and answering questions appropriately.  HB 1/6 bilaterally.  Left ear examined under the microscope. Wet cerumen, likely due to drop residue was cleaned with suction. Perforation is healed with no retraction. TM thick.     Assessment and Plan:  Suhail Mustafa is a 52 year old male who presents for follow up. His ear currently does not appear infected. I suspect his symptoms were due to his t-tube so do not feel tube placement is appropriate at this time but will need to continue monitoring for retraction. We discussed option of balloon dilation for ETD and I offered referral to Dr. Waddell for further consideration of this. He should finish the oral antibiotics and " ear drops. He can discontinue dry ear precautions as the perforation has healed over. We otherwise will plan to monitor the left ear and he will follow-up in 3 months.     Scribe Disclosure:  I, Jennifer Cassie, am serving as a scribe to document services personally performed by Yaneth Rogel MD at this visit, based upon the provider's statements to me. All documentation has been reviewed by the aforementioned provider prior to being entered into the official medical record.     The documentation recorded by the scribe accurately reflects the services I personally performed and the decisions made by me.      Yaneth Rogel MD

## 2023-04-06 LAB — BACTERIA SPEC CULT: NO GROWTH

## 2023-04-14 ENCOUNTER — OFFICE VISIT (OUTPATIENT)
Dept: AUDIOLOGY | Facility: CLINIC | Age: 53
End: 2023-04-14
Payer: COMMERCIAL

## 2023-04-14 DIAGNOSIS — H90.A32 MIXED CONDUCTIVE AND SENSORINEURAL HEARING LOSS OF LEFT EAR WITH RESTRICTED HEARING OF RIGHT EAR: ICD-10-CM

## 2023-04-14 DIAGNOSIS — H90.A21 SENSORINEURAL HEARING LOSS (SNHL) OF RIGHT EAR WITH RESTRICTED HEARING OF LEFT EAR: Primary | ICD-10-CM

## 2023-04-14 PROCEDURE — 92591 PR HEARING AID EXAM BINAURAL: CPT

## 2023-04-14 PROCEDURE — V5275 EAR IMPRESSION: HCPCS | Mod: LT

## 2023-04-14 PROCEDURE — 92557 COMPREHENSIVE HEARING TEST: CPT

## 2023-04-14 PROCEDURE — 92550 TYMPANOMETRY & REFLEX THRESH: CPT

## 2023-04-14 NOTE — Clinical Note
Hi Dr. Rogel,   I wanted to send you Suhail's updated audiometric results as his hearing did change in his left ear.   Osiel Davis, CCC-A Licensed Audiologist [place] #[id number]   04/14/23

## 2023-04-14 NOTE — PROGRESS NOTES
AUDIOLOGY REPORT    SUBJECTIVE:  Suhail Mustafa is a 52 year old male who was seen in the Audiology Clinic at the [facility] for audiologic evaluation, referred by self. The patient has been seen previously at the [facility] for assessment and results indicated a mixed hearing loss in the left ear and normal hearing in the right. Suhail has a history of left ear surgeries which include a tympanoplasty with PORP placement in 2015 and PE tubes. He notes a recent removal of his left PE tube.    The patient reports a possible decline in hearing and is interested in updated amplification. The patient denies dizziness, bilateral otalgia, bilateral drainage, bilateral aural fullness and changes to his longstanding tinnitus. The patient notes difficulty with communication in a variety of listening situations. He was not accompanied to today's appointment. He is currently wearing a LEFT 2016 PhonQ2ebanking V50-SP BTE hearing aid.     OBJECTIVE:    Fall Risk Screen:  1. Have you fallen two or more times in the past year? No  2. Have you fallen and had an injury in the past year? No    Otoscopic exam indicates ears are clear of cerumen bilaterally     Pure Tone Thresholds assessed using conventional audiometry with good  reliability from 250-8000 Hz bilaterally using insert earphones and circumaural headphones     RIGHT:  normal sloping to mild sensorineural hearing loss    LEFT:    moderate sloping to moderate-severe at 4000 Hz rising to moderate mixed hearing loss    Tympanogram:    RIGHT: Hypermobile    LEFT:   restricted eardrum mobility     Reflexes (reported by stimulus ear):  RIGHT: Ipsilateral is absent at frequencies tested  RIGHT: Contralateral is absent at frequencies tested  LEFT:   Ipsilateral is absent at frequencies tested  LEFT:   Contralateral is absent at frequencies tested      Speech Reception Threshold:    RIGHT: 20 dB HL    LEFT:   50[20] dB  HL    Word Recognition Score:     RIGHT: 100% at 60 dB HL using NU-6 recorded word list.    LEFT:   1100% at 80 dB HL using NU-6 recorded word list.      ASSESSMENT:   Borderline normal hearing in the right ear and a moderate to moderately-severe mixed hearing loss in the left ear. Compared to patient's previous audiogram dated 7/25/2019, hearing has remained stable with the exception of a 15-20 dB decline at 500, 1000 and 4000 Hz in the left ear. Today s results were discussed with the patient in detail.     Hearing aid consult to follow    AUDIOLOGY REPORT    OBJECTIVE:  Patient is a hearing aid candidate. Patient would like to move forward with a hearing aid evaluation today. Therefore, the patient was presented with different options for amplification to help aid in communication. Discussed styles, levels of technology and monaural vs. binaural fitting. Patient is interested in a right hearing aid trial due to directionality and bluetooth purposes.     The hearing aid(s) mutually chosen were:  Binaural: Phonak Audeo L50-LR  COLOR: P5  BATTERY SIZE: rechargeable  EARMOLD/TIPS: Silicone slimtip (L), medium open (R)  CANAL/ LENGTH: 2M    Otoscopy revealed ears are clear of cerumen bilaterally. A left earmold was taken without incident.    Patient's current left hearing aid was connected to software where it was re-programmed to today's audiometric results. Tubing was changed and a listening check revealed proper programming. Patient reports good fit and sound output.    ASSESSMENT:   Reviewed purchase information and warranty information with patient. The 45 day trial period was explained to patient. The patient was given a copy of the [place] Department of [employer] consumer brochure on purchasing hearing instruments. Patient risk factors have been provided to the patient in writing prior to the sale of the hearing aid per FDA regulation. The risk factors are also available in the User Instructional Booklet  to be presented on the day of the hearing aid fitting. Hearing aid(s) ordered. Hearing aid evaluation completed.    PLAN: Suhail is scheduled to return in 2-3 weeks for a hearing aid fitting and programming. Purchase agreement will be completed on that date. Please contact this clinic with any questions or concerns.      Jacquelyn Castellon Au.D.CCC-A  Licensed Audiologist  [place] #[id number]     4/14/2023     CC: Yaneth Rogel MD

## 2023-05-01 DIAGNOSIS — H69.92 DYSFUNCTION OF LEFT EUSTACHIAN TUBE: Primary | ICD-10-CM

## 2023-05-01 RX ORDER — ACETAMINOPHEN 325 MG/1
975 TABLET ORAL ONCE
Status: CANCELLED | OUTPATIENT
Start: 2023-05-01 | End: 2023-05-01

## 2023-05-02 ENCOUNTER — TELEPHONE (OUTPATIENT)
Dept: FAMILY MEDICINE | Facility: CLINIC | Age: 53
End: 2023-05-02
Payer: COMMERCIAL

## 2023-05-02 ENCOUNTER — TELEPHONE (OUTPATIENT)
Dept: OTOLARYNGOLOGY | Facility: CLINIC | Age: 53
End: 2023-05-02
Payer: COMMERCIAL

## 2023-05-02 PROBLEM — H69.92 DYSFUNCTION OF LEFT EUSTACHIAN TUBE: Status: ACTIVE | Noted: 2023-05-02

## 2023-05-02 NOTE — TELEPHONE ENCOUNTER
----- Message from Melva Degroot sent at 5/2/2023  2:30 PM CDT -----    ----- Message -----  From: Yaneth Rogel MD  Sent: 5/1/2023   8:39 PM CDT  To: Melva Crowell. I don't suppose he'd want to do this next Monday if he can get a H&P in time? Thanks.

## 2023-05-02 NOTE — TELEPHONE ENCOUNTER
Called patient to schedule surgery with Dr. Rogel    Date of Surgery: 5/8    Location of surgery: [facility]    Pre-Op H&P: PCP on file - calling today to get in ASAP    Pre/Post Imaging:  Not Applicable    Post-Op Appt Date: 6 weeks with audiogram    Surgery Packet Mailed: e(ye)BRAINitzel    Additional comments: JOSSELYN Cruz on 5/2/2023 at 2:39 PM

## 2023-05-03 NOTE — TELEPHONE ENCOUNTER
Spoke to patient on the phone and let them know there is nothing sooner available for a pre-op. Patient is already scheduled but was just wondering if there was any option to see PCP. Unfortunately there is not an open slot sooner. So patient will be going to [place] on 05/04/23 for the 05/08/23 surgery.     Thank you,   Priyanka Palacio -    [facility]

## 2023-05-03 NOTE — TELEPHONE ENCOUNTER
Reason for Call:  Appointment Request    Patient requesting this type of appt: Pre-op    Requested provider: To [facility]    Reason patient unable to be scheduled: Not within requested timeframe    When does patient want to be seen/preferred time: 1-2 days    Comments: pre-op as soon as tomorrow or Thursday. DOS: 5/8/23 surgeon: Yaneth Rogel. (Patient does have a pre-op scheduled for 5/4/23 at [facility] but would like it with his primary clinic and doctor if possible. He also has boy  papers that needs to be sign that he'll bring along with the appt.     Could we [facility] you [facility] or would you prefer to receive a phone call?:   Patient would prefer a phone call   Okay to leave a detailed message?: Yes at Cell number on file:    Telephone Information:   Mobile [phone]       Call taken on 5/2/2023 at 7:15 PM by KISHA BARTON

## 2023-05-04 ENCOUNTER — OFFICE VISIT (OUTPATIENT)
Dept: FAMILY MEDICINE | Facility: CLINIC | Age: 53
End: 2023-05-04
Payer: COMMERCIAL

## 2023-05-04 VITALS
HEART RATE: 65 BPM | RESPIRATION RATE: 19 BRPM | BODY MASS INDEX: 30.24 KG/M2 | WEIGHT: 216 LBS | DIASTOLIC BLOOD PRESSURE: 70 MMHG | SYSTOLIC BLOOD PRESSURE: 122 MMHG | HEIGHT: 71 IN | OXYGEN SATURATION: 100 %

## 2023-05-04 DIAGNOSIS — H69.92 DYSFUNCTION OF LEFT EUSTACHIAN TUBE: ICD-10-CM

## 2023-05-04 DIAGNOSIS — N18.31 STAGE 3A CHRONIC KIDNEY DISEASE (H): ICD-10-CM

## 2023-05-04 DIAGNOSIS — Z01.818 PREOP GENERAL PHYSICAL EXAM: Primary | ICD-10-CM

## 2023-05-04 LAB
ANION GAP SERPL CALCULATED.3IONS-SCNC: 8 MMOL/L (ref 7–15)
BUN SERPL-MCNC: 19.5 MG/DL (ref 6–20)
CALCIUM SERPL-MCNC: 9.1 MG/DL (ref 8.6–10)
CHLORIDE SERPL-SCNC: 105 MMOL/L (ref 98–107)
CREAT SERPL-MCNC: 1.44 MG/DL (ref 0.67–1.17)
DEPRECATED HCO3 PLAS-SCNC: 27 MMOL/L (ref 22–29)
GFR SERPL CREATININE-BSD FRML MDRD: 58 ML/MIN/1.73M2
GLUCOSE SERPL-MCNC: 88 MG/DL (ref 70–99)
HGB BLD-MCNC: 14.1 G/DL (ref 13.3–17.7)
POTASSIUM SERPL-SCNC: 4.9 MMOL/L (ref 3.4–5.3)
SODIUM SERPL-SCNC: 140 MMOL/L (ref 136–145)

## 2023-05-04 PROCEDURE — 85018 HEMOGLOBIN: CPT | Performed by: PHYSICIAN ASSISTANT

## 2023-05-04 PROCEDURE — 80048 BASIC METABOLIC PNL TOTAL CA: CPT | Performed by: PHYSICIAN ASSISTANT

## 2023-05-04 PROCEDURE — 99213 OFFICE O/P EST LOW 20 MIN: CPT | Performed by: PHYSICIAN ASSISTANT

## 2023-05-04 PROCEDURE — 36415 COLL VENOUS BLD VENIPUNCTURE: CPT | Performed by: PHYSICIAN ASSISTANT

## 2023-05-04 NOTE — PATIENT INSTRUCTIONS
For informational purposes only. Not to replace the advice of your health care provider. Copyright   2003,  [employer] [facility]. All rights reserved. Clinically reviewed by Gill Brown MD. [employer] [id number] - REV .  Preparing for Your Surgery  Getting started  A nurse will call you to review your health history and instructions. They will give you an arrival time based on your scheduled surgery time. Please be ready to share:  Your doctor's clinic name and phone number  Your medical, surgical, and anesthesia history  A list of allergies and sensitivities  A list of medicines, including herbal treatments and over-the-counter drugs  Whether the patient has a legal guardian (ask how to send us the papers in advance)  Please tell us if you're pregnant--or if there's any chance you might be pregnant. Some surgeries may injure a fetus (unborn baby), so they require a pregnancy test. Surgeries that are safe for a fetus don't always need a test, and you can choose whether to have one.   If you have a child who's having surgery, please ask for a copy of Preparing for Your Child's Surgery.    Preparing for surgery  Within 10 to 30 days of surgery: Have a pre-op exam (sometimes called an H&P, or History and Physical). This can be done at a clinic or pre-operative center.  If you're having a , you may not need this exam. Talk to your care team.  At your pre-op exam, talk to your care team about all medicines you take. If you need to stop any medicines before surgery, ask when to start taking them again.  We do this for your safety. Many medicines can make you bleed too much during surgery. Some change how well surgery (anesthesia) drugs work.  Call your insurance company to let them know you're having surgery. (If you don't have insurance, call [phone].)  Call your clinic if there's any change in your health. This includes signs of a cold or flu (sore throat, runny nose, cough, rash, fever). It  also includes a scrape or scratch near the surgery site.  If you have questions on the day of surgery, call your hospital or surgery center.  Eating and drinking guidelines  For your safety: Unless your surgeon tells you otherwise, follow the guidelines below.  Eat and drink as usual until 8 hours before you arrive for surgery. After that, no food or milk.  Drink clear liquids until 2 hours before you arrive. These are liquids you can see through, like water, Gatorade, and Propel Water. They also include plain black coffee and tea (no cream or milk), candy, and breath mints. You can spit out gum when you arrive.  If you drink alcohol: Stop drinking it the night before surgery.  If your care team tells you to take medicine on the morning of surgery, it's okay to take it with a sip of water.  Preventing infection  Shower or bathe the night before and morning of your surgery. Follow the instructions your clinic gave you. (If no instructions, use regular soap.)  Don't shave or clip hair near your surgery site. We'll remove the hair if needed.  Don't smoke or vape the morning of surgery. You may chew nicotine gum up to 2 hours before surgery. A nicotine patch is okay.  Note: Some surgeries require you to completely quit smoking and nicotine. Check with your surgeon.  Your care team will make every effort to keep you safe from infection. We will:  Clean our hands often with soap and water (or an alcohol-based hand rub).  Clean the skin at your surgery site with a special soap that kills germs.  Give you a special gown to keep you warm. (Cold raises the risk of infection.)  Wear special hair covers, masks, gowns and gloves during surgery.  Give antibiotic medicine, if prescribed. Not all surgeries need antibiotics.  What to bring on the day of surgery  Photo ID and insurance card  Copy of your health care directive, if you have one  Glasses and hearing aids (bring cases)  You can't wear contacts during surgery  Inhaler and  eye drops, if you use them (tell us about these when you arrive)  CPAP machine or breathing device, if you use them  A few personal items, if spending the night  If you have . . .  A pacemaker, ICD (cardiac defibrillator) or other implant: Bring the ID card.  An implanted stimulator: Bring the remote control.  A legal guardian: Bring a copy of the certified (court-stamped) guardianship papers.  Please remove any jewelry, including body piercings. Leave jewelry and other valuables at home.  If you're going home the day of surgery  You must have a responsible adult drive you home. They should stay with you overnight as well.  If you don't have someone to stay with you, and you aren't safe to go home alone, we may keep you overnight. Insurance often won't pay for this.  After surgery  If it's hard to control your pain or you need more pain medicine, please call your surgeon's office.  Questions?   If you have any questions for your care team, list them here: _________________________________________________________________________________________________________________________________________________________________________ ____________________________________ ____________________________________ ____________________________________    How to Take Your Medication Before Surgery  - Take all of your medications before surgery as usual

## 2023-05-04 NOTE — PROGRESS NOTES
[facility]  [address] [address]  Phone: [phone]  Fax: [phone]  Primary Provider: To Whitt  Pre-op Performing Provider: CAITLIN ROBERTSON      PREOPERATIVE EVALUATION:  Today's date: 5/4/2023    Suhail Mustafa is a 52 year old male who presents for a preoperative evaluation.       View : No data to display.              Surgical Information:  Surgery/Procedure: COMBINED MYRINGOTOMY, INSERT TUBE left  Surgery Location: [facility] [place]  Surgeon: Yaneth Rogel MD  Surgery Date: 5/8/23  Time of Surgery: 9:45 am  Where patient plans to recover: At home with family  Fax number for surgical facility: Note does not need to be faxed, will be available electronically in Epic.    Assessment & Plan     The proposed surgical procedure is considered LOW risk.    Stage 3a chronic kidney disease (H)  S/p kidney donation.  CKD stable  - BASIC METABOLIC PANEL; Future  - Hemoglobin; Future    Preop general physical exam    Dysfunction of left eustachian tube          - No identified additional risk factors other than previously addressed    Antiplatelet or Anticoagulation Medication Instructions:   - Patient is on no antiplatelet or anticoagulation medications.    Additional Medication Instructions:   - Statins: Continue taking on the day of surgery.     RECOMMENDATION:  APPROVAL GIVEN to proceed with proposed procedure, without further diagnostic evaluation.    956}    Subjective     HPI related to upcoming procedure: left ear pain, had myringotomy tube that came out and is having pain, will have another tube placed.         5/4/2023     2:44 PM   Preop Questions   1. Have you ever had a heart attack or stroke? No   2. Have you ever had surgery on your heart or blood vessels, such as a stent placement, a coronary artery bypass, or surgery on an artery in your head, neck, heart, or legs? No   3. Do you have chest pain with activity? No   4. Do you have a history of  heart  failure? No   5. Do you currently have a cold, bronchitis or symptoms of other infection? No   6. Do you have a cough, shortness of breath, or wheezing? No   7. Do you or anyone in your family have previous history of blood clots? No   8. Do you or does anyone in your family have a serious bleeding problem such as prolonged bleeding following surgeries or cuts? No   9. Have you ever had problems with anemia or been told to take iron pills? YES    10. Have you had any abnormal blood loss such as black, tarry or bloody stools? No   11. Have you ever had a blood transfusion? No   12. Are you willing to have a blood transfusion if it is medically needed before, during, or after your surgery? Yes- donates blood frequently, has had low hgb a couple of years ago   13. Have you or any of your relatives ever had problems with anesthesia? No   14. Do you have sleep apnea, excessive snoring or daytime drowsiness? No   15. Do you have any artifical heart valves or other implanted medical devices like a pacemaker, defibrillator, or continuous glucose monitor? No   16. Do you have artificial joints? No   17. Are you allergic to latex? No       Health Care Directive:  Patient has a Health Care Directive on file      Preoperative Review of :   reviewed - no record of controlled substances prescribed.      Status of Chronic Conditions:  RENAL INSUFFICIENCY s/p kidney donation - Patient has a longstanding history of moderate-severe chronic renal insufficiency. Last Cr 1.42.        Review of Systems  CONSTITUTIONAL: NEGATIVE for fever, chills, change in weight  INTEGUMENTARY/SKIN: NEGATIVE for worrisome rashes, moles or lesions  EYES: NEGATIVE for vision changes or irritation  ENT/MOUTH: NEGATIVE for ear, mouth and throat problems  RESP: NEGATIVE for significant cough or SOB  CV: NEGATIVE for chest pain, palpitations or peripheral edema  GI: NEGATIVE for nausea, abdominal pain, heartburn, or change in bowel habits  : NEGATIVE  for frequency, dysuria, or hematuria  MUSCULOSKELETAL: NEGATIVE for significant arthralgias or myalgia  NEURO: NEGATIVE for weakness, dizziness or paresthesias  ENDOCRINE: NEGATIVE for temperature intolerance, skin/hair changes  HEME: NEGATIVE for bleeding problems  PSYCHIATRIC: NEGATIVE for changes in mood or affect    Patient Active Problem List    Diagnosis Date Noted     Dysfunction of left eustachian tube 05/02/2023     Priority: Medium     Added automatically from request for surgery [id number]       Hx of adenomatous polyp of colon 10/08/2019     Priority: Medium     Labral tear of shoulder, left, subsequent encounter 08/10/2018     Priority: Medium     Shoulder instability, left 08/10/2018     Priority: Medium     Stage 3a chronic kidney disease (H) 04/25/2018     Priority: Medium     Hyperlipidemia LDL goal <100 04/24/2018     Priority: Medium     Overweight (BMI 25.0-29.9) 04/24/2018     Priority: Medium     ACP (advance care planning) 12/21/2016     Priority: Medium     Advance Care Planning 12/21/2016: Receipt of ACP document:  Received: Health Care Directive which was witnessed or notarized on 10-12-16.  Document previously scanned on 10-25-16.  Validation form completed and sent to be scanned.  Code Status reflects choices in most recent ACP document.  Confirmed/documented designated decision maker(s).  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    donated left kidney to brother     Hearing loss     Left     Hip dislocation 85    left     Hyperlipidemia  LDL goal <100 4/24/2018     Shoulder dislocation 97    left     Tinnitus      Past Surgical History:   Procedure Laterality Date     ARTHROSCOPY SHOULDER, OPEN BICEP TENODESIS REPAIR, COMBINED Left 11/16/2018    Procedure: Left Shoulder Diagnostic Arthroscopy, Labral Debridement, Open Biceps Tenodesis;  Surgeon: Anai Mata MD;  Location:  OR     ENT SURGERY  multiple times    pe tubes     ENT SURGERY      tonsils     ENT SURGERY  11/2011    Tubes 5 times since last time 10/21/14     GENITOURINARY SURGERY  Oct. 2013    Vasectomy     LAPAROSCOPIC DONOR HAND ASSISTED KIDNEY LIVING RELATED N/A 10/13/2016    Procedure: LAPAROSCOPIC DONOR HAND ASSISTED KIDNEY LIVING RELATED;  Surgeon: Tigre Quarles MD;  Location: [facility] OR     LASER DIODE TYMPANOMASTOIDECTOMY Left 3/31/2015    Procedure: LASER DIODE TYMPANOMASTOIDECTOMY;  Surgeon: Yaneth Rogel MD;  Location: [facility] OR     LASER DIODE TYMPANOTOMY Left 6/12/2019    Procedure: Laser Myringotomy with Diode Laser and T-tube Placement;  Surgeon: Yaneth Rogel MD;  Location:  OR     ORTHOPEDIC SURGERY  child    dislocated hip     OSSICULAR CHAIN RECONSTRUCTION N/A 10/9/2015    Procedure: OSSICULAR CHAIN RECONSTRUCTION;  Surgeon: Yaneth Rogel MD;  Location: [facility] OR     TYMPANOPLASTY Left 10/9/2015    Procedure: TYMPANOPLASTY;  Surgeon: Yaneth Rogel MD;  Location:  OR     Current Outpatient Medications   Medication Sig Dispense Refill     fluticasone (FLONASE) 50 MCG/ACT nasal spray INSTILL 1 SPRAY INTO BOTH NOSTRILS DAILY 16 mL 1     prednisoLONE acetate (PRED FORTE) 1 % ophthalmic suspension Place 4 drops Into the left ear 2 times daily Instill 3 drops in affected ear 3 times daily 10 mL 0     simvastatin (ZOCOR) 20 MG tablet Take 1 tablet (20 mg) by mouth At Bedtime 90 tablet 3     triamcinolone (KENALOG) 0.1 % external ointment Apply sparingly to affected area three times a day 80 g 2       Allergies   Allergen Reactions     Seasonal Allergies      "    Social History     Tobacco Use     Smoking status: Never     Smokeless tobacco: Never   Vaping Use     Vaping status: Never Used   Substance Use Topics     Alcohol use: Yes     Alcohol/week: 1.0 standard drink of alcohol     Comment: once per week with a meal     Family History   Problem Relation Age of Onset     Gastrointestinal Disease Mother         colostomy for diverticulitis     Cerebrovascular Disease Mother         after giving birth     Kidney Disease Brother         dysplasia     Genitourinary Problems Brother      Cardiovascular Brother         ablation     Hypertension Brother      Heart Disease Father      Genitourinary Problems Father      Diabetes Type 2  Father         obese     Cancer Paternal Grandfather         bone cancer     Aneurysm Paternal Uncle         brain aneurysm on coumadin     Hypertension Brother      History   Drug Use No         Objective     /70 (BP Location: Left arm, Patient Position: Sitting, Cuff Size: Adult Regular)   Pulse 65   Resp 19   Ht 1.803 m (5' 11\")   Wt 98 kg (216 lb)   SpO2 100%   BMI 30.13 kg/m      Physical Exam    GENERAL APPEARANCE: healthy, alert and no distress     EYES: EOMI,  PERRL     HENT: ear canals and TM's normal and nose and mouth without ulcers or lesions     NECK: no adenopathy, no asymmetry, masses, or scars and thyroid normal to palpation     RESP: lungs clear to auscultation - no rales, rhonchi or wheezes     CV: regular rates and rhythm, normal S1 S2,  or rub     ABDOMEN:  soft, nontender, no HSM or masses and bowel sounds normal     MS: extremities normal- no gross deformities noted, no evidence of inflammation in joints, FROM in all extremities.     SKIN: no suspicious lesions or rashes     NEURO: Normal strength and tone, sensory exam grossly normal, mentation intact and speech normal     PSYCH: mentation appears normal. and affect normal/bright     LYMPHATICS: No cervical adenopathy        Diagnostics:  Recent Results (from " the past 24 hour(s))   BASIC METABOLIC PANEL    Collection Time: 05/04/23  3:21 PM   Result Value Ref Range    Sodium 140 136 - 145 mmol/L    Potassium 4.9 3.4 - 5.3 mmol/L    Chloride 105 98 - 107 mmol/L    Carbon Dioxide (CO2) 27 22 - 29 mmol/L    Anion Gap 8 7 - 15 mmol/L    Urea Nitrogen 19.5 6.0 - 20.0 mg/dL    Creatinine 1.44 (H) 0.67 - 1.17 mg/dL    Calcium 9.1 8.6 - 10.0 mg/dL    Glucose 88 70 - 99 mg/dL    GFR Estimate 58 (L) >60 mL/min/1.73m2   Hemoglobin    Collection Time: 05/04/23  3:21 PM   Result Value Ref Range    Hemoglobin 14.1 13.3 - 17.7 g/dL      No EKG required, no history of coronary heart disease, significant arrhythmia, peripheral arterial disease or other structural heart disease.    Revised Cardiac Risk Index (RCRI):  The patient has the following serious cardiovascular risks for perioperative complications:   - No serious cardiac risks = 0 points     RCRI Interpretation: 0 points: Class I (very low risk - 0.4% complication rate)           Signed Electronically by: Imani Francisco PA-C  Copy of this evaluation report is provided to requesting physician.

## 2023-05-04 NOTE — H&P (VIEW-ONLY)
[facility]  [address] [address]  Phone: [phone]  Fax: [phone]  Primary Provider: To Whitt  Pre-op Performing Provider: CAITLIN ROBERTSON      PREOPERATIVE EVALUATION:  Today's date: 5/4/2023    Suhail Mustafa is a 52 year old male who presents for a preoperative evaluation.       View : No data to display.              Surgical Information:  Surgery/Procedure: COMBINED MYRINGOTOMY, INSERT TUBE left  Sur[place] Location: [facility] [place]  Surgeon: Yaneth Rogel MD  Surgery Date: 5/8/23  Time of Surgery: 9:45 am  Where patient plans to recover: At home with family  Fax number for surgical facility: Note does not need to be faxed, will be available electronically in Epic.    Assessment & Plan     The proposed surgical procedure is considered LOW risk.    Stage 3a chronic kidney disease (H)  S/p kidney donation.  CKD stable  - BASIC METABOLIC PANEL; Future  - Hemoglobin; Future    Preop general physical exam    Dysfunction of left eustachian tube          - No identified additional risk factors other than previously addressed    Antiplatelet or Anticoagulation Medication Instructions:   - Patient is on no antiplatelet or anticoagulation medications.    Additional Medication Instructions:   - Statins: Continue taking on the day of surgery.     RECOMMENDATION:  APPROVAL GIVEN to proceed with proposed procedure, without further diagnostic evaluation.    956}    Subjective     HPI related to upcoming procedure: left ear pain, had myringotomy tube that came out and is having pain, will have another tube placed.         5/4/2023     2:44 PM   Preop Questions   1. Have you ever had a heart attack or stroke? No   2. Have you ever had surgery on your heart or blood vessels, such as a stent placement, a coronary artery bypass, or surgery on an artery in your head, neck, heart, or legs? No   3. Do you have chest pain with activity? No   4. Do you have a history of  heart  failure? No   5. Do you currently have a cold, bronchitis or symptoms of other infection? No   6. Do you have a cough, shortness of breath, or wheezing? No   7. Do you or anyone in your family have previous history of blood clots? No   8. Do you or does anyone in your family have a serious bleeding problem such as prolonged bleeding following surgeries or cuts? No   9. Have you ever had problems with anemia or been told to take iron pills? YES    10. Have you had any abnormal blood loss such as black, tarry or bloody stools? No   11. Have you ever had a blood transfusion? No   12. Are you willing to have a blood transfusion if it is medically needed before, during, or after your surgery? Yes- donates blood frequently, has had low hgb a couple of years ago   13. Have you or any of your relatives ever had problems with anesthesia? No   14. Do you have sleep apnea, excessive snoring or daytime drowsiness? No   15. Do you have any artifical heart valves or other implanted medical devices like a pacemaker, defibrillator, or continuous glucose monitor? No   16. Do you have artificial joints? No   17. Are you allergic to latex? No       Health Care Directive:  Patient has a Health Care Directive on file      Preoperative Review of :   reviewed - no record of controlled substances prescribed.      Status of Chronic Conditions:  RENAL INSUFFICIENCY s/p kidney donation - Patient has a longstanding history of moderate-severe chronic renal insufficiency. Last Cr 1.42.        Review of Systems  CONSTITUTIONAL: NEGATIVE for fever, chills, change in weight  INTEGUMENTARY/SKIN: NEGATIVE for worrisome rashes, moles or lesions  EYES: NEGATIVE for vision changes or irritation  ENT/MOUTH: NEGATIVE for ear, mouth and throat problems  RESP: NEGATIVE for significant cough or SOB  CV: NEGATIVE for chest pain, palpitations or peripheral edema  GI: NEGATIVE for nausea, abdominal pain, heartburn, or change in bowel habits  : NEGATIVE  for frequency, dysuria, or hematuria  MUSCULOSKELETAL: NEGATIVE for significant arthralgias or myalgia  NEURO: NEGATIVE for weakness, dizziness or paresthesias  ENDOCRINE: NEGATIVE for temperature intolerance, skin/hair changes  HEME: NEGATIVE for bleeding problems  PSYCHIATRIC: NEGATIVE for changes in mood or affect    Patient Active Problem List    Diagnosis Date Noted     Dysfunction of left eustachian tube 05/02/2023     Priority: Medium     Added automatically from request for surgery [id number]       Hx of adenomatous polyp of colon 10/08/2019     Priority: Medium     Labral tear of shoulder, left, subsequent encounter 08/10/2018     Priority: Medium     Shoulder instability, left 08/10/2018     Priority: Medium     Stage 3a chronic kidney disease (H) 04/25/2018     Priority: Medium     Hyperlipidemia LDL goal <100 04/24/2018     Priority: Medium     Overweight (BMI 25.0-29.9) 04/24/2018     Priority: Medium     ACP (advance care planning) 12/21/2016     Priority: Medium     Advance Care Planning 12/21/2016: Receipt of ACP document:  Received: Health Care Directive which was witnessed or notarized on 10-12-16.  Document previously scanned on 10-25-16.  Validation form completed and sent to be scanned.  Code Status reflects choices in most recent ACP document.  Confirmed/documented designated decision maker(s).  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    donated left kidney to brother     Hearing loss     Left     Hip dislocation 85    left     Hyperlipidemia  LDL goal <100 4/24/2018     Shoulder dislocation 97    left     Tinnitus      Past Surgical History:   Procedure Laterality Date     ARTHROSCOPY SHOULDER, OPEN BICEP TENODESIS REPAIR, COMBINED Left 11/16/2018    Procedure: Left Shoulder Diagnostic Arthroscopy, Labral Debridement, Open Biceps Tenodesis;  Surgeon: Anai Mata MD;  Location:  OR     ENT SURGERY  multiple times    pe tubes     ENT SURGERY      tonsils     ENT SURGERY  11/2011    Tubes 5 times since last time 10/21/14     GENITOURINARY SURGERY  Oct. 2013    Vasectomy     LAPAROSCOPIC DONOR HAND ASSISTED KIDNEY LIVING RELATED N/A 10/13/2016    Procedure: LAPAROSCOPIC DONOR HAND ASSISTED KIDNEY LIVING RELATED;  Surgeon: Tigre Quarles MD;  Location: [facility] OR     LASER DIODE TYMPANOMASTOIDECTOMY Left 3/31/2015    Procedure: LASER DIODE TYMPANOMASTOIDECTOMY;  Surgeon: Yaneth Rogel MD;  Location: [facility] OR     LASER DIODE TYMPANOTOMY Left 6/12/2019    Procedure: Laser Myringotomy with Diode Laser and T-tube Placement;  Surgeon: Yaneth Rogel MD;  Location:  OR     ORTHOPEDIC SURGERY  child    dislocated hip     OSSICULAR CHAIN RECONSTRUCTION N/A 10/9/2015    Procedure: OSSICULAR CHAIN RECONSTRUCTION;  Surgeon: Yaneth Rogel MD;  Location: [facility] OR     TYMPANOPLASTY Left 10/9/2015    Procedure: TYMPANOPLASTY;  Surgeon: Yaneth Rogel MD;  Location:  OR     Current Outpatient Medications   Medication Sig Dispense Refill     fluticasone (FLONASE) 50 MCG/ACT nasal spray INSTILL 1 SPRAY INTO BOTH NOSTRILS DAILY 16 mL 1     prednisoLONE acetate (PRED FORTE) 1 % ophthalmic suspension Place 4 drops Into the left ear 2 times daily Instill 3 drops in affected ear 3 times daily 10 mL 0     simvastatin (ZOCOR) 20 MG tablet Take 1 tablet (20 mg) by mouth At Bedtime 90 tablet 3     triamcinolone (KENALOG) 0.1 % external ointment Apply sparingly to affected area three times a day 80 g 2       Allergies   Allergen Reactions     Seasonal Allergies      "    Social History     Tobacco Use     Smoking status: Never     Smokeless tobacco: Never   Vaping Use     Vaping status: Never Used   Substance Use Topics     Alcohol use: Yes     Alcohol/week: 1.0 standard drink of alcohol     Comment: once per week with a meal     Family History   Problem Relation Age of Onset     Gastrointestinal Disease Mother         colostomy for diverticulitis     Cerebrovascular Disease Mother         after giving birth     Kidney Disease Brother         dysplasia     Genitourinary Problems Brother      Cardiovascular Brother         ablation     Hypertension Brother      Heart Disease Father      Genitourinary Problems Father      Diabetes Type 2  Father         obese     Cancer Paternal Grandfather         bone cancer     Aneurysm Paternal Uncle         brain aneurysm on coumadin     Hypertension Brother      History   Drug Use No         Objective     /70 (BP Location: Left arm, Patient Position: Sitting, Cuff Size: Adult Regular)   Pulse 65   Resp 19   Ht 1.803 m (5' 11\")   Wt 98 kg (216 lb)   SpO2 100%   BMI 30.13 kg/m      Physical Exam    GENERAL APPEARANCE: healthy, alert and no distress     EYES: EOMI,  PERRL     HENT: ear canals and TM's normal and nose and mouth without ulcers or lesions     NECK: no adenopathy, no asymmetry, masses, or scars and thyroid normal to palpation     RESP: lungs clear to auscultation - no rales, rhonchi or wheezes     CV: regular rates and rhythm, normal S1 S2,  or rub     ABDOMEN:  soft, nontender, no HSM or masses and bowel sounds normal     MS: extremities normal- no gross deformities noted, no evidence of inflammation in joints, FROM in all extremities.     SKIN: no suspicious lesions or rashes     NEURO: Normal strength and tone, sensory exam grossly normal, mentation intact and speech normal     PSYCH: mentation appears normal. and affect normal/bright     LYMPHATICS: No cervical adenopathy        Diagnostics:  Recent Results (from " the past 24 hour(s))   BASIC METABOLIC PANEL    Collection Time: 05/04/23  3:21 PM   Result Value Ref Range    Sodium 140 136 - 145 mmol/L    Potassium 4.9 3.4 - 5.3 mmol/L    Chloride 105 98 - 107 mmol/L    Carbon Dioxide (CO2) 27 22 - 29 mmol/L    Anion Gap 8 7 - 15 mmol/L    Urea Nitrogen 19.5 6.0 - 20.0 mg/dL    Creatinine 1.44 (H) 0.67 - 1.17 mg/dL    Calcium 9.1 8.6 - 10.0 mg/dL    Glucose 88 70 - 99 mg/dL    GFR Estimate 58 (L) >60 mL/min/1.73m2   Hemoglobin    Collection Time: 05/04/23  3:21 PM   Result Value Ref Range    Hemoglobin 14.1 13.3 - 17.7 g/dL      No EKG required, no history of coronary heart disease, significant arrhythmia, peripheral arterial disease or other structural heart disease.    Revised Cardiac Risk Index (RCRI):  The patient has the following serious cardiovascular risks for perioperative complications:   - No serious cardiac risks = 0 points     RCRI Interpretation: 0 points: Class I (very low risk - 0.4% complication rate)           Signed Electronically by: Imani Francisco PA-C  Copy of this evaluation report is provided to requesting physician.

## 2023-05-05 ENCOUNTER — ANESTHESIA EVENT (OUTPATIENT)
Dept: SURGERY | Facility: AMBULATORY SURGERY CENTER | Age: 53
End: 2023-05-05
Payer: COMMERCIAL

## 2023-05-08 ENCOUNTER — HOSPITAL ENCOUNTER (OUTPATIENT)
Facility: AMBULATORY SURGERY CENTER | Age: 53
Discharge: HOME | End: 2023-05-08
Attending: OTOLARYNGOLOGY
Payer: COMMERCIAL

## 2023-05-08 ENCOUNTER — ANESTHESIA (OUTPATIENT)
Dept: SURGERY | Facility: AMBULATORY SURGERY CENTER | Age: 53
End: 2023-05-08
Payer: COMMERCIAL

## 2023-05-08 VITALS
WEIGHT: 216 LBS | OXYGEN SATURATION: 93 % | SYSTOLIC BLOOD PRESSURE: 100 MMHG | TEMPERATURE: 97.5 F | HEART RATE: 50 BPM | BODY MASS INDEX: 30.24 KG/M2 | RESPIRATION RATE: 13 BRPM | HEIGHT: 71 IN | DIASTOLIC BLOOD PRESSURE: 71 MMHG

## 2023-05-08 DIAGNOSIS — H69.92 DYSFUNCTION OF LEFT EUSTACHIAN TUBE: ICD-10-CM

## 2023-05-08 PROCEDURE — 69436 CREATE EARDRUM OPENING: CPT | Mod: LT

## 2023-05-08 PROCEDURE — 69436 CREATE EARDRUM OPENING: CPT | Mod: LT | Performed by: OTOLARYNGOLOGY

## 2023-05-08 DEVICE — TUBE EAR GOODE T SIL 10-16010: Type: IMPLANTABLE DEVICE | Site: EAR | Status: FUNCTIONAL

## 2023-05-08 RX ORDER — LIDOCAINE HYDROCHLORIDE 20 MG/ML
INJECTION, SOLUTION INFILTRATION; PERINEURAL PRN
Status: DISCONTINUED | OUTPATIENT
Start: 2023-05-08 | End: 2023-05-08

## 2023-05-08 RX ORDER — FENTANYL CITRATE 50 UG/ML
50 INJECTION, SOLUTION INTRAMUSCULAR; INTRAVENOUS EVERY 5 MIN PRN
Status: DISCONTINUED | OUTPATIENT
Start: 2023-05-08 | End: 2023-05-09 | Stop reason: HOSPADM

## 2023-05-08 RX ORDER — SODIUM CHLORIDE, SODIUM LACTATE, POTASSIUM CHLORIDE, CALCIUM CHLORIDE 600; 310; 30; 20 MG/100ML; MG/100ML; MG/100ML; MG/100ML
INJECTION, SOLUTION INTRAVENOUS CONTINUOUS
Status: DISCONTINUED | OUTPATIENT
Start: 2023-05-08 | End: 2023-05-09 | Stop reason: HOSPADM

## 2023-05-08 RX ORDER — FENTANYL CITRATE 50 UG/ML
INJECTION, SOLUTION INTRAMUSCULAR; INTRAVENOUS PRN
Status: DISCONTINUED | OUTPATIENT
Start: 2023-05-08 | End: 2023-05-08

## 2023-05-08 RX ORDER — ACETAMINOPHEN 325 MG/1
975 TABLET ORAL ONCE
Status: COMPLETED | OUTPATIENT
Start: 2023-05-08 | End: 2023-05-08

## 2023-05-08 RX ORDER — ONDANSETRON 4 MG/1
4 TABLET, ORALLY DISINTEGRATING ORAL EVERY 30 MIN PRN
Status: DISCONTINUED | OUTPATIENT
Start: 2023-05-08 | End: 2023-05-09 | Stop reason: HOSPADM

## 2023-05-08 RX ORDER — OXYCODONE HYDROCHLORIDE 5 MG/1
5 TABLET ORAL
Status: DISCONTINUED | OUTPATIENT
Start: 2023-05-08 | End: 2023-05-09 | Stop reason: HOSPADM

## 2023-05-08 RX ORDER — DEXAMETHASONE SODIUM PHOSPHATE 4 MG/ML
INJECTION, SOLUTION INTRA-ARTICULAR; INTRALESIONAL; INTRAMUSCULAR; INTRAVENOUS; SOFT TISSUE PRN
Status: DISCONTINUED | OUTPATIENT
Start: 2023-05-08 | End: 2023-05-08

## 2023-05-08 RX ORDER — ACETAMINOPHEN 325 MG/1
975 TABLET ORAL ONCE
Status: DISCONTINUED | OUTPATIENT
Start: 2023-05-08 | End: 2023-05-08 | Stop reason: HOSPADM

## 2023-05-08 RX ORDER — SODIUM CHLORIDE, SODIUM LACTATE, POTASSIUM CHLORIDE, CALCIUM CHLORIDE 600; 310; 30; 20 MG/100ML; MG/100ML; MG/100ML; MG/100ML
INJECTION, SOLUTION INTRAVENOUS CONTINUOUS PRN
Status: DISCONTINUED | OUTPATIENT
Start: 2023-05-08 | End: 2023-05-08

## 2023-05-08 RX ORDER — GABAPENTIN 300 MG/1
300 CAPSULE ORAL
Status: COMPLETED | OUTPATIENT
Start: 2023-05-08 | End: 2023-05-08

## 2023-05-08 RX ORDER — SODIUM CHLORIDE, SODIUM LACTATE, POTASSIUM CHLORIDE, CALCIUM CHLORIDE 600; 310; 30; 20 MG/100ML; MG/100ML; MG/100ML; MG/100ML
INJECTION, SOLUTION INTRAVENOUS CONTINUOUS
Status: DISCONTINUED | OUTPATIENT
Start: 2023-05-08 | End: 2023-05-08 | Stop reason: HOSPADM

## 2023-05-08 RX ORDER — ACETAMINOPHEN 325 MG/1
650 TABLET ORAL
Status: DISCONTINUED | OUTPATIENT
Start: 2023-05-08 | End: 2023-05-09 | Stop reason: HOSPADM

## 2023-05-08 RX ORDER — CIPROFLOXACIN AND DEXAMETHASONE 3; 1 MG/ML; MG/ML
5 SUSPENSION/ DROPS AURICULAR (OTIC) 2 TIMES DAILY
Qty: 5 ML | Refills: 0 | Status: SHIPPED | OUTPATIENT
Start: 2023-05-08 | End: 2023-05-13

## 2023-05-08 RX ORDER — PROPOFOL 10 MG/ML
INJECTION, EMULSION INTRAVENOUS CONTINUOUS PRN
Status: DISCONTINUED | OUTPATIENT
Start: 2023-05-08 | End: 2023-05-08

## 2023-05-08 RX ORDER — ONDANSETRON 2 MG/ML
INJECTION INTRAMUSCULAR; INTRAVENOUS PRN
Status: DISCONTINUED | OUTPATIENT
Start: 2023-05-08 | End: 2023-05-08

## 2023-05-08 RX ORDER — ONDANSETRON 2 MG/ML
4 INJECTION INTRAMUSCULAR; INTRAVENOUS EVERY 30 MIN PRN
Status: DISCONTINUED | OUTPATIENT
Start: 2023-05-08 | End: 2023-05-09 | Stop reason: HOSPADM

## 2023-05-08 RX ORDER — FENTANYL CITRATE 50 UG/ML
25 INJECTION, SOLUTION INTRAMUSCULAR; INTRAVENOUS EVERY 5 MIN PRN
Status: DISCONTINUED | OUTPATIENT
Start: 2023-05-08 | End: 2023-05-09 | Stop reason: HOSPADM

## 2023-05-08 RX ORDER — PROPOFOL 10 MG/ML
INJECTION, EMULSION INTRAVENOUS PRN
Status: DISCONTINUED | OUTPATIENT
Start: 2023-05-08 | End: 2023-05-08

## 2023-05-08 RX ORDER — LIDOCAINE 40 MG/G
CREAM TOPICAL
Status: DISCONTINUED | OUTPATIENT
Start: 2023-05-08 | End: 2023-05-08 | Stop reason: HOSPADM

## 2023-05-08 RX ORDER — HYDROMORPHONE HYDROCHLORIDE 1 MG/ML
0.4 INJECTION, SOLUTION INTRAMUSCULAR; INTRAVENOUS; SUBCUTANEOUS EVERY 5 MIN PRN
Status: DISCONTINUED | OUTPATIENT
Start: 2023-05-08 | End: 2023-05-09 | Stop reason: HOSPADM

## 2023-05-08 RX ORDER — HYDROMORPHONE HYDROCHLORIDE 1 MG/ML
0.2 INJECTION, SOLUTION INTRAMUSCULAR; INTRAVENOUS; SUBCUTANEOUS EVERY 5 MIN PRN
Status: DISCONTINUED | OUTPATIENT
Start: 2023-05-08 | End: 2023-05-09 | Stop reason: HOSPADM

## 2023-05-08 RX ADMIN — FENTANYL CITRATE 25 MCG: 50 INJECTION, SOLUTION INTRAMUSCULAR; INTRAVENOUS at 09:37

## 2023-05-08 RX ADMIN — FENTANYL CITRATE 25 MCG: 50 INJECTION, SOLUTION INTRAMUSCULAR; INTRAVENOUS at 09:43

## 2023-05-08 RX ADMIN — SODIUM CHLORIDE, SODIUM LACTATE, POTASSIUM CHLORIDE, CALCIUM CHLORIDE: 600; 310; 30; 20 INJECTION, SOLUTION INTRAVENOUS at 08:55

## 2023-05-08 RX ADMIN — SODIUM CHLORIDE, SODIUM LACTATE, POTASSIUM CHLORIDE, CALCIUM CHLORIDE: 600; 310; 30; 20 INJECTION, SOLUTION INTRAVENOUS at 09:28

## 2023-05-08 RX ADMIN — ACETAMINOPHEN 975 MG: 325 TABLET ORAL at 08:31

## 2023-05-08 RX ADMIN — PROPOFOL 30 MG: 10 INJECTION, EMULSION INTRAVENOUS at 09:45

## 2023-05-08 RX ADMIN — PROPOFOL 200 MG: 10 INJECTION, EMULSION INTRAVENOUS at 09:33

## 2023-05-08 RX ADMIN — DEXAMETHASONE SODIUM PHOSPHATE 4 MG: 4 INJECTION, SOLUTION INTRA-ARTICULAR; INTRALESIONAL; INTRAMUSCULAR; INTRAVENOUS; SOFT TISSUE at 09:37

## 2023-05-08 RX ADMIN — GABAPENTIN 300 MG: 300 CAPSULE ORAL at 08:31

## 2023-05-08 RX ADMIN — PROPOFOL 150 MCG/KG/MIN: 10 INJECTION, EMULSION INTRAVENOUS at 09:35

## 2023-05-08 RX ADMIN — LIDOCAINE HYDROCHLORIDE 80 MG: 20 INJECTION, SOLUTION INFILTRATION; PERINEURAL at 09:33

## 2023-05-08 RX ADMIN — ONDANSETRON 4 MG: 2 INJECTION INTRAMUSCULAR; INTRAVENOUS at 09:37

## 2023-05-08 NOTE — INTERVAL H&P NOTE
"I have reviewed the surgical (or preoperative) H&P that is linked to this encounter, and examined the patient. There are no significant changes    Clinical Conditions Present on Arrival:  Clinically Significant Risk Factors Present on Admission                  # Obesity: Estimated body mass index is 30.13 kg/m  as calculated from the following:    Height as of this encounter: 1.803 m (5' 11\").    Weight as of this encounter: 98 kg (216 lb).       "

## 2023-05-08 NOTE — DISCHARGE INSTRUCTIONS
1.  Dry ear precautions for 1 week after surgery.    2.  After 1 week, may put head underwater without ear plugs if the water is chlorinated.  Otherwise, keep ears dry with ear plugs/headband.    3.  Ciprodex drops 5 drops twice a day to the left ears for 7 days.    4.  Tylenol as needed for pain.    5.  Follow up as scheduled in 4-6 weeks for postoperative audiogram and check.    6.  If you have any questions, please call the Pediatric ENT clinic during daytime hours or call the  and ask for the ENT resident on call during evening/weekend hours.     [facility]  Home Care Following Anesthesia  For 24 hours after surgery:  Get plenty of rest.  A responsible adult must stay with you for at least 24 hours after you leave the surgery center.  Do not drive or use heavy equipment.  If you have weakness or tingling, don't drive or use heavy equipment until this feeling goes away.   Do not drink alcohol.   Avoid strenuous or risky activities.  Ask for help when climbing stairs.  You may feel lightheaded.  IF so, sit for a few minutes before standing.  Have someone help you get up.   If you have nausea (feel sick to your stomach): Drink only clear liquids such as apple juice, ginger ale, broth or 7-Up.  Rest may also help.  Be sure to drink enough fluids.  Move to a regular diet as you feel able.   You may have a slight fever.  Call the doctor if your fever is over 100 F (37.7 C) (taken under the tongue) or lasts longer than 24 hours.  You may have a dry mouth, a sore throat, muscle aches or trouble sleeping. These should go away after 24 hours.  Do not make important or legal decisions.   It is recommended to avoid smoking.               Tips for taking pain medications  To get the best pain relief possible, remember these points:  Take pain medications as directed, before pain becomes severe.  Pain medication can upset your stomach: taking it with food may help.  Constipation  is a common side effect of pain medication. Drink plenty of  fluids.  Eat foods high in fiber. Take a stool softener if recommended by your doctor or pharmacist.  Do not drink alcohol, drive or operate machinery while taking pain medications.  Ask about other ways to control pain, such as with heat, ice or relaxation.    Tylenol/Acetaminophen Consumption  To help encourage the safe use of acetaminophen, the makers of TYLENOL  have lowered the maximum daily dose for single-ingredient Extra Strength TYLENOL  (acetaminophen) products sold in the [place] from 8 pills per day (4,000 mg) to 6 pills per day (3,000 mg). The dosing interval has also changed from 2 pills every 4-6 hours to 2 pills every 6 hours.  If you feel your pain relief is insufficient, you may take Tylenol/Acetaminophen in addition to your narcotic pain medication.   Be careful not to exceed 3,000 mg of Tylenol/Acetaminophen in a 24 hour period from all sources.  If you are taking extra strength Tylenol/acetaminophen (500 mg), the maximum dose is 6 tablets in 24 hours.  If you are taking regular strength acetaminophen (325 mg), the maximum dose is 9 tablets in 24 hours.    Call a doctor for any of the following:  Signs of infection (fever, growing tenderness at the surgery site, a large amount of drainage or bleeding, severe pain, foul-smelling drainage, redness, swelling).  It has been over 8 to 10 hours since surgery and you are still not able to urinate (pass water).  Headache for over 24 hours.  Numbness, tingling or weakness the day after surgery (if you had spinal anesthesia).  Signs of Covid-19 infection (temperature over 100 degrees, shortness of breath, cough, loss of taste/smell, generalized body aches, persistent headache, chills, sore throat, nausea/vomiting/diarrhea)  Your doctor is:  Dr. Yaneth Rogel, ENT Otolaryngology: [phone]                    Or dial [phone] and ask for the resident on call for:  ENT Otolaryngology  For emergency  care, call the:  [facility]:  [phone] (TTY for hearing impaired: [phone])                   room air

## 2023-05-08 NOTE — ANESTHESIA PREPROCEDURE EVALUATION
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 2013    Vasectomy     LAPAROSCOPIC DONOR HAND ASSISTED KIDNEY LIVING RELATED N/A 10/13/2016    Procedure: LAPAROSCOPIC DONOR HAND ASSISTED KIDNEY LIVING RELATED;  Surgeon: Tigre Quarles MD;  Location: [facility] OR     LASER DIODE TYMPANOMASTOIDECTOMY Left 3/31/2015    Procedure: LASER DIODE TYMPANOMASTOIDECTOMY;  Surgeon: Yaneth Rogel MD;  Location: [facility] OR     LASER DIODE TYMPANOTOMY Left 2019    Procedure: Laser Myringotomy with Diode Laser and T-tube Placement;  Surgeon: Yaneth Rogel MD;  Location:  OR     ORTHOPEDIC SURGERY  child    dislocated hip     OSSICULAR CHAIN RECONSTRUCTION N/A 10/9/2015    Procedure: OSSICULAR CHAIN RECONSTRUCTION;  Surgeon: Yaneth Rogel MD;  Location: [facility] OR     TYMPANOPLASTY Left 10/9/2015    Procedure: TYMPANOPLASTY;  Surgeon: Yaneth Rogel MD;  Location: [facility] OR      Allergies   Allergen Reactions     Seasonal Allergies       Social History     Tobacco Use     Smoking status: Never     Smokeless tobacco: Never    Vaping Use     Vaping status: Never Used   Substance Use Topics     Alcohol use: Yes     Alcohol/week: 1.0 standard drink of alcohol     Comment: once per week with a meal      Wt Readings from Last 1 Encounters:   05/08/23 98 kg (216 lb)        Anesthesia Evaluation   Pt has had prior anesthetic.     No history of anesthetic complications       ROS/MED HX  ENT/Pulmonary: Comment: Hearing loss      Neurologic:  - neg neurologic ROS     Cardiovascular:  - neg cardiovascular ROS     METS/Exercise Tolerance: >4 METS    Hematologic:  - neg hematologic  ROS     Musculoskeletal:   (+) arthritis,     GI/Hepatic:  - neg GI/hepatic ROS     Renal/Genitourinary:     (+) renal disease, Pt has history of transplant,     Endo:  - neg endo ROS     Psychiatric/Substance Use:  - neg psychiatric ROS     Infectious Disease:  - neg infectious disease ROS     Malignancy:  - neg malignancy ROS     Other:  - neg other ROS          Physical Exam    Airway  airway exam normal      Mallampati: II   TM distance: > 3 FB   Neck ROM: full   Mouth opening: > 3 cm    Respiratory Devices and Support         Dental       (+) Completely normal teeth      Cardiovascular   cardiovascular exam normal       Rhythm and rate: regular and normal     Pulmonary   pulmonary exam normal        breath sounds clear to auscultation           OUTSIDE LABS:  CBC:   Lab Results   Component Value Date    WBC 5.6 05/17/2022    WBC 5.5 01/05/2021    HGB 14.1 05/04/2023    HGB 14.9 05/17/2022    HCT 42.7 05/17/2022    HCT 44.0 01/05/2021     05/17/2022     01/05/2021     BMP:   Lab Results   Component Value Date     05/04/2023     05/17/2022    POTASSIUM 4.9 05/04/2023    POTASSIUM 4.2 05/17/2022    CHLORIDE 105 05/04/2023    CHLORIDE 105 05/17/2022    CO2 27 05/04/2023    CO2 28 05/17/2022    BUN 19.5 05/04/2023    BUN 11 05/17/2022    CR 1.44 (H) 05/04/2023    CR 1.42 (H) 05/17/2022    GLC 88 05/04/2023    GLC 97 05/17/2022     COAGS:   Lab  Results   Component Value Date    PTT 27 10/12/2016    INR 1.06 10/12/2016     POC: No results found for: BGM, HCG, HCGS  HEPATIC:   Lab Results   Component Value Date    ALBUMIN 3.8 08/04/2016    PROTTOTAL 7.1 08/04/2016    ALT 54 01/05/2021    AST 24 04/24/2018    ALKPHOS 51 08/04/2016    BILITOTAL 0.6 08/04/2016     OTHER:   Lab Results   Component Value Date    A1C 5.1 08/04/2016    ANA 9.1 05/04/2023    PHOS 2.1 (L) 10/12/2016       Anesthesia Plan    ASA Status:  2   NPO Status:  NPO Appropriate    Anesthesia Type: General.     - Airway: LMA   Induction: Intravenous, Propofol.   Maintenance: Balanced.        Consents    Anesthesia Plan(s) and associated risks, benefits, and realistic alternatives discussed. Questions answered and patient/representative(s) expressed understanding.    - Discussed:     - Discussed with:  Patient    Use of blood products discussed: No .     Postoperative Care    Pain management: Multi-modal analgesia, Oral pain medications.   PONV prophylaxis: Ondansetron (or other 5HT-3), Dexamethasone or Solumedrol     Comments:                Jose Hooker DO

## 2023-05-08 NOTE — OP NOTE
Date of service:  5/08/23    Preoperative diagnosis:  eustachian tube dysfunction     Postoperative diagnosis:  eustachian tube dysfunction     Procedure:  Left myringotomy and t-tube placement using the diode laser    Surgeon:  Yaneth Rogel MD    Anesthesia:  General    Complications:  None    EBL:  None    Specimens:  None    Findings:  Left with seruos effusion.     Indications:  Suhail Mustafa is a 52 year old male with eustachian tube dysfunction s/p prior surgery.  The above procedure was discussed with the patient and consent was obtained.    Procedure:  Patient was taken to the operating room and placed supine on the operating table.  After general anesthesia was induced and a LMA placed, Time Out was then performed with confirmation of the patient, site and procedure.  The operating microscope was brought into position and the left ear was examined.  Cerumen was removed.  The tympanic membrane was intact and thickcned.  Myringotomy incision was made with the diode laser at 500mW continuous. Serous effusion was suctioned from the middle ear.  T-tube was placed without difficulty and Floxin drops instilled into the ear canal.  The patient tolerated the procedure well with no complications.  Awakened and taken to the PACU in stable condition.

## 2023-05-08 NOTE — ANESTHESIA POSTPROCEDURE EVALUATION
Patient: Suhail Mustafa    Procedure: Procedure(s):  COMBINED MYRINGOTOMY, INSERT TUBE, using laser       Anesthesia Type:  General    Note:  Disposition: Outpatient   Postop Pain Control: Uneventful            Sign Out: Well controlled pain   PONV: No   Neuro/Psych: Uneventful            Sign Out: Acceptable/Baseline neuro status   Airway/Respiratory: Uneventful            Sign Out: Acceptable/Baseline resp. status   CV/Hemodynamics: Uneventful            Sign Out: Acceptable CV status; No obvious hypovolemia; No obvious fluid overload   Other NRE: NONE   DID A NON-ROUTINE EVENT OCCUR? No           Last vitals:  Vitals Value Taken Time   /65 05/08/23 1015   Temp 36.4  C (97.6  F) 05/08/23 1005   Pulse 67 05/08/23 1018   Resp 8 05/08/23 1018   SpO2 96 % 05/08/23 1012   Vitals shown include unvalidated device data.    Electronically Signed By: Jose Hooker DO  May 8, 2023  12:59 PM

## 2023-05-08 NOTE — ANESTHESIA CARE TRANSFER NOTE
Patient: Suhail Mustafa    Procedure: Procedure(s):  COMBINED MYRINGOTOMY, INSERT TUBE, using laser       Diagnosis: Dysfunction of left eustachian tube [H69.82]  Diagnosis Additional Information: No value filed.    Anesthesia Type:   General     Note:      Level of Consciousness: awake  Oxygen Supplementation: nasal cannula    Independent Airway: airway patency satisfactory and stable        Patient transferred to: PACU    Handoff Report: Identifed the Patient, Identified the Reponsible Provider, Reviewed the pertinent medical history, Discussed the surgical course, Reviewed Intra-OP anesthesia mangement and issues during anesthesia, Set expectations for post-procedure period and Allowed opportunity for questions and acknowledgement of understanding      Vitals:  Vitals Value Taken Time   BP     Temp     Pulse 60 05/08/23 1005   Resp 13 05/08/23 1005   SpO2     Vitals shown include unvalidated device data.    Electronically Signed By: FREEDOM Bailon CRNA  May 8, 2023  10:06 AM

## 2023-06-07 ENCOUNTER — OFFICE VISIT (OUTPATIENT)
Dept: AUDIOLOGY | Facility: CLINIC | Age: 53
End: 2023-06-07
Payer: COMMERCIAL

## 2023-06-07 DIAGNOSIS — H90.A32 MIXED CONDUCTIVE AND SENSORINEURAL HEARING LOSS OF LEFT EAR WITH RESTRICTED HEARING OF RIGHT EAR: ICD-10-CM

## 2023-06-07 DIAGNOSIS — H90.A21 SENSORINEURAL HEARING LOSS (SNHL) OF RIGHT EAR WITH RESTRICTED HEARING OF LEFT EAR: ICD-10-CM

## 2023-06-07 PROCEDURE — V5261 HEARING AID, DIGIT, BIN, BTE: HCPCS

## 2023-06-07 PROCEDURE — V5264 EAR MOLD/INSERT: HCPCS

## 2023-06-07 PROCEDURE — 92593 PR HEARING AID CHECK, BINAURAL: CPT

## 2023-06-07 PROCEDURE — V5020 CONFORMITY EVALUATION: HCPCS | Mod: RT

## 2023-06-07 PROCEDURE — V5160 DISPENSING FEE BINAURAL: HCPCS

## 2023-06-07 PROCEDURE — V5011 HEARING AID FITTING/CHECKING: HCPCS | Mod: RT

## 2023-06-07 NOTE — PATIENT INSTRUCTIONS

## 2023-06-07 NOTE — PROGRESS NOTES
AUDIOLOGY REPORT    SUBJECTIVE: Suhail Mustafa, a 52 year old male, was seen in the Audiology Clinic at [facility] today for a Binaural hearing aid fitting. Previous results have revealed a mixed hearing loss in the left ear and a sensorineural hearing loss in the right ear. The patient was given medical clearance to pursue amplification by  Yaneth Rogel MD. Suhail has never worn a RIGHT hearing aid before.       OBJECTIVE:  Prior to fitting, a hearing aid check was performed to ensure device functionality. The hearing aid conformity evaluation was completed.The hearing aids were placed and they provided a good fit. Real-ear-probe-microphone measurements were completed on the [employer] system and were a good match to NAL-NL2 target with soft sounds audible, moderate sounds comfortable, and loud sounds below discomfort. UCLs are verified through maximum power output measures and demonstrate appropriate limiting of loud inputs. Mr. Mustafa was oriented to proper hearing aid use, care, cleaning (no water, dry brush), batteries (size rechargeable, insertion/removal, toxicity, low-battery signal), aid insertion/removal, user booklet, warranty information, storage cases, and other hearing aid details. The patient confirmed understanding of hearing aid use and care, and showed proper insertion of hearing aid and batteries while in the office today. Mr. Mustafa reported good volume and sound quality today.    EAR(S) FIT: Binaural  HEARING AID MAKE: Right: Phonak; Left: Phonak    HEARING AID MODEL #: Right: Audeo L50-RL; Left: Audeo L50-RL  HEARING AID STYLE: Right: NELDA; Left: NELDA  DOME SIZE: Right:  Large open;   LENGTH: Right:  2M; Left:  2M  EARMOLDS: Left:  Silicone slimtip with canal lock SN: [id number]  SERIAL NUMBERS: Right: [id number]; Left: [id number]  WARRANTY END DATE: Right: 8/16/2026; Left:: 8/16/2026      (The patient Does Not Require a signed a waiver that is on file agreeing to the  upgrade charge, per their insurance contract. )    *Hearing aids connected to cellphone for both phone calls and geronimo use.         ASSESSMENT: Binaural hearing aid fitting completed today. Verification measures were performed. The 45 day trial period was explained to patient, and they expressed understanding. Mr. Mustafa signed the Hearing Aid Purchase Agreement and was given a copy, as well as details on his hearing aids. Patient was counseled that exact out of pocket amounts cannot be determined for hearing aid claims being sent to insurance. Any insurance coverage information presented to the patient is an estimate only, and is not a guarantee of payment. Patient has been advised to check with their own insurance.    PLAN: Mr. Mustafa will return for follow-up in 2-3 weeks for a hearing aid review appointment. Please call this clinic with questions regarding today s appointment.    Osiel Davis, CCC-A  Licensed Audiologist  [place] #[id number]      06/07/23

## 2023-06-15 ENCOUNTER — OFFICE VISIT (OUTPATIENT)
Dept: AUDIOLOGY | Facility: CLINIC | Age: 53
End: 2023-06-15
Payer: COMMERCIAL

## 2023-06-15 ENCOUNTER — OFFICE VISIT (OUTPATIENT)
Dept: OTOLARYNGOLOGY | Facility: CLINIC | Age: 53
End: 2023-06-15
Payer: COMMERCIAL

## 2023-06-15 VITALS
HEART RATE: 80 BPM | BODY MASS INDEX: 30.38 KG/M2 | OXYGEN SATURATION: 98 % | DIASTOLIC BLOOD PRESSURE: 81 MMHG | TEMPERATURE: 98.3 F | SYSTOLIC BLOOD PRESSURE: 126 MMHG | WEIGHT: 217 LBS | HEIGHT: 71 IN

## 2023-06-15 DIAGNOSIS — H90.72 MIXED CONDUCTIVE AND SENSORINEURAL HEARING LOSS OF LEFT EAR WITH UNRESTRICTED HEARING OF RIGHT EAR: ICD-10-CM

## 2023-06-15 DIAGNOSIS — H90.A21 SENSORINEURAL HEARING LOSS (SNHL) OF RIGHT EAR WITH RESTRICTED HEARING OF LEFT EAR: Primary | ICD-10-CM

## 2023-06-15 DIAGNOSIS — H90.A32 MIXED CONDUCTIVE AND SENSORINEURAL HEARING LOSS OF LEFT EAR WITH RESTRICTED HEARING OF RIGHT EAR: ICD-10-CM

## 2023-06-15 DIAGNOSIS — H69.92 DYSFUNCTION OF LEFT EUSTACHIAN TUBE: Primary | ICD-10-CM

## 2023-06-15 PROCEDURE — 92567 TYMPANOMETRY: CPT | Performed by: AUDIOLOGIST

## 2023-06-15 PROCEDURE — 92557 COMPREHENSIVE HEARING TEST: CPT | Performed by: AUDIOLOGIST

## 2023-06-15 PROCEDURE — 99213 OFFICE O/P EST LOW 20 MIN: CPT | Mod: 25 | Performed by: OTOLARYNGOLOGY

## 2023-06-15 PROCEDURE — 92504 EAR MICROSCOPY EXAMINATION: CPT | Performed by: OTOLARYNGOLOGY

## 2023-06-15 RX ORDER — FEXOFENADINE HCL 180 MG/1
180 TABLET ORAL DAILY
COMMUNITY

## 2023-06-15 ASSESSMENT — PAIN SCALES - GENERAL: PAINLEVEL: NO PAIN (0)

## 2023-06-15 NOTE — LETTER
6/15/2023      RE: Suhail Mustafa  [address]   [facility] [place] [id number]       Suhail Mustafa is seen for a left ear check after left laser myringotomy and t-tube placement. He reports no change in hearing and says that he only needs to use his drops once a week instead of every other day for moisture build up in the ear. No actual otorrhea, no otalgia.    Physical examination:  male in no acute distress.  Alert and answering questions appropriately.  HB 1/6 bilaterally.  Left ear examined under the microscope. T-tube in position in the inferior quadrant with a slight amount of redness on the TM at the base but no granulation tissue or otorrhea, middle ear visible through the lumen, wet cerumen suctioned from the canal, no retractions noted.    Audiogram:  Audiogram was independently reviewed and compared to his prior test. Right normal downsloping to mild/moderate sensorineural hearing loss, left moderate mixed loss with a primarily conductive component, 100% speech discrimination, normal right tympanogram, left flat oddly normal volume tympanogram. Stable hearing.    Assessment and plan:  Doing well. He now has a right hearing aid as well which he finds beneficial as an addition to the left hearing aid. We'll go back to 6 month checks.      Yaneth Rogel MD

## 2023-06-15 NOTE — PROGRESS NOTES
Suhail Mustafa is seen for a left ear check after left laser myringotomy and t-tube placement. He reports no change in hearing and says that he only needs to use his drops once a week instead of every other day for moisture build up in the ear. No actual otorrhea, no otalgia.    Physical examination:  male in no acute distress.  Alert and answering questions appropriately.  HB 1/6 bilaterally.  Left ear examined under the microscope. T-tube in position in the inferior quadrant with a slight amount of redness on the TM at the base but no granulation tissue or otorrhea, middle ear visible through the lumen, wet cerumen suctioned from the canal, no retractions noted.    Audiogram:  Audiogram was independently reviewed and compared to his prior test. Right normal downsloping to mild/moderate sensorineural hearing loss, left moderate mixed loss with a primarily conductive component, 100% speech discrimination, normal right tympanogram, left flat oddly normal volume tympanogram. Stable hearing.    Assessment and plan:  Doing well. He now has a right hearing aid as well which he finds beneficial as an addition to the left hearing aid. We'll go back to 6 month checks.

## 2023-06-15 NOTE — PROGRESS NOTES
AUDIOLOGY REPORT     SUMMARY: Audiology visit completed. See audiogram for results.       RECOMMENDATIONS: Follow-up with ENT.     Osiel Limon CCC-A  Licensed Audiologist   [place] #[id number]

## 2023-06-15 NOTE — NURSING NOTE
"Chief Complaint   Patient presents with     RECHECK     0     Blood pressure 126/81, pulse 80, temperature 98.3  F (36.8  C), height 1.803 m (5' 11\"), weight 98.4 kg (217 lb), SpO2 98 %.    Magdi Timmons LPN    "

## 2023-06-23 ENCOUNTER — OFFICE VISIT (OUTPATIENT)
Dept: AUDIOLOGY | Facility: CLINIC | Age: 53
End: 2023-06-23
Payer: COMMERCIAL

## 2023-06-23 DIAGNOSIS — H90.A32 MIXED CONDUCTIVE AND SENSORINEURAL HEARING LOSS OF LEFT EAR WITH RESTRICTED HEARING OF RIGHT EAR: ICD-10-CM

## 2023-06-23 DIAGNOSIS — H90.A21 SENSORINEURAL HEARING LOSS (SNHL) OF RIGHT EAR WITH RESTRICTED HEARING OF LEFT EAR: Primary | ICD-10-CM

## 2023-06-23 PROCEDURE — V5299 HEARING SERVICE: HCPCS

## 2023-06-23 NOTE — PROGRESS NOTES
AUDIOLOGY REPORT    SUBJECTIVE:Suhail Mustafa is a 52 year old male who was seen in the Audiology Clinic at the [facility] on 6/23/2023  for a follow-up check regarding the fitting of new hearing aids. Previous results have revealed a sensorineural hearing loss in the right ear and a mixed hearing loss in the left ear.  The patient has been seen previously in this clinic and was fit with Phonak Audeo L50-RL NELDA hearing aids on 6/7/2023.  Suhail reports good sound quality with the hearing aid(s). He would like them turned down     OBJECTIVE:   The International Outcome Inventory-Hearing Aids (IOI-HA) was administered today.The patient s responses to the 7 questions can be compared to normative data relative to how others are performing with their hearing aids, as well as focusing audiologic care and counseling.This patient s Quality of Life score (Question 7) was 4, which is at normative average.     Based on patient report, the following changes were made; hearing aids were connected to software where overall gain was decreased by 2 steps.    Reviewed 45 day trial period, care, cleaning (no water, dry brush), batteries (size rechargeable) insertion/removal, toxicity, low-battery signal), aid insertion/removal, volume adjustment (if applicable), user booklet, warranty information, storage cases, and other hearing aid details.     No charge visit today (in warranty hearing aid check).     ASSESSMENT: A follow-up appointment for hearing aid fitting was completed today. IOI-HA administered today. Changes to hearing aid was completed as outlined above.     PLAN: Suhail will return for follow-up as needed, or at least every 9-12 months for cleaning and assessment of hearing aid.  Please call this clinic with any questions regarding today s appointment.    Osiel Davis, CCC-A  Licensed Audiologist  [place] #[id number]      06/23/23

## 2023-07-01 ENCOUNTER — HEALTH MAINTENANCE LETTER (OUTPATIENT)
Age: 53
End: 2023-07-01

## 2023-07-15 DIAGNOSIS — H92.12 OTORRHEA, LEFT: ICD-10-CM

## 2023-07-17 DIAGNOSIS — H69.92 EUSTACHIAN TUBE DISORDER, LEFT: ICD-10-CM

## 2023-07-17 RX ORDER — ACETIC ACID 20.65 MG/ML
SOLUTION AURICULAR (OTIC)
Qty: 10 ML | Refills: 11 | Status: SHIPPED | OUTPATIENT
Start: 2023-07-17

## 2023-07-17 RX ORDER — ACETIC ACID 20.65 MG/ML
SOLUTION AURICULAR (OTIC)
Qty: 15 ML | Refills: 11 | OUTPATIENT
Start: 2023-07-17

## 2023-07-18 ENCOUNTER — OFFICE VISIT (OUTPATIENT)
Dept: FAMILY MEDICINE | Facility: CLINIC | Age: 53
End: 2023-07-18
Payer: COMMERCIAL

## 2023-07-18 VITALS
OXYGEN SATURATION: 97 % | DIASTOLIC BLOOD PRESSURE: 79 MMHG | HEIGHT: 71 IN | BODY MASS INDEX: 30.24 KG/M2 | WEIGHT: 216 LBS | HEART RATE: 70 BPM | TEMPERATURE: 98.2 F | SYSTOLIC BLOOD PRESSURE: 118 MMHG

## 2023-07-18 DIAGNOSIS — E66.3 OVERWEIGHT: ICD-10-CM

## 2023-07-18 DIAGNOSIS — Z00.00 ROUTINE GENERAL MEDICAL EXAMINATION AT A HEALTH CARE FACILITY: Primary | ICD-10-CM

## 2023-07-18 DIAGNOSIS — L30.8 OTHER ECZEMA: ICD-10-CM

## 2023-07-18 DIAGNOSIS — Z86.0101 HX OF ADENOMATOUS POLYP OF COLON: ICD-10-CM

## 2023-07-18 DIAGNOSIS — N18.31 STAGE 3A CHRONIC KIDNEY DISEASE (H): ICD-10-CM

## 2023-07-18 DIAGNOSIS — E78.5 HYPERLIPIDEMIA LDL GOAL <100: ICD-10-CM

## 2023-07-18 DIAGNOSIS — B35.1 ONYCHOMYCOSIS: ICD-10-CM

## 2023-07-18 DIAGNOSIS — H69.92 DYSFUNCTION OF LEFT EUSTACHIAN TUBE: ICD-10-CM

## 2023-07-18 PROCEDURE — 90677 PCV20 VACCINE IM: CPT | Performed by: FAMILY MEDICINE

## 2023-07-18 PROCEDURE — 90471 IMMUNIZATION ADMIN: CPT | Performed by: FAMILY MEDICINE

## 2023-07-18 PROCEDURE — 99396 PREV VISIT EST AGE 40-64: CPT | Mod: 25 | Performed by: FAMILY MEDICINE

## 2023-07-18 PROCEDURE — 99213 OFFICE O/P EST LOW 20 MIN: CPT | Mod: 25 | Performed by: FAMILY MEDICINE

## 2023-07-18 RX ORDER — TERBINAFINE HYDROCHLORIDE 250 MG/1
250 TABLET ORAL DAILY
Qty: 90 TABLET | Refills: 0 | Status: SHIPPED | OUTPATIENT
Start: 2023-07-18 | End: 2023-10-16

## 2023-07-18 RX ORDER — SIMVASTATIN 20 MG
20 TABLET ORAL AT BEDTIME
Qty: 90 TABLET | Refills: 3 | Status: SHIPPED | OUTPATIENT
Start: 2023-07-18 | End: 2024-04-26

## 2023-07-18 RX ORDER — TRIAMCINOLONE ACETONIDE 1 MG/G
OINTMENT TOPICAL
Qty: 80 G | Refills: 2 | Status: SHIPPED | OUTPATIENT
Start: 2023-07-18

## 2023-07-18 ASSESSMENT — ENCOUNTER SYMPTOMS
PALPITATIONS: 0
HEARTBURN: 0
NERVOUS/ANXIOUS: 0
CHILLS: 0
HEADACHES: 1
PARESTHESIAS: 0
SHORTNESS OF BREATH: 0
COUGH: 0
DYSURIA: 0
JOINT SWELLING: 0
ABDOMINAL PAIN: 0
DIARRHEA: 0
FREQUENCY: 0
DIZZINESS: 0
HEMATURIA: 0
ARTHRALGIAS: 0
CONSTIPATION: 0
SORE THROAT: 0
FEVER: 0
NAUSEA: 0
MYALGIAS: 0
HEMATOCHEZIA: 0
EYE PAIN: 0
WEAKNESS: 0

## 2023-07-18 ASSESSMENT — PAIN SCALES - GENERAL: PAINLEVEL: NO PAIN (0)

## 2023-07-18 NOTE — PROGRESS NOTES
SUBJECTIVE:   CC: Suhail is an 52 year old who presents for a preventative health visit and follow-up on baseline health conditions.       7/18/2023     8:37 AM   Additional Questions   Roomed by cam   Accompanied by none         7/18/2023     8:37 AM   Patient Reported Additional Medications   Patient reports taking the following new medications none     Healthy Habits:     Getting at least 3 servings of Calcium per day:  Yes    Bi-annual eye exam:  Yes    Dental care twice a year:  Yes    Sleep apnea or symptoms of sleep apnea:  None    Diet:  Regular (no restrictions)    Frequency of exercise:  2-3 days/week    Duration of exercise:  30-45 minutes    Taking medications regularly:  Yes    Barriers to taking medications:  None    Medication side effects:  None    Additional concerns today:  No    He remains on simvastatin for hyperlipidemia and CKD.  He has been using Allegra for allergy symptoms.  He generally feels well.  He has had chronic left ear issues and had a PE tube placed a couple of months ago.  He uses VoSol eardrops periodically into his ear.    He has been trying to clear toenail fungus with topical products and has been unsuccessful.  He wonders what else he could do for that.      Social History     Tobacco Use     Smoking status: Never     Smokeless tobacco: Never   Substance Use Topics     Alcohol use: Yes     Alcohol/week: 1.0 standard drink of alcohol     Comment: once per week with a meal           7/18/2023     8:26 AM   Alcohol Use   Prescreen: >3 drinks/day or >7 drinks/week? No       Last PSA:   PSA   Date Value Ref Range Status   01/05/2021 1.24 0 - 4 ug/L Final     Comment:     Assay Method:  Chemiluminescence using Siemens Vista analyzer       Reviewed orders with patient. Reviewed health maintenance and updated orders accordingly - Yes  Patient Active Problem List   Diagnosis     FH: kidney cancer     Conductive hearing loss in left ear     Donor of kidney for transplant     Lichen  simplex chronicus     ACP (advance care planning)     Hyperlipidemia LDL goal <100     Overweight (BMI 25.0-29.9)     Stage 3a chronic kidney disease (H)     Labral tear of shoulder, left, subsequent encounter     Shoulder instability, left     Hx of adenomatous polyp of colon     Dysfunction of left eustachian tube     Past Surgical History:   Procedure Laterality Date     ARTHROSCOPY SHOULDER, OPEN BICEP TENODESIS REPAIR, COMBINED Left 11/16/2018    Procedure: Left Shoulder Diagnostic Arthroscopy, Labral Debridement, Open Biceps Tenodesis;  Surgeon: Anai Mata MD;  Location: [facility] OR     ENT SURGERY  multiple times    pe tubes     ENT SURGERY      tonsils     ENT SURGERY  11/2011    Tubes 5 times since last time 10/21/14     GENITOURINARY SURGERY  Oct. 2013    Vasectomy     LAPAROSCOPIC DONOR HAND ASSISTED KIDNEY LIVING RELATED N/A 10/13/2016    Procedure: LAPAROSCOPIC DONOR HAND ASSISTED KIDNEY LIVING RELATED;  Surgeon: Tigre Quarles MD;  Location: [facility] OR     LASER DIODE TYMPANOMASTOIDECTOMY Left 3/31/2015    Procedure: LASER DIODE TYMPANOMASTOIDECTOMY;  Surgeon: Yaneth Rogel MD;  Location: [facility] OR     LASER DIODE TYMPANOTOMY Left 6/12/2019    Procedure: Laser Myringotomy with Diode Laser and T-tube Placement;  Surgeon: Yaneth Rogel MD;  Location:  OR     MYRINGOTOMY, INSERT TUBE, COMBINED Left 5/8/2023    Procedure: COMBINED MYRINGOTOMY, INSERT TUBE, using laser;  Surgeon: Yaneth Rogel MD;  [place] [facility] [place]     ORTHOPEDIC SURGERY  child    dislocated hip     OSSICULAR CHAIN RECONSTRUCTION N/A 10/9/2015    Procedure: OSSICULAR CHAIN RECONSTRUCTION;  Surgeon: Yaneth Rogel MD;  Location:  OR     TYMPANOPLASTY Left 10/9/2015    Procedure: TYMPANOPLASTY;  Surgeon: Yaneth Rogel MD;  Location:  OR       Social History     Tobacco Use     Smoking status: Never     Smokeless tobacco: Never   Substance Use Topics     Alcohol use: Yes     Alcohol/week: 1.0 standard drink of alcohol      Comment: once per week with a meal     Family History   Problem Relation Age of Onset     Gastrointestinal Disease Mother         colostomy for diverticulitis     Cerebrovascular Disease Mother         after giving birth     Kidney Disease Brother         dysplasia     Genitourinary Problems Brother      Cardiovascular Brother         ablation     Hypertension Brother      Heart Disease Father      Genitourinary Problems Father      Diabetes Type 2  Father         obese     Cancer Paternal Grandfather         bone cancer     Aneurysm Paternal Uncle         brain aneurysm on coumadin     Hypertension Brother          Current Outpatient Medications   Medication Sig Dispense Refill     acetic acid (VOSOL) 2 % otic solution 4 drops to the left ear- daily or every other day 10 mL 11     fexofenadine (ALLEGRA) 180 MG tablet Take 180 mg by mouth daily       fluticasone (FLONASE) 50 MCG/ACT nasal spray INSTILL 1 SPRAY INTO BOTH NOSTRILS DAILY 16 mL 1     simvastatin (ZOCOR) 20 MG tablet TAKE 1 TABLET BY MOUTH AT BEDTIME 90 tablet 0     triamcinolone (KENALOG) 0.1 % external ointment Apply sparingly to affected area three times a day 80 g 2     Allergies   Allergen Reactions     Seasonal Allergies        Reviewed and updated as needed this visit by clinical staff    Allergies  Meds              Reviewed and updated as needed this visit by Provider                     Review of Systems   Constitutional: Negative for chills and fever.   HENT: Positive for ear pain and hearing loss. Negative for congestion and sore throat.    Eyes: Negative for pain and visual disturbance.   Respiratory: Negative for cough and shortness of breath.    Cardiovascular: Negative for chest pain, palpitations and peripheral edema.   Gastrointestinal: Negative for abdominal pain, constipation, diarrhea, heartburn, hematochezia and nausea.   Genitourinary: Negative for dysuria, frequency, genital sores, hematuria, impotence, penile discharge and  "urgency.   Musculoskeletal: Negative for arthralgias, joint swelling and myalgias.   Skin: Negative for rash.   Neurological: Positive for headaches. Negative for dizziness, weakness and paresthesias.   Psychiatric/Behavioral: Negative for mood changes. The patient is not nervous/anxious.      He has a 5-year of follow-up colonoscopy set up for September.    OBJECTIVE:   /79 (BP Location: Left arm, Patient Position: Sitting, Cuff Size: Adult Regular)   Pulse 70   Temp 98.2  F (36.8  C) (Temporal)   Ht 1.792 m (5' 10.55\")   Wt 98 kg (216 lb)   SpO2 97%   BMI 30.51 kg/m      Physical Exam  GENERAL: alert, no distress and over weight  EYES: Eyes grossly normal to inspection, PERRL and conjunctivae and sclerae normal  HENT: Right external canal and TM appears healthy.  Left external canal is clear.  He has a PE tube in the left TM.  The TM is somewhat opaque, but not erythematous.  NECK: no adenopathy, no asymmetry, masses, or scars and thyroid normal to palpation  RESP: lungs clear to auscultation - no rales, rhonchi or wheezes  CV: regular rate and rhythm, normal S1 S2, no S3 or S4, no murmur, click or rub, no peripheral edema and peripheral pulses strong  ABDOMEN: soft, nontender, no hepatosplenomegaly, no masses   MS: no gross musculoskeletal defects noted, no edema  SKIN: no suspicious lesions or rashes.  He has yellowish thickening of his toenails consistent with onychomycosis.  NEURO: Normal strength and tone, mentation intact and speech normal  PSYCH: mentation appears normal, affect normal/bright    Diagnostic Test Results:  Labs reviewed in Epic    ASSESSMENT/PLAN:       ICD-10-CM    1. Routine general medical examination at a health care facility  Z00.00 Prostate Specific Antigen Screen     PNEUMOCOCCAL 20 VALENT CONJUGATE (PREVNAR 20)      2. Hyperlipidemia LDL goal <100  E78.5 Lipid panel reflex to direct LDL Fasting     simvastatin (ZOCOR) 20 MG tablet      3. Stage 3a chronic kidney disease " "(H)  N18.31 Albumin Random Urine Quantitative with Creat Ratio     PNEUMOCOCCAL 20 VALENT CONJUGATE (PREVNAR 20)      4. Other eczema  L30.8 triamcinolone (KENALOG) 0.1 % external ointment      5. Dysfunction of left eustachian tube  H69.82       6. Hx of adenomatous polyp of colon  Z86.010       7. Onychomycosis  B35.1 terbinafine (LAMISIL) 250 MG tablet     Hepatic panel (Albumin, ALT, AST, Bili, Alk Phos, TP)      8. Overweight  E66.3         Blood pressure and other vital signs look good  We discussed the above items  We will continue his same baseline meds  We will have him return soon for fasting labs as ordered above  I will include liver function tests with those labs because we discussed treatment for his onychomycosis and he was interested in pursuing terbinafine, so if his LFTs are normal then he will proceed with a 3-month course of that  Follow through with the colonoscopy in September as scheduled  We will give him a Prevnar 20 today  Continue routine ear care with ENT as per their recommendation  Plan a tentative recheck in 1 year, or sooner prn        COUNSELING:   Reviewed preventive health counseling, as reflected in patient instructions       Regular exercise       Healthy diet/nutrition       Immunizations    Vaccinated for: Pneumococcal          BMI:   Estimated body mass index is 30.27 kg/m  as calculated from the following:    Height as of 6/15/23: 1.803 m (5' 11\").    Weight as of 6/15/23: 98.4 kg (217 lb).   Weight management plan: Discussed healthy diet and exercise guidelines      He reports that he has never smoked. He has never used smokeless tobacco.        To Whitt MD  [facility]  "

## 2023-08-04 ENCOUNTER — LAB (OUTPATIENT)
Dept: LAB | Facility: CLINIC | Age: 53
End: 2023-08-04
Payer: COMMERCIAL

## 2023-08-04 DIAGNOSIS — E78.5 HYPERLIPIDEMIA LDL GOAL <100: ICD-10-CM

## 2023-08-04 DIAGNOSIS — B35.1 ONYCHOMYCOSIS: ICD-10-CM

## 2023-08-04 DIAGNOSIS — Z00.00 ROUTINE GENERAL MEDICAL EXAMINATION AT A HEALTH CARE FACILITY: ICD-10-CM

## 2023-08-04 DIAGNOSIS — N18.31 STAGE 3A CHRONIC KIDNEY DISEASE (H): ICD-10-CM

## 2023-08-04 LAB
ALBUMIN SERPL BCG-MCNC: 4.4 G/DL (ref 3.5–5.2)
ALP SERPL-CCNC: 59 U/L (ref 40–129)
ALT SERPL W P-5'-P-CCNC: 33 U/L (ref 0–70)
AST SERPL W P-5'-P-CCNC: 36 U/L (ref 0–45)
BILIRUB DIRECT SERPL-MCNC: <0.2 MG/DL (ref 0–0.3)
BILIRUB SERPL-MCNC: 0.4 MG/DL
CHOLEST SERPL-MCNC: 133 MG/DL
CREAT UR-MCNC: 118 MG/DL
HDLC SERPL-MCNC: 35 MG/DL
LDLC SERPL CALC-MCNC: 69 MG/DL
MICROALBUMIN UR-MCNC: <12 MG/L
MICROALBUMIN/CREAT UR: NORMAL MG/G{CREAT}
NONHDLC SERPL-MCNC: 98 MG/DL
PROT SERPL-MCNC: 7.2 G/DL (ref 6.4–8.3)
PSA SERPL DL<=0.01 NG/ML-MCNC: 1.35 NG/ML (ref 0–3.5)
TRIGL SERPL-MCNC: 147 MG/DL

## 2023-08-04 PROCEDURE — 80076 HEPATIC FUNCTION PANEL: CPT

## 2023-08-04 PROCEDURE — 80061 LIPID PANEL: CPT

## 2023-08-04 PROCEDURE — 36415 COLL VENOUS BLD VENIPUNCTURE: CPT

## 2023-08-04 PROCEDURE — 82043 UR ALBUMIN QUANTITATIVE: CPT

## 2023-08-04 PROCEDURE — G0103 PSA SCREENING: HCPCS

## 2023-08-04 PROCEDURE — 82570 ASSAY OF URINE CREATININE: CPT

## 2023-08-05 NOTE — RESULT ENCOUNTER NOTE
Suhail,  Your cholesterol values remain nice and low.  PSA is normal.  Urine test is within normal limits.  Your liver tests are normal as well, so feel free to go ahead and use the terbinafine medication for your toenail fungus.    To Whitt MD

## 2023-09-18 ENCOUNTER — TRANSFERRED RECORDS (OUTPATIENT)
Dept: HEALTH INFORMATION MANAGEMENT | Facility: CLINIC | Age: 53
End: 2023-09-18
Payer: COMMERCIAL

## 2023-10-06 DIAGNOSIS — H92.12 OTORRHEA, LEFT: Primary | ICD-10-CM

## 2023-10-06 RX ORDER — CIPROFLOXACIN AND DEXAMETHASONE 3; 1 MG/ML; MG/ML
SUSPENSION/ DROPS AURICULAR (OTIC)
Qty: 7.5 ML | Refills: 0 | Status: SHIPPED | OUTPATIENT
Start: 2023-10-06 | End: 2023-10-16

## 2023-10-11 NOTE — PROGRESS NOTES
Suhail Mustafa is seen for a left ear check after left laser myringotomy and t-tube placement. He recently developed bloody otorrhea on 10/5. He had no pain with it but after he noted the ear was full. Over the weekend, he had a headache and some slight imbalance. The headache and imbalance have improved, the fullness is slightly worse. He has been on Ciprodex since Saturday.    He reports no change in hearing and says that he only needs to use his drops once a week instead of every other day for moisture build up in the ear. No actual otorrhea, no otalgia.    Physical examination:  male in no acute distress.  Alert and answering questions appropriately.  HB 1/6 bilaterally.  Left ear examined under the microscope. T-tube in position in the inferior quadrant with wet, somewhat brown, cerumen and debris on the ear canal and TM which was suctioned off, the TM around the t-tube is heathy with no erythema or edema, the middle ear is visualized through the t-tube and the middle ear mucosa is edematous with clear otorrhea on the mucosa, no clot noted, no purulence noted.    Assessment and plan:  Improving. He'll use Ciprodex for a total of 10 days. We'll see him back prior to when he goes to [place] next month.    Please note the fellow prepared the chart but the patient was seen by myself only.

## 2023-10-12 ENCOUNTER — OFFICE VISIT (OUTPATIENT)
Dept: OTOLARYNGOLOGY | Facility: CLINIC | Age: 53
End: 2023-10-12
Payer: COMMERCIAL

## 2023-10-12 VITALS
OXYGEN SATURATION: 94 % | HEIGHT: 70 IN | HEART RATE: 72 BPM | WEIGHT: 219 LBS | BODY MASS INDEX: 31.35 KG/M2 | TEMPERATURE: 97.7 F | SYSTOLIC BLOOD PRESSURE: 123 MMHG | DIASTOLIC BLOOD PRESSURE: 84 MMHG

## 2023-10-12 DIAGNOSIS — H69.92 EUSTACHIAN TUBE DISORDER, LEFT: Primary | ICD-10-CM

## 2023-10-12 DIAGNOSIS — H92.12 OTORRHEA, LEFT: ICD-10-CM

## 2023-10-12 PROCEDURE — 99212 OFFICE O/P EST SF 10 MIN: CPT | Mod: 25 | Performed by: OTOLARYNGOLOGY

## 2023-10-12 PROCEDURE — 92504 EAR MICROSCOPY EXAMINATION: CPT | Performed by: OTOLARYNGOLOGY

## 2023-10-12 ASSESSMENT — PAIN SCALES - GENERAL: PAINLEVEL: MILD PAIN (3)

## 2023-10-12 NOTE — NURSING NOTE
"Chief Complaint   Patient presents with    RECHECK     Bloody  discharge -left ear      .Blood pressure 123/84, pulse 72, temperature 97.7  F (36.5  C), height 1.778 m (5' 10\"), weight 99.3 kg (219 lb), SpO2 94%.    Magdi Timmons LPN    "

## 2023-10-12 NOTE — PATIENT INSTRUCTIONS
1. You were seen in the ENT Clinic today by Dr. Rogel.  If you have any questions or concerns after your appointment, please call   - Option 1: ENT Clinic: [phone]   - Option 2: Bettie (Dr. Rogel's Nurse): [phone]          Teri (Dr. Rogel's Nurse): [phone]     2.   Plan to return to clinic on 11/9    3. Continue Ciprodrex for a total of 10 days    How to Contact Us:  Send a MapMyID message to your provider. Our team will respond to you via MapMyID. Occasionally, we will need to call you to get further information.  For urgent matters (Monday-Friday), call the ENT Clinic: [phone] and speak with a call center team member - they will route your call appropriately.   If you'd like to speak directly with a nurse, please find our contact information below. We do our best to check voicemail frequently throughout the day, and will work to call you back within 1-2 days. For urgent matters, please use the general clinic phone numbers listed above.        Bettie NICOLE LPN  [facility] - Otolaryngology

## 2023-10-12 NOTE — LETTER
10/12/2023       RE: Suhail Mustafa  [address] [place] [address]     Dear Colleague,    Thank you for referring your patient, Suhail Mustafa, to the [facility] [place] [facility]. Please see a copy of my visit note below.    Suhali Mustafa is seen for a left ear check after left laser myringotomy and t-tube placement. He recently developed bloody otorrhea on 10/5. He had no pain with it but after he noted the ear was full. Over the weekend, he had a headache and some slight imbalance. The headache and imbalance have improved, the fullness is slightly worse. He has been on Ciprodex since Saturday.    He reports no change in hearing and says that he only needs to use his drops once a week instead of every other day for moisture build up in the ear. No actual otorrhea, no otalgia.    Physical examination:  male in no acute distress.  Alert and answering questions appropriately.  HB 1/6 bilaterally.  Left ear examined under the microscope. T-tube in position in the inferior quadrant with wet, somewhat brown, cerumen and debris on the ear canal and TM which was suctioned off, the TM around the t-tube is heathy with no erythema or edema, the middle ear is visualized through the t-tube and the middle ear mucosa is edematous with clear otorrhea on the mucosa, no clot noted, no purulence noted.    Assessment and plan:  Improving. He'll use Ciprodex for a total of 10 days. We'll see him back prior to when he goes to [place] next month.    Please note the fellow prepared the chart but the patient was seen by myself only.      Again, thank you for allowing me to participate in the care of your patient.      Sincerely,    Yaneth Rogel MD

## 2023-11-09 ENCOUNTER — OFFICE VISIT (OUTPATIENT)
Dept: OTOLARYNGOLOGY | Facility: CLINIC | Age: 53
End: 2023-11-09
Payer: COMMERCIAL

## 2023-11-09 VITALS
TEMPERATURE: 96.9 F | HEIGHT: 70 IN | OXYGEN SATURATION: 97 % | HEART RATE: 96 BPM | WEIGHT: 220 LBS | BODY MASS INDEX: 31.5 KG/M2 | SYSTOLIC BLOOD PRESSURE: 114 MMHG | DIASTOLIC BLOOD PRESSURE: 77 MMHG

## 2023-11-09 DIAGNOSIS — H90.72 MIXED CONDUCTIVE AND SENSORINEURAL HEARING LOSS OF LEFT EAR WITH UNRESTRICTED HEARING OF RIGHT EAR: ICD-10-CM

## 2023-11-09 DIAGNOSIS — H92.12 OTORRHEA, LEFT: Primary | ICD-10-CM

## 2023-11-09 DIAGNOSIS — H69.92 DYSFUNCTION OF LEFT EUSTACHIAN TUBE: ICD-10-CM

## 2023-11-09 PROCEDURE — 92504 EAR MICROSCOPY EXAMINATION: CPT | Performed by: OTOLARYNGOLOGY

## 2023-11-09 PROCEDURE — 99212 OFFICE O/P EST SF 10 MIN: CPT | Mod: 25 | Performed by: OTOLARYNGOLOGY

## 2023-11-09 RX ORDER — TERBINAFINE HYDROCHLORIDE 250 MG/1
250 TABLET ORAL DAILY
COMMUNITY
End: 2024-04-26

## 2023-11-09 RX ORDER — PREDNISOLONE ACETATE 10 MG/ML
4 SUSPENSION/ DROPS OPHTHALMIC
Qty: 10 ML | Refills: 0 | Status: SHIPPED | OUTPATIENT
Start: 2023-11-09

## 2023-11-09 RX ORDER — PRENATAL VIT 91/IRON/FOLIC/DHA 28-975-200
COMBINATION PACKAGE (EA) ORAL 2 TIMES DAILY
COMMUNITY

## 2023-11-09 ASSESSMENT — PAIN SCALES - GENERAL: PAINLEVEL: MILD PAIN (3)

## 2023-11-09 NOTE — LETTER
"2023      RE: Suhail Mustafa  [address]   [facility] [facility] [address]       Name: Suhail Mustafa  MRN: [id number]  Age: 53 year old  : 2023      Chief Complaint:   Follow up     History of Present Illness:   Suhail Mustafa is a 53 year old male who underwent left laser myringotomy and t-tube placement on 23. At his last visit on 10/12/23 he had reported bloody otorrhea and aural fullness which we prescribed ciprodex drops for. Today he returns for a follow up and is doing well overall. Patient reports sound sensitivity which seems new. He does also report some headaches and a family history of migraine.    Physical examination:    /77   Pulse 96   Temp 96.9  F (36.1  C)   Ht 1.778 m (5' 10\")   Wt 99.8 kg (220 lb)   SpO2 97%   BMI 31.57 kg/m      Male in no acute distress.  Alert and answering questions appropriately.  HB 1/6 bilaterally.    Left ear examined under the microscope. T-tube in position in the inferior quadrant with wet cerumen and debris on the ear canal and TM which was suctioned off, the TM around the t-tube is heathy with no erythema or edema, the middle ear is visualized through the t-tube and the middle ear mucosa is healthy with slight clear mucoid otorrhea, no retractions noted.     Assessment and plan: Suhail Mustafa is a 53 year old male who underwent left laser myringotomy and t-tube placement on 23. Ear exam healthy. We discussed that the sound sensitivity may be secondary to his headaches and possible migraine as sound sensitivity is likely a central phenomenon as he does not endorse any hearing changes. Patient was advised to follow up with PCP and use Pred Forte ear drops twice a week. He will follow up in 3-4 months.     Scribe Disclosure:   I, MAKI NAM, am serving as a scribe; to document services personally performed by Yaneth Rogel MD -based on data collection and the provider's statements to me.     Provider Disclosure:  I " agree with above History, Review of Systems, Physical exam and Plan.  I have reviewed the content of the documentation and have edited it as needed. I have personally performed the services documented here and the documentation accurately represents those services and the decisions I have made.        Yaneth Rogel MD

## 2023-11-09 NOTE — NURSING NOTE
"Chief Complaint   Patient presents with    RECHECK     1 month follow up     Blood pressure 114/77, pulse 96, temperature 96.9  F (36.1  C), height 1.778 m (5' 10\"), weight 99.8 kg (220 lb), SpO2 97%.  Magdi Timmons LPN    "

## 2023-11-09 NOTE — PATIENT INSTRUCTIONS
1. You were seen in the ENT Clinic today by Dr. Rogel.  If you have any questions or concerns after your appointment, please call   - Option 1: ENT Clinic: [phone]   - Option 2: Bettie (Dr. Rogel's Nurse): [phone]          Teri (Dr. Rogel's Nurse): [phone]     2.   Plan to return to clinic in 3 months    3. Sent prescription to the pharmacy- pred-forte use as directed    How to Contact Us:  Send a Plainlegal message to your provider. Our team will respond to you via Plainlegal. Occasionally, we will need to call you to get further information.  For urgent matters (Monday-Friday), call the ENT Clinic: [phone] and speak with a call center team member - they will route your call appropriately.   If you'd like to speak directly with a nurse, please find our contact information below. We do our best to check voicemail frequently throughout the day, and will work to call you back within 1-2 days. For urgent matters, please use the general clinic phone numbers listed above.        Bettie NICOLE LPN  [facility] - Otolaryngology

## 2023-11-09 NOTE — PROGRESS NOTES
"Name: Suhail Mustafa  MRN: [id number]  Age: 53 year old  : 2023      Chief Complaint:   Follow up     History of Present Illness:   Suhail Mustafa is a 53 year old male who underwent left laser myringotomy and t-tube placement on 23. At his last visit on 10/12/23 he had reported bloody otorrhea and aural fullness which we prescribed ciprodex drops for. Today he returns for a follow up and is doing well overall. Patient reports sound sensitivity which seems new. He does also report some headaches and a family history of migraine.    Physical examination:    /77   Pulse 96   Temp 96.9  F (36.1  C)   Ht 1.778 m (5' 10\")   Wt 99.8 kg (220 lb)   SpO2 97%   BMI 31.57 kg/m      Male in no acute distress.  Alert and answering questions appropriately.  HB 1/6 bilaterally.    Left ear examined under the microscope. T-tube in position in the inferior quadrant with wet cerumen and debris on the ear canal and TM which was suctioned off, the TM around the t-tube is heathy with no erythema or edema, the middle ear is visualized through the t-tube and the middle ear mucosa is healthy with slight clear mucoid otorrhea, no retractions noted.     Assessment and plan: Suhail Mustafa is a 53 year old male who underwent left laser myringotomy and t-tube placement on 23. Ear exam healthy. We discussed that the sound sensitivity may be secondary to his headaches and possible migraine as sound sensitivity is likely a central phenomenon as he does not endorse any hearing changes. Patient was advised to follow up with PCP and use Pred Forte ear drops twice a week. He will follow up in 3-4 months.     Scribe Disclosure:   I, MAKI NAM, am serving as a scribe; to document services personally performed by Yaneth Rogel MD -based on data collection and the provider's statements to me.     Provider Disclosure:  I agree with above History, Review of Systems, Physical exam and Plan.  I have reviewed " the content of the documentation and have edited it as needed. I have personally performed the services documented here and the documentation accurately represents those services and the decisions I have made.

## 2023-12-11 ENCOUNTER — E-VISIT (OUTPATIENT)
Dept: FAMILY MEDICINE | Facility: CLINIC | Age: 53
End: 2023-12-11
Payer: COMMERCIAL

## 2023-12-11 DIAGNOSIS — J06.9 UPPER RESPIRATORY TRACT INFECTION, UNSPECIFIED TYPE: Primary | ICD-10-CM

## 2023-12-11 PROCEDURE — 99421 OL DIG E/M SVC 5-10 MIN: CPT | Performed by: FAMILY MEDICINE

## 2023-12-11 RX ORDER — CODEINE PHOSPHATE AND GUAIFENESIN 10; 100 MG/5ML; MG/5ML
1-2 SOLUTION ORAL EVERY 6 HOURS PRN
Qty: 120 ML | Refills: 0 | Status: SHIPPED | OUTPATIENT
Start: 2023-12-11 | End: 2024-04-26

## 2023-12-11 RX ORDER — PREDNISONE 20 MG/1
40 TABLET ORAL
Qty: 10 TABLET | Refills: 0 | Status: SHIPPED | OUTPATIENT
Start: 2023-12-11 | End: 2024-04-26

## 2024-01-29 ENCOUNTER — ALLIED HEALTH/NURSE VISIT (OUTPATIENT)
Dept: AUDIOLOGY | Facility: CLINIC | Age: 54
End: 2024-01-29
Payer: COMMERCIAL

## 2024-01-29 DIAGNOSIS — H90.A32 MIXED CONDUCTIVE AND SENSORINEURAL HEARING LOSS OF LEFT EAR WITH RESTRICTED HEARING OF RIGHT EAR: ICD-10-CM

## 2024-01-29 DIAGNOSIS — H90.A21 SENSORINEURAL HEARING LOSS (SNHL) OF RIGHT EAR WITH RESTRICTED HEARING OF LEFT EAR: Primary | ICD-10-CM

## 2024-01-29 PROCEDURE — V5299 HEARING SERVICE: HCPCS

## 2024-01-29 NOTE — PROGRESS NOTES
HEARING AID DROP-OFF     Background:              Patient dropped off their hearing aid with the report of hearing aid not working                             SIDE: Left                          : Phonak                          TYPE:  Bellyeo L50-RL                           S/N: [id number]                           WARRANTY: 8/16/2026     Procedures:              Upon arrival, hearing aid was charged. A listening check found no sound output. Hearing aid was cleaned,  replaced and a listening check found good sound output. Hearing aid ready for pick-up      Plan:              Patient was contacted in regards to status of hearing aid dropped off today.      NO CHARGE     Jacquelyn Cartagena CCC-A  Licensed Audiologist  [place] #[id number]  January 29, 2024

## 2024-02-08 ENCOUNTER — OFFICE VISIT (OUTPATIENT)
Dept: OTOLARYNGOLOGY | Facility: CLINIC | Age: 54
End: 2024-02-08
Payer: COMMERCIAL

## 2024-02-08 VITALS
BODY MASS INDEX: 31.64 KG/M2 | TEMPERATURE: 96.8 F | HEIGHT: 70 IN | WEIGHT: 221 LBS | DIASTOLIC BLOOD PRESSURE: 86 MMHG | SYSTOLIC BLOOD PRESSURE: 121 MMHG | OXYGEN SATURATION: 97 % | HEART RATE: 82 BPM

## 2024-02-08 DIAGNOSIS — H69.92 DYSFUNCTION OF LEFT EUSTACHIAN TUBE: Primary | ICD-10-CM

## 2024-02-08 PROCEDURE — 99212 OFFICE O/P EST SF 10 MIN: CPT | Mod: 25 | Performed by: OTOLARYNGOLOGY

## 2024-02-08 PROCEDURE — 92504 EAR MICROSCOPY EXAMINATION: CPT | Performed by: OTOLARYNGOLOGY

## 2024-02-08 ASSESSMENT — PAIN SCALES - GENERAL: PAINLEVEL: MILD PAIN (2)

## 2024-02-08 NOTE — Clinical Note
"2/8/2024       RE: Suhail Mustafa  [address]   [facility] [address]     Dear Colleague,    Thank you for referring your patient, Suhail Mustafa, to the [facility] EAR NOSE AND THROAT CLINIC [place] at [facility]. Please see a copy of my visit note below.    Suhail Mustafa is seen as a 53 year old male who underwent left laser myringotomy and t-tube placement on 5/8/23. Today he returns for a follow up and is doing well overall. He also reports a family history of migraine.     Physical examination:   /86   Pulse 82   Temp 96.8  F (36  C)   Ht 1.778 m (5' 10\")   Wt 100.2 kg (221 lb)   SpO2 97%   BMI 31.71 kg/m       male in no acute distress.  Alert and answering questions appropriately.  HB 1/6 bilaterally.  Bilateral ears examined under the microscope.    Audiogram:  Audiogram was independently reviewed. ***    Assessment and plan:  Suhail Mustafa is a 53 year old male who underwent left laser myringotomy and t-tube placement on 5/8/23. Will follow up in 6 months.       Scribe Disclosure:   I, Regina Chadwick, am serving as a scribe; to document services personally performed by Yaneth Rogel MD -based on data collection and the provider's statements to me.     Provider Disclosure:  I agree with above History, Review of Systems, Physical exam and Plan.  I have reviewed the content of the documentation and have edited it as needed. I have personally performed the services documented here and the documentation accurately represents those services and the decisions I have made.      Electronically signed by:  ***      Suhail Mustafa is seen as a 53 year old male who underwent left laser myringotomy and t-tube placement on 5/8/23. Today he returns for a follow up and is doing well overall. He has had several URIs this winter and he says he can feel the drops going in but isn't tasting them. He just used Vosol and didn't taste it. The " "pred forte works well for drying up his ear.    Physical examination:   /86   Pulse 82   Temp 96.8  F (36  C)   Ht 1.778 m (5' 10\")   Wt 100.2 kg (221 lb)   SpO2 97%   BMI 31.71 kg/m      Male in no acute distress.  Alert and answering questions appropriately.  HB 1/6 bilaterally.  Bilateral ears examined under the microscope. Right ear canal clear, TM intact, middle ear aerated. Left ear canal clear, slight soft crusting around the base of the t-tube which was removed with a little squamous epithelium between the base and the TM which was suctioned free, lumen open, middle ear clear.    Assessment and plan:  Suhail Mustafa is a 53 year old male who underwent left laser myringotomy and t-tube placement on 5/8/23. Doing well. Hopefully his eustachian tube will open up once his URIs resolve although he has an open t-tube to aid in aeration. Will follow up in 6 months.     Scribe Disclosure:   I, Regina Chadwick, am serving as a scribe; to document services personally performed by Yaneth Rogel MD -based on data collection and the provider's statements to me.     Provider Disclosure:  I agree with above History, Review of Systems, Physical exam and Plan.  I have reviewed the content of the documentation and have edited it as needed. I have personally performed the services documented here and the documentation accurately represents those services and the decisions I have made.        Again, thank you for allowing me to participate in the care of your patient.      Sincerely,    Yaneth Rogel MD    "

## 2024-02-08 NOTE — NURSING NOTE
"Chief Complaint   Patient presents with    RECHECK     3 month follow up     Blood pressure 121/86, pulse 82, temperature 96.8  F (36  C), height 1.778 m (5' 10\"), weight 100.2 kg (221 lb), SpO2 97%.  Magdi Timmons LPN    "

## 2024-02-08 NOTE — PROGRESS NOTES
"Suhail Mustafa is seen as a 53 year old male who underwent left laser myringotomy and t-tube placement on 5/8/23. Today he returns for a follow up and is doing well overall. He has had several URIs this winter and he says he can feel the drops going in but isn't tasting them. He just used Vosol and didn't taste it. The pred forte works well for drying up his ear.    Physical examination:   /86   Pulse 82   Temp 96.8  F (36  C)   Ht 1.778 m (5' 10\")   Wt 100.2 kg (221 lb)   SpO2 97%   BMI 31.71 kg/m      Male in no acute distress.  Alert and answering questions appropriately.  HB 1/6 bilaterally.  Bilateral ears examined under the microscope. Right ear canal clear, TM intact, middle ear aerated. Left ear canal clear, slight soft crusting around the base of the t-tube which was removed with a little squamous epithelium between the base and the TM which was suctioned free, lumen open, middle ear clear.    Assessment and plan:  Suhail Mustafa is a 53 year old male who underwent left laser myringotomy and t-tube placement on 5/8/23. Doing well. Hopefully his eustachian tube will open up once his URIs resolve although he has an open t-tube to aid in aeration. Will follow up in 6 months.     Scribe Disclosure:   I, Regina Chadwick, am serving as a scribe; to document services personally performed by Yaneth Rogel MD -based on data collection and the provider's statements to me.     Provider Disclosure:  I agree with above History, Review of Systems, Physical exam and Plan.  I have reviewed the content of the documentation and have edited it as needed. I have personally performed the services documented here and the documentation accurately represents those services and the decisions I have made.    "

## 2024-02-08 NOTE — PATIENT INSTRUCTIONS
You were seen in the ENT Clinic today by Dr. Rogel. If you have any questions or concerns after your appointment, please contact us (see below)      2.   Please return to clinic in 6 months.         How to Contact Us:  Send a Adify message to your provider. Our team will respond to you via Adify. Occasionally, we will need to call you to get further information.  For urgent matters (Monday-Friday), call the ENT Clinic: [phone] and speak with a call center team member - they will route your call appropriately.   If you'd like to speak directly with a nurse, please find our contact information below. We do our best to check voicemail frequently throughout the day, and will work to call you back within 1-2 days. For urgent matters, please use the general clinic phone numbers listed above.      Teri PRASAD RN  ENT RN Care Coordinator  Direct: [phone]    Bettie GLOVER LPN  Direct: [phone]

## 2024-02-19 ENCOUNTER — PATIENT OUTREACH (OUTPATIENT)
Dept: GASTROENTEROLOGY | Facility: CLINIC | Age: 54
End: 2024-02-19
Payer: COMMERCIAL

## 2024-04-05 ENCOUNTER — OFFICE VISIT (OUTPATIENT)
Dept: AUDIOLOGY | Facility: CLINIC | Age: 54
End: 2024-04-05
Payer: COMMERCIAL

## 2024-04-05 DIAGNOSIS — H90.A21 SENSORINEURAL HEARING LOSS (SNHL) OF RIGHT EAR WITH RESTRICTED HEARING OF LEFT EAR: Primary | ICD-10-CM

## 2024-04-05 DIAGNOSIS — H90.A32 MIXED CONDUCTIVE AND SENSORINEURAL HEARING LOSS OF LEFT EAR WITH RESTRICTED HEARING OF RIGHT EAR: ICD-10-CM

## 2024-04-05 PROCEDURE — V5299 HEARING SERVICE: HCPCS

## 2024-04-05 NOTE — PROGRESS NOTES
AUDIOLOGY REPORT: HEARING AID RECHECK    SUBJECTIVE: Suhail Mustafa is a 53 year old male, :  1970, was seen in the Audiology Clinic at [facility] on 24 for a return check of their hearing aids.       Background:   Patient is here today with the complaint of hearing aids too loud and would like them adjusted.     Procedures:       SIDE: Both                          : Phonak                          TYPE:  Audeo L50-RL                           S/N: [id number] & [id number]                           WARRANTY: 2026    Both hearing aids connected to software where overall gain was decreased by 3 steps and firmware was updated. A visual inspection found wax traps occluded. Hearing aids were cleaned and a listening check found good sound output. Patient reports better volume of the hearing aids    Plan:   Patient will return as needed for hearing aid concerns.     NO CHARGE VISIT    Osiel Dick, CCC-A  Licensed Audiologist  [place] #[id number]  2024

## 2024-04-26 ENCOUNTER — OFFICE VISIT (OUTPATIENT)
Dept: FAMILY MEDICINE | Facility: CLINIC | Age: 54
End: 2024-04-26
Payer: COMMERCIAL

## 2024-04-26 VITALS
WEIGHT: 222 LBS | HEART RATE: 68 BPM | HEIGHT: 71 IN | OXYGEN SATURATION: 97 % | SYSTOLIC BLOOD PRESSURE: 118 MMHG | RESPIRATION RATE: 16 BRPM | TEMPERATURE: 97.9 F | BODY MASS INDEX: 31.08 KG/M2 | DIASTOLIC BLOOD PRESSURE: 80 MMHG

## 2024-04-26 DIAGNOSIS — R09.82 POSTNASAL DRIP: ICD-10-CM

## 2024-04-26 DIAGNOSIS — Z00.00 ROUTINE GENERAL MEDICAL EXAMINATION AT A HEALTH CARE FACILITY: Primary | ICD-10-CM

## 2024-04-26 DIAGNOSIS — H69.92 DYSFUNCTION OF LEFT EUSTACHIAN TUBE: ICD-10-CM

## 2024-04-26 DIAGNOSIS — E66.3 OVERWEIGHT: ICD-10-CM

## 2024-04-26 DIAGNOSIS — N18.31 STAGE 3A CHRONIC KIDNEY DISEASE (H): ICD-10-CM

## 2024-04-26 DIAGNOSIS — E78.5 HYPERLIPIDEMIA LDL GOAL <100: ICD-10-CM

## 2024-04-26 DIAGNOSIS — Z52.4 DONOR OF KIDNEY FOR TRANSPLANT: ICD-10-CM

## 2024-04-26 LAB
ANION GAP SERPL CALCULATED.3IONS-SCNC: 8 MMOL/L (ref 7–15)
BUN SERPL-MCNC: 18.9 MG/DL (ref 6–20)
CALCIUM SERPL-MCNC: 9 MG/DL (ref 8.6–10)
CHLORIDE SERPL-SCNC: 102 MMOL/L (ref 98–107)
CHOLEST SERPL-MCNC: 137 MG/DL
CREAT SERPL-MCNC: 1.55 MG/DL (ref 0.67–1.17)
DEPRECATED HCO3 PLAS-SCNC: 26 MMOL/L (ref 22–29)
EGFRCR SERPLBLD CKD-EPI 2021: 53 ML/MIN/1.73M2
ERYTHROCYTE [DISTWIDTH] IN BLOOD BY AUTOMATED COUNT: 11.5 % (ref 10–15)
FASTING STATUS PATIENT QL REPORTED: YES
GLUCOSE SERPL-MCNC: 97 MG/DL (ref 70–99)
HCT VFR BLD AUTO: 40.9 % (ref 40–53)
HDLC SERPL-MCNC: 34 MG/DL
HGB BLD-MCNC: 14.1 G/DL (ref 13.3–17.7)
LDLC SERPL CALC-MCNC: 62 MG/DL
MCH RBC QN AUTO: 32.3 PG (ref 26.5–33)
MCHC RBC AUTO-ENTMCNC: 34.5 G/DL (ref 31.5–36.5)
MCV RBC AUTO: 94 FL (ref 78–100)
NONHDLC SERPL-MCNC: 103 MG/DL
PLATELET # BLD AUTO: 216 10E3/UL (ref 150–450)
POTASSIUM SERPL-SCNC: 4.1 MMOL/L (ref 3.4–5.3)
RBC # BLD AUTO: 4.37 10E6/UL (ref 4.4–5.9)
SODIUM SERPL-SCNC: 136 MMOL/L (ref 135–145)
TRIGL SERPL-MCNC: 203 MG/DL
WBC # BLD AUTO: 4.8 10E3/UL (ref 4–11)

## 2024-04-26 PROCEDURE — 99396 PREV VISIT EST AGE 40-64: CPT | Performed by: FAMILY MEDICINE

## 2024-04-26 PROCEDURE — 80061 LIPID PANEL: CPT | Performed by: FAMILY MEDICINE

## 2024-04-26 PROCEDURE — 80048 BASIC METABOLIC PNL TOTAL CA: CPT | Performed by: FAMILY MEDICINE

## 2024-04-26 PROCEDURE — 99213 OFFICE O/P EST LOW 20 MIN: CPT | Mod: 25 | Performed by: FAMILY MEDICINE

## 2024-04-26 PROCEDURE — 36415 COLL VENOUS BLD VENIPUNCTURE: CPT | Performed by: FAMILY MEDICINE

## 2024-04-26 PROCEDURE — 85027 COMPLETE CBC AUTOMATED: CPT | Performed by: FAMILY MEDICINE

## 2024-04-26 RX ORDER — FLUTICASONE PROPIONATE 50 MCG
1 SPRAY, SUSPENSION (ML) NASAL DAILY
Qty: 16 ML | Refills: 5 | Status: SHIPPED | OUTPATIENT
Start: 2024-04-26

## 2024-04-26 RX ORDER — SIMVASTATIN 20 MG
20 TABLET ORAL AT BEDTIME
Qty: 90 TABLET | Refills: 3 | Status: SHIPPED | OUTPATIENT
Start: 2024-04-26

## 2024-04-26 RX ORDER — PREDNISOLONE ACETATE 10 MG/ML
4 SUSPENSION/ DROPS OPHTHALMIC
Qty: 10 ML | Refills: 0 | Status: CANCELLED | OUTPATIENT
Start: 2024-04-29

## 2024-04-26 SDOH — HEALTH STABILITY: PHYSICAL HEALTH: ON AVERAGE, HOW MANY DAYS PER WEEK DO YOU ENGAGE IN MODERATE TO STRENUOUS EXERCISE (LIKE A BRISK WALK)?: 2 DAYS

## 2024-04-26 ASSESSMENT — PAIN SCALES - GENERAL: PAINLEVEL: NO PAIN (0)

## 2024-04-26 ASSESSMENT — SOCIAL DETERMINANTS OF HEALTH (SDOH): HOW OFTEN DO YOU GET TOGETHER WITH FRIENDS OR RELATIVES?: TWICE A WEEK

## 2024-04-26 NOTE — PROGRESS NOTES
"Preventive Care Visit  [facility]  To Whitt MD, Family Medicine  Apr 26, 2024      Assessment & Plan       ICD-10-CM    1. Routine general medical examination at a health care facility  Z00.00       2. Hyperlipidemia LDL goal <100  E78.5 simvastatin (ZOCOR) 20 MG tablet     Lipid panel reflex to direct LDL Fasting      3. Donor of kidney for transplant  Z52.4       4. Stage 3a chronic kidney disease (H)  N18.31 BASIC METABOLIC PANEL     CBC with platelets     Albumin Random Urine Quantitative with Creat Ratio      5. Postnasal drip  R09.82 fluticasone (FLONASE) 50 MCG/ACT nasal spray      6. Dysfunction of left eustachian tube  H69.92       7. Overweight  E66.3         Blood pressure and other vital signs look good  We discussed the above items  Will check fasting lab work today as above  Continue same medications  Continue routine ENT follow-up for ear issues  Discussed diet and exercise treatment for weight control    His [employer] paperwork was filled out for him with no activity restrictions    Plan a tentative recheck in 1 year, or sooner prn      BMI  Estimated body mass index is 31.15 kg/m  as calculated from the following:    Height as of this encounter: 1.798 m (5' 10.79\").    Weight as of this encounter: 100.7 kg (222 lb).   Weight management plan: Discussed healthy diet and exercise guidelines    Counseling  Appropriate preventive services were discussed with this patient, including applicable screening as appropriate for fall prevention, nutrition, physical activity, Tobacco-use cessation, weight loss and cognition.  Checklist reviewing preventive services available has been given to the patient.  Reviewed patient's diet, addressing concerns and/or questions.   He is at risk for lack of exercise and has been provided with information to increase physical activity for the benefit of his well-being.         Rudi Jang is a 53 year old, presenting for the " following:  Physical        4/26/2024     6:59 AM   Additional Questions   Roomed by cam   Accompanied by none         4/26/2024     6:59 AM   Patient Reported Additional Medications   Patient reports taking the following new medications none        Health Care Directive  Patient has a Health Care Directive on file  Advance care planning document is on file and is current.    HPI    He generally feels healthy.  He remains on simvastatin for hyperlipidemia and his history of CKD.  He had donated a kidney to his brother years ago.  He sees ENT periodically for chronic left eustachian tube dysfunction.  He has a PE tube in place currently.  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      4/26/2024   General Health   How would you rate your overall physical health? Good   Feel stress (tense, anxious, or unable to sleep) To some extent   (!) STRESS CONCERN      4/26/2024   Nutrition   Three or more servings of calcium each day? Yes   Diet: Regular (no restrictions)   How many servings of fruit and vegetables per day? (!) 2-3   How many sweetened beverages each day? 0-1         4/26/2024   Exercise   Days per week of moderate/strenous exercise 2 days   (!) EXERCISE CONCERN      4/26/2024   Social Factors   Frequency of gathering with friends or relatives Twice a week   Worry food won't last until get money to buy more No   Food not last or not have enough money for food? No   Do you have housing?  Yes   Are you worried about losing your housing? No   Lack of transportation? No   Unable to get utilities (heat,electricity)? No         4/26/2024   Fall Risk   Fallen 2 or more times in the past year? No   Trouble with walking or balance? No          4/26/2024   Dental   Dentist two times every year? Yes         4/26/2024   TB Screening   Were you born outside of the [place]? No             4/26/2024   Substance Use   Alcohol more than 3/day or  more than 7/wk No   Do you use any other substances recreationally? No     Social History     Tobacco Use    Smoking status: Never    Smokeless tobacco: Never   Vaping Use    Vaping status: Never Used   Substance Use Topics    Alcohol use: Yes     Alcohol/week: 1.0 standard drink of alcohol     Comment: once per week with a meal    Drug use: No           4/26/2024   STI Screening   New sexual partner(s) since last STI/HIV test? No   Last PSA:   PSA   Date Value Ref Range Status   01/05/2021 1.24 0 - 4 ug/L Final     Comment:     Assay Method:  Chemiluminescence using Siemens Vista analyzer     Prostate Specific Antigen Screen   Date Value Ref Range Status   08/04/2023 1.35 0.00 - 3.50 ng/mL Final     ASCVD Risk   The 10-year ASCVD risk score (Chay BOSS, et al., 2019) is: 4.2%    Values used to calculate the score:      Age: 53 years      Sex: Male      Is Non- : No      Diabetic: No      Tobacco smoker: No      Systolic Blood Pressure: 118 mmHg      Is BP treated: No      HDL Cholesterol: 35 mg/dL      Total Cholesterol: 133 mg/dL           Reviewed and updated as needed this visit by Provider      Problems               Patient Active Problem List   Diagnosis    FH: kidney cancer    Conductive hearing loss in left ear    Donor of kidney for transplant    Lichen simplex chronicus    ACP (advance care planning)    Hyperlipidemia LDL goal <100    Overweight (BMI 25.0-29.9)    Stage 3a chronic kidney disease (H)    Labral tear of shoulder, left, subsequent encounter    Shoulder instability, left    Hx of adenomatous polyp of colon    Dysfunction of left eustachian tube     Past Surgical History:   Procedure Laterality Date    ARTHROSCOPY SHOULDER, OPEN BICEP TENODESIS REPAIR, COMBINED Left 11/16/2018    Procedure: Left Shoulder Diagnostic Arthroscopy, Labral Debridement, Open Biceps Tenodesis;  Surgeon: Anai Mata MD;  Location: [facility] OR    ENT SURGERY  multiple times    pe  tubes    ENT SURGERY      tonsils    ENT SURGERY  11/2011    Tubes 5 times since last time 10/21/14    GENITOURINARY SURGERY  Oct. 2013    Vasectomy    LAPAROSCOPIC DONOR HAND ASSISTED KIDNEY LIVING RELATED N/A 10/13/2016    Procedure: LAPAROSCOPIC DONOR HAND ASSISTED KIDNEY LIVING RELATED;  Surgeon: Tigre Quarles MD;  Location: [facility] OR    LASER DIODE TYMPANOMASTOIDECTOMY Left 3/31/2015    Procedure: LASER DIODE TYMPANOMASTOIDECTOMY;  Surgeon: Yaneth Rogel MD;  Location: [facility] OR    LASER DIODE TYMPANOTOMY Left 6/12/2019    Procedure: Laser Myringotomy with Diode Laser and T-tube Placement;  Surgeon: Yaneth Rogel MD;  Location: [facility] OR    MYRINGOTOMY, INSERT TUBE, COMBINED Left 5/8/2023    Procedure: COMBINED MYRINGOTOMY, INSERT TUBE, using laser;  Surgeon: Yaneth Rogel MD;  Location: [facility] OR    ORTHOPEDIC SURGERY  child    dislocated hip    OSSICULAR CHAIN RECONSTRUCTION N/A 10/9/2015    Procedure: OSSICULAR CHAIN RECONSTRUCTION;  Surgeon: Yaneth Rogel MD;  Location:  OR    TYMPANOPLASTY Left 10/9/2015    Procedure: TYMPANOPLASTY;  Surgeon: Yaneth Rogel MD;  Location:  OR       Social History     Tobacco Use    Smoking status: Never    Smokeless tobacco: Never   Substance Use Topics    Alcohol use: Yes     Alcohol/week: 1.0 standard drink of alcohol     Comment: once per week with a meal     Family History   Problem Relation Age of Onset    Gastrointestinal Disease Mother         colostomy for diverticulitis    Cerebrovascular Disease Mother         after giving birth    Kidney Disease Brother         dysplasia    Genitourinary Problems Brother     Cardiovascular Brother         ablation    Hypertension Brother     Heart Disease Father     Genitourinary Problems Father     Diabetes Type 2  Father         obese    Cancer Paternal Grandfather         bone cancer    Aneurysm Paternal Uncle         brain aneurysm on coumadin    Hypertension Brother          Current Outpatient Medications   Medication Sig  "Dispense Refill    acetic acid (VOSOL) 2 % otic solution 4 drops to the left ear- daily or every other day 10 mL 11    fexofenadine (ALLEGRA) 180 MG tablet Take 180 mg by mouth daily      fluticasone (FLONASE) 50 MCG/ACT nasal spray Spray 1 spray into both nostrils daily 16 mL 5    prednisoLONE acetate (PRED FORTE) 1 % ophthalmic suspension Place 4 drops Into the left ear twice a week 10 mL 0    simvastatin (ZOCOR) 20 MG tablet Take 1 tablet (20 mg) by mouth at bedtime 90 tablet 3    terbinafine (LAMISIL) 1 % external cream Apply topically 2 times daily      triamcinolone (KENALOG) 0.1 % external ointment Apply sparingly to affected area two times a day as needed for eczema 80 g 2     Allergies   Allergen Reactions    Seasonal Allergies          Review of Systems  He still gets some postnasal drip and uses Flonase periodically for that.  He uses eardrops occasionally for his left ear.  Constitutional, neuro, ENT, endocrine, pulmonary, cardiac, gastrointestinal, genitourinary, musculoskeletal, integument and psychiatric systems are negative, except as otherwise noted.     Objective    Exam  /80 (BP Location: Left arm, Patient Position: Sitting, Cuff Size: Adult Large)   Pulse 68   Temp 97.9  F (36.6  C) (Temporal)   Ht 1.798 m (5' 10.79\")   Wt 100.7 kg (222 lb)   SpO2 97%   BMI 31.15 kg/m     Estimated body mass index is 31.15 kg/m  as calculated from the following:    Height as of this encounter: 1.798 m (5' 10.79\").    Weight as of this encounter: 100.7 kg (222 lb).    Physical Exam  GENERAL: alert and no distress  EYES: Eyes grossly normal to inspection, PERRL and conjunctivae and sclerae normal  HENT: Has bilateral hearing aids  NECK: no adenopathy, no asymmetry, masses, or scars  RESP: lungs clear to auscultation - no rales, rhonchi or wheezes  CV: regular rate and rhythm, normal S1 S2, no S3 or S4, no murmur, click or rub, no peripheral edema, peripheral pulses are strong  ABDOMEN: soft, nontender, " no hepatosplenomegaly, no masses   MS: no gross musculoskeletal defects noted, no edema  SKIN: no suspicious lesions or rashes  NEURO: Normal strength and tone, mentation intact and speech normal  PSYCH: mentation appears normal, affect normal/bright        Signed Electronically by: To Whitt MD

## 2024-04-29 NOTE — RESULT ENCOUNTER NOTE
Suhail,  Your creatinine remains mildly elevated, similar to the past, and the rest of your lab work looks fine and similar to previous values as well including your lipid values, electrolytes, and blood counts.  Please continue your same medication.  I would advise another recheck in 1 year.    To Whitt MD

## 2024-08-03 NOTE — NURSING NOTE
"Chief Complaint   Patient presents with     RECHECK     6 month follow up      .Blood pressure 119/61, pulse 67, temperature 97.2  F (36.2  C), height 1.803 m (5' 11\"), weight 99.3 kg (219 lb), SpO2 100 %.      Magdi Timmons LPN    " No (0)

## 2024-11-25 NOTE — PROGRESS NOTES
[facility] EAR NOSE AND THROAT CLINIC [address] [address]  Phone: [phone]  Fax: [phone]    Patient:  Suhail Mustafa, Date of birth 1970  Date of Visit:  12/04/2024  Referring Provider Referred Self      Assessment & Plan      Suhail Mustafa is a 54 year old male presenting for follow-up. Left ear was cleaned with improvement in his plugging sensation. He will return in 6 months for routine ear cleaning with one of our APPs and use drops as needed and before his visit.     HPI  Drew J Ramsey is a 54 year old male presenting for follow-up. He has a history of sensorineural hearing loss and left eustachian tube dysfunction. He is s/p left laser myringotomy and t-tube placement on 5/8/23. He was previously seen on 02/08/2024, where he reported having several URIs over the Winter season, but his T-tube was open and was providing aeration.      He has been having intermittent plugging or drainage from his tube which improves with use of drops. He feels a little plugged today. No pain or changes in hearing.     /79   Pulse 70   Temp 97.8  F (36.6  C)   Wt 102.1 kg (225 lb)   SpO2 96%   BMI 31.57 kg/m     Physical examination:  male in no acute distress.  Alert and answering questions appropriately.  HB 1/6 bilaterally.  Bilateral ears examined under the microscope.   Right EAC clear, TM intact, middle ear aerated.   Left ear canal clear, slight soft crusting at the tip of the t-tube which was removed with a little squamous epithelium between the base and the TM which was suctioned free, lumen open, middle ear clear.     Provider Disclosure:  I agree with above History, Review of Systems, Physical exam and Plan.  I have reviewed the content of the documentation and have edited it as needed. I have personally performed the services documented here and the documentation accurately represents those services and the decisions I have  made.      Electronically signed by:  Sayda Olson MD  Fellow Physician  Otology & Neurotology  [facility]      Please note the note was pre charted by the scribe and the patient was seen by the fellow alone.

## 2024-12-02 ENCOUNTER — OFFICE VISIT (OUTPATIENT)
Dept: AUDIOLOGY | Facility: CLINIC | Age: 54
End: 2024-12-02
Payer: COMMERCIAL

## 2024-12-02 DIAGNOSIS — H90.A32 MIXED CONDUCTIVE AND SENSORINEURAL HEARING LOSS OF LEFT EAR WITH RESTRICTED HEARING OF RIGHT EAR: Primary | ICD-10-CM

## 2024-12-02 DIAGNOSIS — H90.A21 SENSORINEURAL HEARING LOSS (SNHL) OF RIGHT EAR WITH RESTRICTED HEARING OF LEFT EAR: ICD-10-CM

## 2024-12-02 PROCEDURE — V5299 HEARING SERVICE: HCPCS | Performed by: AUDIOLOGIST

## 2024-12-02 NOTE — PROGRESS NOTES
AUDIOLOGY REPORT: HEARING AID RECHECK    SUBJECTIVE: Suhail Mustafa is a 54 year old male, :  1970, was seen in the Audiology Clinic at [facility] on 24 for a return check of their hearing aids. Patient was unaccompanied to today's visit.       Background:   Patient is here today with the complaint of left ear hearing aid not working.     Procedures:       SIDE: Left    : Phonak    TYPE: [employer] L50-R NELDA    S/N: [id number]     WARRANTY: 2026    General cleaning of the hearing aids and left earmold was performed. Replaced waxguard and large open dome of  the right ear hearing aid. A new size 2 MP  was required to return the left ear hearing aid to function. Biologic listening check found the hearing aids sounding crisp and clear.     Plan:   Patient will return as needed for hearing aid concerns.     NO CHARGE VISIT      David Cartagena CCC-A  Licensed Audiologist #[id number]  2024

## 2024-12-04 ENCOUNTER — OFFICE VISIT (OUTPATIENT)
Dept: OTOLARYNGOLOGY | Facility: CLINIC | Age: 54
End: 2024-12-04
Payer: COMMERCIAL

## 2024-12-04 VITALS
SYSTOLIC BLOOD PRESSURE: 122 MMHG | DIASTOLIC BLOOD PRESSURE: 79 MMHG | WEIGHT: 225 LBS | TEMPERATURE: 97.8 F | BODY MASS INDEX: 31.57 KG/M2 | OXYGEN SATURATION: 96 % | HEART RATE: 70 BPM

## 2024-12-04 DIAGNOSIS — H69.92 DYSFUNCTION OF LEFT EUSTACHIAN TUBE: Primary | ICD-10-CM

## 2024-12-04 DIAGNOSIS — H90.72 MIXED CONDUCTIVE AND SENSORINEURAL HEARING LOSS OF LEFT EAR WITH UNRESTRICTED HEARING OF RIGHT EAR: ICD-10-CM

## 2024-12-04 PROCEDURE — 99207 PR NO CHARGE LOS: CPT | Mod: GC | Performed by: OTOLARYNGOLOGY

## 2024-12-04 ASSESSMENT — PAIN SCALES - GENERAL: PAINLEVEL_OUTOF10: NO PAIN (0)

## 2024-12-04 NOTE — NURSING NOTE
Chief Complaint   Patient presents with    RECHECK     Follow up     Blood pressure 122/79, pulse 70, temperature 97.8  F (36.6  C), weight 102.1 kg (225 lb), SpO2 96%.  Magdi Timmons LPN

## 2024-12-04 NOTE — Clinical Note
12/4/2024       RE: Suhail Mustafa  [address] [place] [address]     Dear Colleague,    Thank you for referring your patient, Suhail Mustafa, to the [facility] EAR NOSE AND THROAT CLINIC [place] at [facility]. Please see a copy of my visit note below.      [facility] EAR NOSE AND THROAT CLINIC [address]  4TH FLOOR  [facility] [address]  Phone: [phone]  Fax: [phone]    Patient:  Suhail Mustafa, Date of birth 1970  Date of Visit:  12/04/2024  Referring Provider Referred Self      Assessment & Plan     Suhail Mustafa is a 54 year old male presenting for follow-up. Left ear was cleaned with improvement in his plugging sensation. He will return in 6 months for routine ear cleaning and use drops as needed and before his visit.     HPI  Drew J Ramsey is a 54 year old male presenting for follow-up. He has a history of sensorineural hearing loss and left eustachian tube dysfunction. He is s/p left laser myringotomy and t-tube placement on 5/8/23. He was previously seen on 02/08/2024, where he reported having several URIs over the Winter season, but his T-tube was open and was providing aeration.  He has been having intermittent plugging or drainage from his tube which improves with use of drops. He feels a little plugged today. No pain or changes in hearing.     /79   Pulse 70   Temp 97.8  F (36.6  C)   Wt 102.1 kg (225 lb)   SpO2 96%   BMI 31.57 kg/m     Physical examination:  male in no acute distress.  Alert and answering questions appropriately.  HB 1/6 bilaterally.  Bilateral ears examined under the microscope. Right EAC clear, TM intact, middle ear aerated. Left ear canal clear, slight soft crusting at the tip of the t-tube which was removed with a little squamous epithelium between the base and the TM which was suctioned free, lumen open, middle ear clear.     Provider  Disclosure:  I agree with above History, Review of Systems, Physical exam and Plan.  I have reviewed the content of the documentation and have edited it as needed. I have personally performed the services documented here and the documentation accurately represents those services and the decisions I have made.      Electronically signed by:  Sayda Olson MD  Fellow Physician  Otology & Neurotology  [facility]        Again, thank you for allowing me to participate in the care of your patient.      Sincerely,    Yaneth Rogel MD

## 2024-12-04 NOTE — PATIENT INSTRUCTIONS
You were seen in the ENT Clinic today by Dr. Rogel. If you have any questions or concerns after your appointment, please contact us (see below)        Please return to clinic in 6 months to see Merlene or Erna    How to Contact Us:  Send a [employer] message to your provider. Our team will respond to you via [employer]. Occasionally, we will need to call you to get further information.  For urgent matters (Monday-Friday), call the ENT Clinic: [phone] and speak with a call center team member - they will route your call appropriately.   If you'd like to speak directly with a nurse, please find our contact information below. We do our best to check voicemail frequently throughout the day, and will work to call you back within 1-2 days. For urgent matters, please use the general clinic phone numbers listed above.    Teri PRASAD, RN, BSN  RN Care Coordinator, ENT Clinic  [facility]  Direct: [phone]  Bettie NICOLE LPN  Direct: [phone]

## 2025-01-27 ENCOUNTER — OFFICE VISIT (OUTPATIENT)
Dept: AUDIOLOGY | Facility: CLINIC | Age: 55
End: 2025-01-27
Payer: COMMERCIAL

## 2025-01-27 ENCOUNTER — TELEPHONE (OUTPATIENT)
Dept: AUDIOLOGY | Facility: CLINIC | Age: 55
End: 2025-01-27

## 2025-01-27 DIAGNOSIS — H90.A21 SENSORINEURAL HEARING LOSS (SNHL) OF RIGHT EAR WITH RESTRICTED HEARING OF LEFT EAR: Primary | ICD-10-CM

## 2025-01-27 DIAGNOSIS — H90.A32 MIXED CONDUCTIVE AND SENSORINEURAL HEARING LOSS OF LEFT EAR WITH RESTRICTED HEARING OF RIGHT EAR: ICD-10-CM

## 2025-01-27 PROCEDURE — V5299 HEARING SERVICE: HCPCS | Performed by: AUDIOLOGIST

## 2025-01-27 NOTE — PROGRESS NOTES
AUDIOLOGY REPORT: HEARING AID RECHECK    SUBJECTIVE: Suhail Mustafa is a 54 year old male, :  1970, was seen in the Audiology Clinic at [facility] on 25 for a return check of their hearing aids. The patient was unaccompanied.      Background:   Patient is here today with the complaint of right hearing aid not charging over the past week.    Procedures:       SIDE: Right    : "[employer]eo L50-R    TYPE: RITE    S/N: [id number]     WARRANTY: 2026    Right hearing aid is not charging in his .  The  is fine as the left aid charges in the right port.  The right aid did not begin to charge in the clinic .  Also, right  wire is broken.  Left aid set up to be the bluetooth aid.      Plan:   Aid will be sent to [employer] for warranty repair.  On return, patient to be scheduled for hearing aid check so that both aids can be paired together and the right aid can be selected as the bluetooth aid again.       Osiel Michaels.   Doctor of Audiology  License #[id number]

## 2025-01-27 NOTE — CONFIDENTIAL NOTE
Contacted Suhail and asked that he try placing the right hearing aid into the left side of his  so that we can determine if his  or his hearing aid is faulty.  The aid will be in the left side of the  until his appointment where he will bring both the aid and .     He will be seen at 3:30 today for right hearing aid troubleshooting.    All questions were fully answered.     Osiel Michaels.   Doctor of Audiology  License #[id number]

## 2025-01-29 ENCOUNTER — TELEPHONE (OUTPATIENT)
Dept: FAMILY MEDICINE | Facility: CLINIC | Age: 55
End: 2025-01-29
Payer: COMMERCIAL

## 2025-01-29 NOTE — TELEPHONE ENCOUNTER
Patient calling for general information on influenza.  Influenza (Flu): Care Instructions covered with patient on the phone. Advised to call back with questions.     Judy Fairbanks RN

## 2025-02-04 ENCOUNTER — TELEPHONE (OUTPATIENT)
Dept: AUDIOLOGY | Facility: CLINIC | Age: 55
End: 2025-02-04
Payer: COMMERCIAL

## 2025-02-04 NOTE — TELEPHONE ENCOUNTER
Hearing aid returned from repair.  will reach out to schedule a hearing aid check appointment. Hearing aid will be charged and kept in office     See previous audiology report for more background information    Osiel Dick, [facility]  Licensed Audiologist  [place] #[id number]

## 2025-02-05 ENCOUNTER — OFFICE VISIT (OUTPATIENT)
Dept: AUDIOLOGY | Facility: CLINIC | Age: 55
End: 2025-02-05
Payer: COMMERCIAL

## 2025-02-05 DIAGNOSIS — H90.3 SENSORINEURAL HEARING LOSS, BILATERAL: Primary | ICD-10-CM

## 2025-02-05 PROCEDURE — V5299 HEARING SERVICE: HCPCS | Performed by: AUDIOLOGIST

## 2025-02-05 NOTE — PROGRESS NOTES
AUDIOLOGY REPORT: HEARING AID RECHECK    SUBJECTIVE: Suhail Mustafa is a 54 year old male, :  1970, was seen in the Audiology Clinic at [facility] on 25 to  his repaired right hearing aid, have it paired to the left through [employer] software and also have it paired to his phone again.        Procedures:       SIDE: Right    : [employer]    TYPE: [employer] L50-R    S/N: [id number]     WARRANTY: 2025    Hearing aid settings restored, aid connected to left aid, paired to phone and [employer] Andrea successfully.  He commented that the aid sounds good.  All components were replaced by [employer]    Plan:    Patient will return as needed for hearing aid concerns. It was recommended that he be aware of the battery life in the left aid so that it can be sent for repair prior to end of warranty for batter change if needed.    All questions were fully answered.     Osiel Michaels.   Doctor of Audiology  License #[id number]

## 2025-03-12 ENCOUNTER — MYC REFILL (OUTPATIENT)
Dept: OTOLARYNGOLOGY | Facility: CLINIC | Age: 55
End: 2025-03-12
Payer: COMMERCIAL

## 2025-03-12 DIAGNOSIS — H69.92 EUSTACHIAN TUBE DISORDER, LEFT: ICD-10-CM

## 2025-03-12 RX ORDER — ACETIC ACID 20.65 MG/ML
SOLUTION AURICULAR (OTIC)
Qty: 10 ML | Refills: 11 | Status: SHIPPED | OUTPATIENT
Start: 2025-03-12

## 2025-03-27 ENCOUNTER — PATIENT OUTREACH (OUTPATIENT)
Dept: CARE COORDINATION | Facility: CLINIC | Age: 55
End: 2025-03-27
Payer: COMMERCIAL

## 2025-04-10 NOTE — NURSING NOTE
"Chief Complaint   Patient presents with     RECHECK     follow up      Blood pressure 114/77, pulse 63, temperature 97.6  F (36.4  C), resp. rate 18, height 1.803 m (5' 11\"), weight 97.1 kg (214 lb), SpO2 100 %.    Magdi Timmons LPN    "
(2) more than 100 beats/min

## 2025-04-18 PROBLEM — E66.3 OVERWEIGHT (BMI 25.0-29.9): Status: RESOLVED | Noted: 2018-04-24 | Resolved: 2025-04-18

## 2025-06-04 ENCOUNTER — OFFICE VISIT (OUTPATIENT)
Dept: OTOLARYNGOLOGY | Facility: CLINIC | Age: 55
End: 2025-06-04
Payer: COMMERCIAL

## 2025-06-04 VITALS
TEMPERATURE: 97.6 F | HEART RATE: 64 BPM | SYSTOLIC BLOOD PRESSURE: 142 MMHG | OXYGEN SATURATION: 97 % | WEIGHT: 231 LBS | DIASTOLIC BLOOD PRESSURE: 89 MMHG | BODY MASS INDEX: 32.52 KG/M2

## 2025-06-04 DIAGNOSIS — H69.92 EUSTACHIAN TUBE DISORDER, LEFT: Primary | ICD-10-CM

## 2025-06-04 ASSESSMENT — PAIN SCALES - GENERAL: PAINLEVEL_OUTOF10: MILD PAIN (2)

## 2025-06-04 NOTE — PROGRESS NOTES
Otolaryngology Clinic  June 4, 2025    HPI:  Suhail Mustafa is here for a recheck of their left PE tube. History of left eustachian tube dysfunction with T-tube in place. Last seen in clinic 12/4/24 by Dr. Rogel. Squamous crusting around tube was cleaned. Recommended follow up in 6 months for recheck.     Today, patient returns for recheck. Intermittent left ear drainage that resolves with drops. Last used drops a couple of weeks ago. Today, patient denies any pain or drainage. Hearing is stable.    Otologic microscope exam:    Patient's ear pathology required use of the binocular microscope for the purpose of cleaning and improving visualization in order to assure a more accurate diagnostic evaluation.    Right ear was examined under the microscope.  Normal appearing TM, nicely aerated middle ear space.     Left ear was also examined under the microscope. Ear canal is clean and dry. T-tube in place with patent lumen. There is no crusting around tube.      Assessment and Plan:  1. Eustachian tube disorder, left (Primary)  The patient presents for recheck of left T-tube. T-tube in place. There is no crusting or squamous debris around tube requiring cleaning. Recommend returning again in 6 months for a recheck. If tube remains clean and dry, may extend visits to every 8-12 months.       Patient will follow up in 6 months for recheck.    Merlene Horton DNP, APRN, CNP  Otolaryngology  Head & Neck Surgery  [phone]    10 minutes spent on the date of the encounter doing chart review, history and exam, documentation and further activities per the note

## 2025-06-04 NOTE — NURSING NOTE
Chief Complaint   Patient presents with    RECHECK     6 month follow up     Blood pressure (!) 142/89, pulse 64, temperature 97.6  F (36.4  C), weight 104.8 kg (231 lb), SpO2 97%.  Magdi Timmons LPN

## 2025-06-04 NOTE — LETTER
6/4/2025       RE: Suhail Mustafa  [address]   [place] [address]     Dear Colleague,    Thank you for referring your patient, Suhail Mustafa, to the [facility] [place] at . Please see a copy of my visit note below.      Otolaryngology Clinic  June 4, 2025    HPI:  Suhail Mustafa is here for a recheck of their left PE tube. History of left eustachian tube dysfunction with T-tube in place. Last seen in clinic 12/4/24 by Dr. Rogel. Squamous crusting around tube was cleaned. Recommended follow up in 6 months for recheck.     Today, patient returns for recheck. Intermittent left ear drainage that resolves with drops. Last used drops a couple of weeks ago. Today, patient denies any pain or drainage. Hearing is stable.    Otologic microscope exam:    Patient's ear pathology required use of the binocular microscope for the purpose of cleaning and improving visualization in order to assure a more accurate diagnostic evaluation.    Right ear was examined under the microscope.  Normal appearing TM, nicely aerated middle ear space.     Left ear was also examined under the microscope. Ear canal is clean and dry. T-tube in place with patent lumen. There is no crusting around tube.      Assessment and Plan:  1. Eustachian tube disorder, left (Primary)  The patient presents for recheck of left T-tube. T-tube in place. There is no crusting or squamous debris around tube requiring cleaning. Recommend returning again in 6 months for a recheck. If tube remains clean and dry, may extend visits to every 8-12 months.       Patient will follow up in 6 months for recheck.    Merlene Horton DNP, APRN, CNP  Otolaryngology  Head & Neck Surgery  [phone]    10 minutes spent on the date of the encounter doing chart review, history and exam, documentation and further activities per the note    Again, thank you for allowing me to  participate in the care of your patient.      Sincerely,    Melina oHrton, NP

## 2025-06-05 PROBLEM — E78.00 HIGH CHOLESTEROL: Status: ACTIVE | Noted: 2018-04-24

## 2025-06-05 PROBLEM — E78.5 HYPERLIPIDEMIA LDL GOAL <100: Status: RESOLVED | Noted: 2018-04-24 | Resolved: 2025-06-05

## 2025-08-18 ENCOUNTER — MYC REFILL (OUTPATIENT)
Dept: OTOLARYNGOLOGY | Facility: CLINIC | Age: 55
End: 2025-08-18

## 2025-08-18 DIAGNOSIS — H92.12 OTORRHEA, LEFT: ICD-10-CM

## 2025-08-20 RX ORDER — PREDNISOLONE ACETATE 10 MG/ML
4 SUSPENSION/ DROPS OPHTHALMIC
Qty: 10 ML | Refills: 0 | Status: SHIPPED | OUTPATIENT
Start: 2025-08-21

## (undated) DEVICE — SYR BULB IRRIG 50ML LATEX FREE 0035280

## (undated) DEVICE — PREP DURAPREP 26ML APL 8630

## (undated) DEVICE — BUR ARTHREX COOLCUT EXCALIBUR 4.0MMX13CM AR-8400EX

## (undated) DEVICE — DRAPE STERI U 1015

## (undated) DEVICE — LINEN TOWEL PACK X5 5464

## (undated) DEVICE — BRUSH SURGICAL SCRUB W/4% CHG SOL 25ML 371073

## (undated) DEVICE — NDL 22GA 1.5"

## (undated) DEVICE — PACK SHOULDER ARTHROSCOPY CUSTOM ASC

## (undated) DEVICE — IMM KIT SHOULDER TMAX MASK FACE 7210559

## (undated) DEVICE — SOL HYDROGEN PEROXIDE 3% 4OZ BOTTLE F0010

## (undated) DEVICE — DRAPE EAR CHRONIC LATEX FREE 3609

## (undated) DEVICE — TUBING SYSTEM ARTHREX PATIENT REDEUCE AR-6421

## (undated) DEVICE — PROBE OTO SHORT ANGLED 14320-1

## (undated) DEVICE — SOL NACL 0.9% IRRIG 3000ML BAG 2B7477

## (undated) DEVICE — DRSG STERI STRIP 1/2X4" R1547

## (undated) DEVICE — BLADE KNIFE BEAVER TYMPANOPLASTY 2.5MM W/60D DOWN 377200

## (undated) DEVICE — SUCTION MANIFOLD NEPTUNE 2 SYS 4 PORT 0702-020-000

## (undated) DEVICE — SYR 10ML FINGER CONTROL W/O NDL 309695

## (undated) DEVICE — NDL ECLIPSE 18GA 1.5"

## (undated) DEVICE — Device

## (undated) DEVICE — DRAPE MAYO STAND 23X54 8337

## (undated) DEVICE — ESU PENCIL SMOKE EVAC W/ROCKER SWITCH 0703-047-000

## (undated) DEVICE — GLOVE PROTEXIS POWDER FREE SMT 6.5  2D72PT65X

## (undated) DEVICE — ESU CLEANER TIP 31142717

## (undated) DEVICE — LINEN ORTHO PACK 5446

## (undated) DEVICE — DRSG COTTON BALL 6PK LCB62

## (undated) DEVICE — SOL WATER IRRIG 500ML BOTTLE 2F7113

## (undated) DEVICE — GLOVE BIOGEL PI SZ 7.5 40875

## (undated) DEVICE — ARTHROSCOPIC CANNULA TWIST-IN PURPLE 7MMX7CM AR-6570

## (undated) DEVICE — IMM KIT SHOULDER STABILIZATION 7210573

## (undated) DEVICE — TUBING SUCTION MEDI-VAC 1/4"X20' N620A

## (undated) DEVICE — SPONGE SURGIFOAM 12 1972

## (undated) DEVICE — TUBE EAR PAPARELLA 2000 TYPE 2 70140256

## (undated) DEVICE — STRAP STIRRUP W/SLIP 30187-030

## (undated) DEVICE — GLOVE PROTEXIS POWDER FREE SMT 7.0  2D72PT70X

## (undated) DEVICE — SU NDL MCGOWAN 1/2 1859-6D

## (undated) DEVICE — TUBING SYSTEM ARTHREX PUMP REDEUCE AR-6411

## (undated) DEVICE — BLADE KNIFE BEAVER MYRINGOTOMY 377100

## (undated) DEVICE — SOL NACL 0.9% IRRIG 500ML BOTTLE 2F7123

## (undated) DEVICE — ESU GROUND PAD ADULT W/CORD E7507

## (undated) DEVICE — SOL WATER IRRIG 1000ML BOTTLE 2F7114

## (undated) DEVICE — TUBING SET ARTHREX DUALWAVE OUTFLOW AR-6430

## (undated) DEVICE — SU LOOP #2 FIBERLOOP  AR-7234

## (undated) RX ORDER — CEFAZOLIN SODIUM 1 G/3ML
INJECTION, POWDER, FOR SOLUTION INTRAMUSCULAR; INTRAVENOUS
Status: DISPENSED
Start: 2018-11-16

## (undated) RX ORDER — HYDROMORPHONE HYDROCHLORIDE 1 MG/ML
INJECTION, SOLUTION INTRAMUSCULAR; INTRAVENOUS; SUBCUTANEOUS
Status: DISPENSED
Start: 2018-11-16

## (undated) RX ORDER — ONDANSETRON 2 MG/ML
INJECTION INTRAMUSCULAR; INTRAVENOUS
Status: DISPENSED
Start: 2018-11-16

## (undated) RX ORDER — ACETAMINOPHEN 325 MG/1
TABLET ORAL
Status: DISPENSED
Start: 2019-06-12

## (undated) RX ORDER — ONDANSETRON 2 MG/ML
INJECTION INTRAMUSCULAR; INTRAVENOUS
Status: DISPENSED
Start: 2019-06-12

## (undated) RX ORDER — FENTANYL CITRATE 50 UG/ML
INJECTION, SOLUTION INTRAMUSCULAR; INTRAVENOUS
Status: DISPENSED
Start: 2023-05-08

## (undated) RX ORDER — PROPOFOL 10 MG/ML
INJECTION, EMULSION INTRAVENOUS
Status: DISPENSED
Start: 2019-06-12

## (undated) RX ORDER — GABAPENTIN 300 MG/1
CAPSULE ORAL
Status: DISPENSED
Start: 2019-06-12

## (undated) RX ORDER — OFLOXACIN 3 MG/ML
SOLUTION/ DROPS OPHTHALMIC
Status: DISPENSED
Start: 2023-05-08

## (undated) RX ORDER — GABAPENTIN 300 MG/1
CAPSULE ORAL
Status: DISPENSED
Start: 2023-05-08

## (undated) RX ORDER — ACETAMINOPHEN 325 MG/1
TABLET ORAL
Status: DISPENSED
Start: 2023-05-08

## (undated) RX ORDER — EPHEDRINE SULFATE 50 MG/ML
INJECTION, SOLUTION INTRAMUSCULAR; INTRAVENOUS; SUBCUTANEOUS
Status: DISPENSED
Start: 2019-06-12

## (undated) RX ORDER — PROPOFOL 10 MG/ML
INJECTION, EMULSION INTRAVENOUS
Status: DISPENSED
Start: 2018-11-16

## (undated) RX ORDER — DEXAMETHASONE SODIUM PHOSPHATE 4 MG/ML
INJECTION, SOLUTION INTRA-ARTICULAR; INTRALESIONAL; INTRAMUSCULAR; INTRAVENOUS; SOFT TISSUE
Status: DISPENSED
Start: 2018-11-16

## (undated) RX ORDER — OFLOXACIN 3 MG/ML
SOLUTION/ DROPS OPHTHALMIC
Status: DISPENSED
Start: 2019-06-12

## (undated) RX ORDER — FENTANYL CITRATE 50 UG/ML
INJECTION, SOLUTION INTRAMUSCULAR; INTRAVENOUS
Status: DISPENSED
Start: 2018-11-16

## (undated) RX ORDER — GABAPENTIN 300 MG/1
CAPSULE ORAL
Status: DISPENSED
Start: 2018-11-16

## (undated) RX ORDER — LIDOCAINE HYDROCHLORIDE 10 MG/ML
INJECTION, SOLUTION EPIDURAL; INFILTRATION; INTRACAUDAL; PERINEURAL
Status: DISPENSED
Start: 2018-08-06

## (undated) RX ORDER — LIDOCAINE HYDROCHLORIDE 20 MG/ML
INJECTION, SOLUTION EPIDURAL; INFILTRATION; INTRACAUDAL; PERINEURAL
Status: DISPENSED
Start: 2018-11-16

## (undated) RX ORDER — DEXAMETHASONE SODIUM PHOSPHATE 4 MG/ML
INJECTION, SOLUTION INTRA-ARTICULAR; INTRALESIONAL; INTRAMUSCULAR; INTRAVENOUS; SOFT TISSUE
Status: DISPENSED
Start: 2019-06-12

## (undated) RX ORDER — FENTANYL CITRATE 50 UG/ML
INJECTION, SOLUTION INTRAMUSCULAR; INTRAVENOUS
Status: DISPENSED
Start: 2019-06-12

## (undated) RX ORDER — ACETAMINOPHEN 325 MG/1
TABLET ORAL
Status: DISPENSED
Start: 2018-11-16

## (undated) RX ORDER — LIDOCAINE HYDROCHLORIDE 20 MG/ML
INJECTION, SOLUTION EPIDURAL; INFILTRATION; INTRACAUDAL; PERINEURAL
Status: DISPENSED
Start: 2019-06-12

## (undated) RX ORDER — BUPIVACAINE HYDROCHLORIDE 2.5 MG/ML
INJECTION, SOLUTION INFILTRATION; PERINEURAL
Status: DISPENSED
Start: 2018-11-16